# Patient Record
Sex: FEMALE | Race: WHITE | Employment: OTHER | URBAN - NONMETROPOLITAN AREA
[De-identification: names, ages, dates, MRNs, and addresses within clinical notes are randomized per-mention and may not be internally consistent; named-entity substitution may affect disease eponyms.]

---

## 2019-03-26 LAB
BILIRUBIN URINE: ABNORMAL MG/DL
BLOOD, URINE: POSITIVE
CLARITY: ABNORMAL
COLOR: YELLOW
GLUCOSE URINE: NEGATIVE
KETONES, URINE: ABNORMAL
LEUKOCYTE ESTERASE, URINE: ABNORMAL
NITRITE, URINE: NEGATIVE
PH UA: 6 (ref 4.5–8)
PROTEIN UA: ABNORMAL
SPECIFIC GRAVITY UA: 1.02 (ref 1–1.03)
UROBILINOGEN, URINE: NORMAL

## 2019-03-28 LAB
BASOPHILS ABSOLUTE: NORMAL /ΜL
BASOPHILS RELATIVE PERCENT: NORMAL %
BUN BLDV-MCNC: 22 MG/DL
CALCIUM SERPL-MCNC: 9 MG/DL
CHLORIDE BLD-SCNC: 103 MMOL/L
CO2: 29 MMOL/L
CREAT SERPL-MCNC: 0.94 MG/DL
EOSINOPHILS ABSOLUTE: NORMAL /ΜL
EOSINOPHILS RELATIVE PERCENT: NORMAL %
GFR CALCULATED: NORMAL
GLUCOSE BLD-MCNC: 132 MG/DL
HCT VFR BLD CALC: 41.5 % (ref 36–46)
HEMOGLOBIN: 13.4 G/DL (ref 12–16)
LYMPHOCYTES ABSOLUTE: NORMAL /ΜL
LYMPHOCYTES RELATIVE PERCENT: NORMAL %
MCH RBC QN AUTO: 32 PG
MCHC RBC AUTO-ENTMCNC: 32.3 G/DL
MCV RBC AUTO: 99 FL
MONOCYTES ABSOLUTE: NORMAL /ΜL
MONOCYTES RELATIVE PERCENT: NORMAL %
NEUTROPHILS ABSOLUTE: NORMAL /ΜL
NEUTROPHILS RELATIVE PERCENT: NORMAL %
PDW BLD-RTO: 12.7 %
PLATELET # BLD: 140 K/ΜL
PMV BLD AUTO: 9.7 FL
POTASSIUM SERPL-SCNC: 3.7 MMOL/L
RBC # BLD: 4.19 10^6/ΜL
SODIUM BLD-SCNC: 139 MMOL/L
WBC # BLD: 9.5 10^3/ML

## 2019-04-10 ENCOUNTER — OFFICE VISIT (OUTPATIENT)
Dept: UROLOGY | Age: 74
End: 2019-04-10
Payer: MEDICARE

## 2019-04-10 VITALS
HEIGHT: 63 IN | DIASTOLIC BLOOD PRESSURE: 78 MMHG | WEIGHT: 218 LBS | BODY MASS INDEX: 38.62 KG/M2 | SYSTOLIC BLOOD PRESSURE: 120 MMHG

## 2019-04-10 DIAGNOSIS — R31.0 GROSS HEMATURIA: Primary | ICD-10-CM

## 2019-04-10 LAB
BILIRUBIN, POC: NORMAL
BLOOD URINE, POC: NORMAL
CLARITY, POC: CLEAR
COLOR, POC: YELLOW
GLUCOSE URINE, POC: NORMAL
KETONES, POC: NORMAL
LEUKOCYTE EST, POC: NORMAL
NITRITE, POC: NORMAL
PH, POC: 5.5
POST VOID RESIDUAL (PVR): 4 ML
PROTEIN, POC: NORMAL
SPECIFIC GRAVITY, POC: 1.01
UROBILINOGEN, POC: 0.2

## 2019-04-10 PROCEDURE — 1090F PRES/ABSN URINE INCON ASSESS: CPT | Performed by: NURSE PRACTITIONER

## 2019-04-10 PROCEDURE — 1123F ACP DISCUSS/DSCN MKR DOCD: CPT | Performed by: NURSE PRACTITIONER

## 2019-04-10 PROCEDURE — 81003 URINALYSIS AUTO W/O SCOPE: CPT | Performed by: NURSE PRACTITIONER

## 2019-04-10 PROCEDURE — 4040F PNEUMOC VAC/ADMIN/RCVD: CPT | Performed by: NURSE PRACTITIONER

## 2019-04-10 PROCEDURE — 1036F TOBACCO NON-USER: CPT | Performed by: NURSE PRACTITIONER

## 2019-04-10 PROCEDURE — G8417 CALC BMI ABV UP PARAM F/U: HCPCS | Performed by: NURSE PRACTITIONER

## 2019-04-10 PROCEDURE — 99204 OFFICE O/P NEW MOD 45 MIN: CPT | Performed by: NURSE PRACTITIONER

## 2019-04-10 PROCEDURE — G8400 PT W/DXA NO RESULTS DOC: HCPCS | Performed by: NURSE PRACTITIONER

## 2019-04-10 PROCEDURE — G8427 DOCREV CUR MEDS BY ELIG CLIN: HCPCS | Performed by: NURSE PRACTITIONER

## 2019-04-10 PROCEDURE — 3017F COLORECTAL CA SCREEN DOC REV: CPT | Performed by: NURSE PRACTITIONER

## 2019-04-10 PROCEDURE — 51798 US URINE CAPACITY MEASURE: CPT | Performed by: NURSE PRACTITIONER

## 2019-04-10 RX ORDER — CLOPIDOGREL BISULFATE 75 MG/1
75 TABLET ORAL DAILY
COMMUNITY
End: 2020-07-30 | Stop reason: SDUPTHER

## 2019-04-10 RX ORDER — LORAZEPAM 0.5 MG/1
0.5 TABLET ORAL EVERY 6 HOURS PRN
Status: ON HOLD | COMMUNITY
End: 2020-08-13 | Stop reason: ALTCHOICE

## 2019-04-10 RX ORDER — SIMVASTATIN 10 MG
10 TABLET ORAL NIGHTLY
COMMUNITY
End: 2021-09-13 | Stop reason: SDUPTHER

## 2019-04-10 RX ORDER — RANITIDINE 150 MG/1
150 CAPSULE ORAL 2 TIMES DAILY
Status: ON HOLD | COMMUNITY
End: 2020-08-13 | Stop reason: ALTCHOICE

## 2019-04-10 RX ORDER — BUDESONIDE AND FORMOTEROL FUMARATE DIHYDRATE 160; 4.5 UG/1; UG/1
2 AEROSOL RESPIRATORY (INHALATION) 2 TIMES DAILY
Status: ON HOLD | COMMUNITY
End: 2020-08-13 | Stop reason: ALTCHOICE

## 2019-04-10 RX ORDER — FUROSEMIDE 20 MG/1
20 TABLET ORAL 2 TIMES DAILY
Status: ON HOLD | COMMUNITY
End: 2020-08-13 | Stop reason: SDUPTHER

## 2019-04-10 RX ORDER — DILTIAZEM HYDROCHLORIDE 60 MG/1
60 TABLET, FILM COATED ORAL 2 TIMES DAILY
COMMUNITY
End: 2020-11-17 | Stop reason: SDUPTHER

## 2019-04-10 RX ORDER — POTASSIUM CHLORIDE 750 MG/1
10 TABLET, FILM COATED, EXTENDED RELEASE ORAL 2 TIMES DAILY
COMMUNITY
End: 2021-03-04 | Stop reason: SDUPTHER

## 2019-04-10 RX ORDER — ISOSORBIDE MONONITRATE 30 MG/1
30 TABLET, EXTENDED RELEASE ORAL 2 TIMES DAILY
COMMUNITY
End: 2020-11-19

## 2019-04-10 ASSESSMENT — ENCOUNTER SYMPTOMS
ABDOMINAL PAIN: 0
RESPIRATORY NEGATIVE: 1
ALLERGIC/IMMUNOLOGIC NEGATIVE: 1
BACK PAIN: 0
EYES NEGATIVE: 1
GASTROINTESTINAL NEGATIVE: 1

## 2019-04-10 NOTE — PROGRESS NOTES
[address] [facility]  [address] [facility] [place] [address]  Dept: [phone]  Loc: [phone]    Visit Date: 4/10/2019        HPI:     Frances Horton is a 68 y.o. female who presents today for:  Chief Complaint   Patient presents with    Advice Only     New patient referred by Dakotah Oswald MD for recurrent UTI'S and hematuria.  Hematuria       HPI   Pt referred to our office for gross hematuria. She reports she recently presented to the ER secondary to chest pain. While she was in the ER she started having hematuria and was noted to have a UTI. She was admitted and treated with IV antibiotics. She reports last UTI was 8-9 years ago and denies issues with recurrent UTIs. She notes increased fatigue, decreased appetite and intermittent blood in the urine. She has a hx of kidney stones and reports it has been several years since her last episode. She denies dysuria, fever, chills, frequency, urgency, abdominal or flank pain. Current Outpatient Medications   Medication Sig Dispense Refill    clopidogrel (PLAVIX) 75 MG tablet Take 75 mg by mouth daily      diltiazem (CARDIZEM) 60 MG tablet Take 60 mg by mouth 4 times daily      furosemide (LASIX) 20 MG tablet Take 20 mg by mouth 2 times daily      isosorbide mononitrate (IMDUR) 30 MG extended release tablet Take 30 mg by mouth daily      potassium chloride (KLOR-CON 10) 10 MEQ extended release tablet Take 10 mEq by mouth daily      LORazepam (ATIVAN) 0.5 MG tablet Take 0.5 mg by mouth every 6 hours as needed for Anxiety.       metoprolol tartrate (LOPRESSOR) 25 MG tablet Take 25 mg by mouth 2 times daily      ranitidine (ZANTAC) 150 MG capsule Take 150 mg by mouth 2 times daily      simvastatin (ZOCOR) 10 MG tablet Take 10 mg by mouth nightly      budesonide-formoterol (SYMBICORT) 160-4.5 MCG/ACT AERO Inhale 2 puffs into the lungs 2 times daily       No current facility-administered medications for this visit. Past Medical History  Lyndsey Thapa  has no past medical history on file. Past Surgical History  The patient  has a past surgical history that includes Hysterectomy, vaginal; Cholecystectomy; Ovary removal; Kidney stone surgery; and Carotid stent placement. Family History  This patient's family history is not on file. Social History  Keyanna  reports that she has never smoked. She has never used smokeless tobacco.      Subjective:      Review of Systems   Constitutional: Positive for appetite change and fatigue. Negative for activity change, chills, diaphoresis and fever. HENT: Negative. Eyes: Negative. Respiratory: Negative. Cardiovascular: Negative. Gastrointestinal: Negative. Negative for abdominal pain. Endocrine: Negative. Genitourinary: Positive for hematuria. Negative for decreased urine volume, difficulty urinating, dysuria, flank pain, frequency and urgency. Musculoskeletal: Negative. Negative for back pain. Skin: Negative. Allergic/Immunologic: Negative. Neurological: Negative. Hematological: Negative. Psychiatric/Behavioral: Negative. Objective:   /78   Ht 5' 3\" (1.6 m)   Wt 218 lb (98.9 kg)   BMI 38.62 kg/m²     Physical Exam   Constitutional: She is oriented to person, place, and time. She appears well-developed and well-nourished. No distress. HENT:   Head: Normocephalic and atraumatic. Eyes: Right eye exhibits no discharge. Left eye exhibits no discharge. No scleral icterus. Neck: No JVD present. No tracheal deviation present. Cardiovascular: Normal rate, regular rhythm, normal heart sounds and intact distal pulses. No murmur heard. Pulmonary/Chest: Effort normal and breath sounds normal. No respiratory distress. Abdominal: Soft. Bowel sounds are normal. She exhibits no distension. There is no tenderness. Musculoskeletal: Normal range of motion.    Neurological: She is alert and oriented to person, place, and time. No cranial nerve deficit. Skin: Skin is warm and dry. Capillary refill takes less than 2 seconds. She is not diaphoretic. Psychiatric: She has a normal mood and affect. Her behavior is normal.       POC  Results for POC orders placed in visit on 04/10/19   POCT Urinalysis No Micro (Auto)   Result Value Ref Range    Color, UA yellow     Clarity, UA clear     Glucose, UA POC neg     Bilirubin, UA neg     Ketones, UA neg     Spec Grav, UA 1.015     Blood, UA POC neg     pH, UA 5.5     Protein, UA POC neg     Urobilinogen, UA 0.2     Leukocytes, UA moderate     Nitrite, UA neg    poct post void residual   Result Value Ref Range    post void residual 4 ml         Assessment:     Gross hematuria  Hx kidney stones  Recent UTI      Plan:     Pt with recent UTI and intermittent gross hematuria. She notes hx of stones. Recommend checking CT urogram to eval for stones. If CT negative for abnormality we will schedule office cystoscopy. CT urogram--call results.

## 2019-04-15 ENCOUNTER — TELEPHONE (OUTPATIENT)
Dept: UROLOGY | Age: 74
End: 2019-04-15

## 2019-04-16 ENCOUNTER — HOSPITAL ENCOUNTER (OUTPATIENT)
Dept: CT IMAGING | Age: 74
Discharge: HOME | End: 2019-04-16
Payer: MEDICARE

## 2019-04-16 ENCOUNTER — TELEPHONE (OUTPATIENT)
Dept: UROLOGY | Age: 74
End: 2019-04-16

## 2019-04-16 DIAGNOSIS — Z00.6 ENCOUNTER FOR EXAMINATION FOR NORMAL COMPARISON AND CONTROL IN CLINICAL RESEARCH PROGRAM: ICD-10-CM

## 2019-04-16 PROCEDURE — 3209999900 CT COMPARISON OF OUTSIDE FILMS

## 2019-04-16 NOTE — TELEPHONE ENCOUNTER
Received CT from [facility] [place] GUI, Scanned in [place] and routed to [place]

## 2019-04-16 NOTE — TELEPHONE ENCOUNTER
Please have Maite Keep push through [facility] images of patient's CT abd/pelvis performed 4/12/19 so I can review as report notes a distal ureteral calculus.

## 2019-04-17 ENCOUNTER — TELEPHONE (OUTPATIENT)
Dept: UROLOGY | Age: 74
End: 2019-04-17

## 2019-04-17 NOTE — TELEPHONE ENCOUNTER
Pt's CT scan with 6 x 6 mm left distal ureteral stone with hydronephrosis. Called and discussed with patient. Recommend ureteroscopic treatment and pt is agreeable. I described the procedure in detail and also described the associated risks and benefits at length. We discussed possible alternative therapies. We discussed the risks and benefits of not undergoing therapy. Patient understands these risks and benefits and desires to proceed. Post-op expectations were discussed; stent pain, urinary frequency and urgency secondary to the stent, dysuria which should improve 1-2 days after procedure, and intermittent hematuria can be expected as long as stent is in place. Please schedule pt for Cystoscopy, possible left ureteroscopy, possible laser lithotripsy, possible basket retrieval of stone fragments, possible left ureteral stent placement by Dr. Jory Alcaraz with appropriate medical clearance.

## 2019-04-18 ENCOUNTER — TELEPHONE (OUTPATIENT)
Dept: UROLOGY | Age: 74
End: 2019-04-18

## 2019-04-18 DIAGNOSIS — Z01.818 PRE-OP TESTING: ICD-10-CM

## 2019-04-18 DIAGNOSIS — N20.0 KIDNEY STONE: Primary | ICD-10-CM

## 2019-04-26 NOTE — TELEPHONE ENCOUNTER
DO NOT TAKE ASPIRIN, PLAVIX, FISH OIL, GLUCOSAMINE CHONDROITIN, MULTIVITAMINS, VITAMIN A, VITAMIN E, VITAMIN B, COUMADIN, OR MOTRIN-LIKE DRUGS 7 DAYS PRIOR TO SURGERY AND 3 DAYS FOLLOWING     Michel Leger [birth year] Diagnosis: Kidney Stone    Surgical Physician: Dr. Agata Lemus have been scheduled for the procedure marked below:      Surgery: Cystoscopy, left ureteroscopy,posible laser lithotripsy,possible basket retrieval of stone fragments, and possible left ureteral stent. Date: 5/8/19     Anesthesia: Anesthesiologist (General/Spinal)     Place of Service: [facility]           SURGERY ARRIVAL TIME WILL BE DETERMINED BY DR. Breanna Plasencia AT [address] [place] [address]. YOU WILL BE CONTACTED WITH THIS INFORMATION. INSTRUCTIONS AS MARKED BELOW:    1.  DO NOT eat or drink anything after midnight before surgery. 2.  We prefer you shower or bathe with an antibacterial soap (Dial) the morning of surgery. 3.  Please ensure to have a  with you to transport you home. 4.  Please bring a current medication list, photo ID and insurance card(s) with you  5. Okay to take Tylenol  6. If you take Glucophage or Metformin, hold 48-hours prior to surgery  7. Take blood pressure medication as directed, if taken in the morning take with a small sip of water  8. PLEASE BRING THIS LETTER WITH YOU AND SHOW IT TO THE  AT [place] [address]. 9.  may assist with your surgery  10. Does patient have a Pace Maker? No  11. Please send a copy to the Family Dr: Santhosh Saenz MD   12. Do the uranalysis 7-10 days prior to surgery date. Order included.   13. Your follow up appointment is scheduled for   5/16/19   At  10:30 am With  Dr. Viridiana Perez    Date: 4/26/2019
Patient needs scheduled for surgery. Patient will need clearance from Dr. Sylvia Limon. Cardiac clearance form faxed. Surgery consent on arrival. Pre op testing done. Patient will need uranalysis done. Surgery date pending clearance.
SURGERY 6  [address] [place] [address] [address] JORDAN SANCHEZAJAY GARVIN OFFENEGG II.KAVITA, Ondina Gutierrez [address]      Phone *[phone] *[phone]   Surgical Scheduling Direct Line Phone *[phone] Fax *[phone]      [facility] [birth year] female    [address]   Marital Status:        Home Phone: [phone]      Cell Phone:    Telephone Information:   Mobile [phone]          Surgeon: Dr. Antoine Cornejo  Surgery Date: 5/8/19   Time:     Procedure: Cystoscopy,possible left ureteroscopy,possible laser lithotripsy,possible basket retrieval of stone fragments, and possible left ureteral stent. Diagnosis: Kidney Stone     Important Medical History: In [facility]    Special Inst/Equip:     CPT Codes:    56999  Latex Allergy:  No     Cardiac Device:  No     Anesthesia:  Anesthesiologist (General/Spinal)          Admission Type:  Same Day                             Admit Prior to Day of Surgery: No    Case Location:  Main OR           Preadmission Testing:Phone Call              PAT Date and Time:______________________________________________________    PAT Confirmation #: ______________________________________________________    Post Op Visit: ___________________________________________________________    Need Preop Cardiac Clearance: Yes    Does Patient have Cardiologist/physician?      Dr. Shawnee Butler Confirmation #: __________________________________________________    Seretha Rhodes: ________________________   Date: __________________________     [employer] Name: Medicare
surgery ___Risk Unacceptable-Communication to Follow  Comments:_____________________________________________________  ________________________________________________________________________  Physician ___________________________  Date:_______________  Physician Printed Name:  _________________________    Jack Zapata  [phone]

## 2019-05-02 ENCOUNTER — TELEPHONE (OUTPATIENT)
Dept: UROLOGY | Age: 74
End: 2019-05-02

## 2019-05-02 NOTE — TELEPHONE ENCOUNTER
Patient notified of surgery arrival time. Patient is to be at [address] [place] [address] Same day surgery by  2:00pm   on  05-. Patient reminded to have nothing to eat or drink after midnight. Patient voiced understanding.

## 2019-05-05 NOTE — H&P
History and Physical performed by Hiwot Corado CNP    Diego Campos is a 68 y.o. female who presents today for:       Chief Complaint   Patient presents with    Advice Only       New patient referred by Nolberto Alvarado MD for recurrent UTI'S and hematuria.  Hematuria         HPI   Pt referred to our office for gross hematuria. She reports she recently presented to the ER secondary to chest pain. While she was in the ER she started having hematuria and was noted to have a UTI. She was admitted and treated with IV antibiotics. She reports last UTI was 8-9 years ago and denies issues with recurrent UTIs. She notes increased fatigue, decreased appetite and intermittent blood in the urine. She has a hx of kidney stones and reports it has been several years since her last episode. She denies dysuria, fever, chills, frequency, urgency, abdominal or flank pain.       Current Facility-Administered Medications          Current Outpatient Medications   Medication Sig Dispense Refill    clopidogrel (PLAVIX) 75 MG tablet Take 75 mg by mouth daily        diltiazem (CARDIZEM) 60 MG tablet Take 60 mg by mouth 4 times daily        furosemide (LASIX) 20 MG tablet Take 20 mg by mouth 2 times daily        isosorbide mononitrate (IMDUR) 30 MG extended release tablet Take 30 mg by mouth daily        potassium chloride (KLOR-CON 10) 10 MEQ extended release tablet Take 10 mEq by mouth daily        LORazepam (ATIVAN) 0.5 MG tablet Take 0.5 mg by mouth every 6 hours as needed for Anxiety.        metoprolol tartrate (LOPRESSOR) 25 MG tablet Take 25 mg by mouth 2 times daily        ranitidine (ZANTAC) 150 MG capsule Take 150 mg by mouth 2 times daily        simvastatin (ZOCOR) 10 MG tablet Take 10 mg by mouth nightly        budesonide-formoterol (SYMBICORT) 160-4.5 MCG/ACT AERO Inhale 2 puffs into the lungs 2 times daily          No current facility-administered medications for this visit.       Past Medical History  Christian Nagy  has no past medical history on file.     Past Surgical History  The patient  has a past surgical history that includes Hysterectomy, vaginal; Cholecystectomy; Ovary removal; Kidney stone surgery; and Carotid stent placement.     Family History  This patient's family history is not on file.     Social History  Keyanna  reports that she has never smoked. She has never used smokeless tobacco.        Subjective:      Review of Systems   Constitutional: Positive for appetite change and fatigue. Negative for activity change, chills, diaphoresis and fever. HENT: Negative. Eyes: Negative. Respiratory: Negative. Cardiovascular: Negative. Gastrointestinal: Negative. Negative for abdominal pain. Endocrine: Negative. Genitourinary: Positive for hematuria. Negative for decreased urine volume, difficulty urinating, dysuria, flank pain, frequency and urgency. Musculoskeletal: Negative. Negative for back pain. Skin: Negative. Allergic/Immunologic: Negative. Neurological: Negative. Hematological: Negative. Psychiatric/Behavioral: Negative.          Objective:   /78   Ht 5' 3\" (1.6 m)   Wt 218 lb (98.9 kg)   BMI 38.62 kg/m²      Physical Exam   Constitutional: She is oriented to person, place, and time. She appears well-developed and well-nourished. No distress. HENT:   Head: Normocephalic and atraumatic. Eyes: Right eye exhibits no discharge. Left eye exhibits no discharge. No scleral icterus. Neck: No JVD present. No tracheal deviation present. Cardiovascular: Normal rate, regular rhythm, normal heart sounds and intact distal pulses. No murmur heard. Pulmonary/Chest: Effort normal and breath sounds normal. No respiratory distress. Abdominal: Soft. Bowel sounds are normal. She exhibits no distension. There is no tenderness. Musculoskeletal: Normal range of motion. Neurological: She is alert and oriented to person, place, and time.  No

## 2019-05-08 ENCOUNTER — ANESTHESIA EVENT (OUTPATIENT)
Dept: OPERATING ROOM | Age: 74
End: 2019-05-08
Payer: MEDICARE

## 2019-05-08 ENCOUNTER — HOSPITAL ENCOUNTER (OUTPATIENT)
Age: 74
Setting detail: OUTPATIENT SURGERY
Discharge: HOME | End: 2019-05-08
Attending: UROLOGY | Admitting: UROLOGY
Payer: MEDICARE

## 2019-05-08 ENCOUNTER — ANESTHESIA (OUTPATIENT)
Dept: OPERATING ROOM | Age: 74
End: 2019-05-08
Payer: MEDICARE

## 2019-05-08 VITALS
SYSTOLIC BLOOD PRESSURE: 133 MMHG | TEMPERATURE: 97.7 F | OXYGEN SATURATION: 99 % | RESPIRATION RATE: 6 BRPM | DIASTOLIC BLOOD PRESSURE: 65 MMHG

## 2019-05-08 VITALS
HEART RATE: 83 BPM | DIASTOLIC BLOOD PRESSURE: 66 MMHG | BODY MASS INDEX: 39.23 KG/M2 | WEIGHT: 221.4 LBS | TEMPERATURE: 98.2 F | SYSTOLIC BLOOD PRESSURE: 134 MMHG | HEIGHT: 63 IN | RESPIRATION RATE: 18 BRPM | OXYGEN SATURATION: 92 %

## 2019-05-08 PROCEDURE — 82365 CALCULUS SPECTROSCOPY: CPT

## 2019-05-08 PROCEDURE — 3600000003 HC SURGERY LEVEL 3 BASE: Performed by: UROLOGY

## 2019-05-08 PROCEDURE — 2500000003 HC RX 250 WO HCPCS: Performed by: NURSE ANESTHETIST, CERTIFIED REGISTERED

## 2019-05-08 PROCEDURE — 3600000013 HC SURGERY LEVEL 3 ADDTL 15MIN: Performed by: UROLOGY

## 2019-05-08 PROCEDURE — 7100000010 HC PHASE II RECOVERY - FIRST 15 MIN: Performed by: UROLOGY

## 2019-05-08 PROCEDURE — 6370000000 HC RX 637 (ALT 250 FOR IP): Performed by: NURSE ANESTHETIST, CERTIFIED REGISTERED

## 2019-05-08 PROCEDURE — 2580000003 HC RX 258: Performed by: UROLOGY

## 2019-05-08 PROCEDURE — 52356 CYSTO/URETERO W/LITHOTRIPSY: CPT | Performed by: UROLOGY

## 2019-05-08 PROCEDURE — 2709999900 HC NON-CHARGEABLE SUPPLY: Performed by: UROLOGY

## 2019-05-08 PROCEDURE — 6360000002 HC RX W HCPCS: Performed by: UROLOGY

## 2019-05-08 PROCEDURE — 7100000000 HC PACU RECOVERY - FIRST 15 MIN: Performed by: UROLOGY

## 2019-05-08 PROCEDURE — 7100000011 HC PHASE II RECOVERY - ADDTL 15 MIN: Performed by: UROLOGY

## 2019-05-08 PROCEDURE — 7100000001 HC PACU RECOVERY - ADDTL 15 MIN: Performed by: UROLOGY

## 2019-05-08 PROCEDURE — C1769 GUIDE WIRE: HCPCS | Performed by: UROLOGY

## 2019-05-08 PROCEDURE — C1894 INTRO/SHEATH, NON-LASER: HCPCS | Performed by: UROLOGY

## 2019-05-08 PROCEDURE — C2617 STENT, NON-COR, TEM W/O DEL: HCPCS | Performed by: UROLOGY

## 2019-05-08 PROCEDURE — 52352 CYSTOURETERO W/STONE REMOVE: CPT | Performed by: UROLOGY

## 2019-05-08 PROCEDURE — 3700000000 HC ANESTHESIA ATTENDED CARE: Performed by: UROLOGY

## 2019-05-08 PROCEDURE — 6360000002 HC RX W HCPCS: Performed by: NURSE ANESTHETIST, CERTIFIED REGISTERED

## 2019-05-08 PROCEDURE — 3700000001 HC ADD 15 MINUTES (ANESTHESIA): Performed by: UROLOGY

## 2019-05-08 PROCEDURE — 2720000010 HC SURG SUPPLY STERILE: Performed by: UROLOGY

## 2019-05-08 DEVICE — VARIABLE LENGTH URETERAL STENT
Type: IMPLANTABLE DEVICE | Site: URETER | Status: FUNCTIONAL
Brand: CONTOUR VL™

## 2019-05-08 RX ORDER — FENTANYL CITRATE 50 UG/ML
25 INJECTION, SOLUTION INTRAMUSCULAR; INTRAVENOUS
Status: DISCONTINUED | OUTPATIENT
Start: 2019-05-08 | End: 2019-05-08 | Stop reason: HOSPADM

## 2019-05-08 RX ORDER — LABETALOL HYDROCHLORIDE 5 MG/ML
5 INJECTION, SOLUTION INTRAVENOUS EVERY 10 MIN PRN
Status: DISCONTINUED | OUTPATIENT
Start: 2019-05-08 | End: 2019-05-08 | Stop reason: HOSPADM

## 2019-05-08 RX ORDER — FENTANYL CITRATE 50 UG/ML
50 INJECTION, SOLUTION INTRAMUSCULAR; INTRAVENOUS EVERY 5 MIN PRN
Status: DISCONTINUED | OUTPATIENT
Start: 2019-05-08 | End: 2019-05-08 | Stop reason: HOSPADM

## 2019-05-08 RX ORDER — ONDANSETRON 2 MG/ML
4 INJECTION INTRAMUSCULAR; INTRAVENOUS
Status: DISCONTINUED | OUTPATIENT
Start: 2019-05-08 | End: 2019-05-08 | Stop reason: HOSPADM

## 2019-05-08 RX ORDER — KETOROLAC TROMETHAMINE 10 MG/1
10 TABLET, FILM COATED ORAL EVERY 6 HOURS PRN
Qty: 20 TABLET | Refills: 0 | Status: ON HOLD | OUTPATIENT
Start: 2019-05-08 | End: 2020-08-13 | Stop reason: ALTCHOICE

## 2019-05-08 RX ORDER — PROPOFOL 10 MG/ML
INJECTION, EMULSION INTRAVENOUS PRN
Status: DISCONTINUED | OUTPATIENT
Start: 2019-05-08 | End: 2019-05-08 | Stop reason: SDUPTHER

## 2019-05-08 RX ORDER — SUCCINYLCHOLINE/SOD CL,ISO/PF 200MG/10ML
SYRINGE (ML) INTRAVENOUS PRN
Status: DISCONTINUED | OUTPATIENT
Start: 2019-05-08 | End: 2019-05-08 | Stop reason: SDUPTHER

## 2019-05-08 RX ORDER — MEPERIDINE HYDROCHLORIDE 25 MG/ML
12.5 INJECTION INTRAMUSCULAR; INTRAVENOUS; SUBCUTANEOUS EVERY 5 MIN PRN
Status: DISCONTINUED | OUTPATIENT
Start: 2019-05-08 | End: 2019-05-08 | Stop reason: HOSPADM

## 2019-05-08 RX ORDER — ACETAMINOPHEN 325 MG/1
650 TABLET ORAL EVERY 4 HOURS PRN
Status: DISCONTINUED | OUTPATIENT
Start: 2019-05-08 | End: 2019-05-08 | Stop reason: HOSPADM

## 2019-05-08 RX ORDER — DEXAMETHASONE SODIUM PHOSPHATE 4 MG/ML
INJECTION, SOLUTION INTRA-ARTICULAR; INTRALESIONAL; INTRAMUSCULAR; INTRAVENOUS; SOFT TISSUE PRN
Status: DISCONTINUED | OUTPATIENT
Start: 2019-05-08 | End: 2019-05-08 | Stop reason: SDUPTHER

## 2019-05-08 RX ORDER — FENTANYL CITRATE 50 UG/ML
INJECTION, SOLUTION INTRAMUSCULAR; INTRAVENOUS PRN
Status: DISCONTINUED | OUTPATIENT
Start: 2019-05-08 | End: 2019-05-08 | Stop reason: SDUPTHER

## 2019-05-08 RX ORDER — OXYBUTYNIN CHLORIDE 5 MG/1
5 TABLET ORAL 3 TIMES DAILY PRN
Qty: 30 TABLET | Refills: 0 | Status: ON HOLD | OUTPATIENT
Start: 2019-05-08 | End: 2020-08-13 | Stop reason: ALTCHOICE

## 2019-05-08 RX ORDER — KETOROLAC TROMETHAMINE 30 MG/ML
15 INJECTION, SOLUTION INTRAMUSCULAR; INTRAVENOUS EVERY 6 HOURS PRN
Status: DISCONTINUED | OUTPATIENT
Start: 2019-05-08 | End: 2019-05-08 | Stop reason: HOSPADM

## 2019-05-08 RX ORDER — MINERAL OIL AND WHITE PETROLATUM 150; 830 MG/G; MG/G
OINTMENT OPHTHALMIC PRN
Status: DISCONTINUED | OUTPATIENT
Start: 2019-05-08 | End: 2019-05-08 | Stop reason: SDUPTHER

## 2019-05-08 RX ORDER — SODIUM CHLORIDE 9 MG/ML
INJECTION, SOLUTION INTRAVENOUS CONTINUOUS
Status: DISCONTINUED | OUTPATIENT
Start: 2019-05-08 | End: 2019-05-08 | Stop reason: HOSPADM

## 2019-05-08 RX ORDER — CIPROFLOXACIN 250 MG/1
250 TABLET, FILM COATED ORAL 2 TIMES DAILY
Qty: 6 TABLET | Refills: 0 | Status: SHIPPED | OUTPATIENT
Start: 2019-05-08 | End: 2019-05-11

## 2019-05-08 RX ADMIN — CEFTRIAXONE SODIUM 2 G: 2 INJECTION, POWDER, FOR SOLUTION INTRAMUSCULAR; INTRAVENOUS at 16:43

## 2019-05-08 RX ADMIN — Medication 120 MG: at 16:38

## 2019-05-08 RX ADMIN — FENTANYL CITRATE 50 MCG: 50 INJECTION INTRAMUSCULAR; INTRAVENOUS at 16:38

## 2019-05-08 RX ADMIN — PROPOFOL 150 MG: 10 INJECTION, EMULSION INTRAVENOUS at 16:38

## 2019-05-08 RX ADMIN — MINERAL OIL AND WHITE PETROLATUM 1 APPLICATOR: 150; 830 OINTMENT OPHTHALMIC at 16:40

## 2019-05-08 RX ADMIN — DEXAMETHASONE SODIUM PHOSPHATE 8 MG: 4 INJECTION, SOLUTION INTRAMUSCULAR; INTRAVENOUS at 16:43

## 2019-05-08 RX ADMIN — SODIUM CHLORIDE: 9 INJECTION, SOLUTION INTRAVENOUS at 15:11

## 2019-05-08 ASSESSMENT — PULMONARY FUNCTION TESTS
PIF_VALUE: 24
PIF_VALUE: 24
PIF_VALUE: 27
PIF_VALUE: 24
PIF_VALUE: 2
PIF_VALUE: 24
PIF_VALUE: 24
PIF_VALUE: 27
PIF_VALUE: 23
PIF_VALUE: 27
PIF_VALUE: 24
PIF_VALUE: 24
PIF_VALUE: 30
PIF_VALUE: 25
PIF_VALUE: 2
PIF_VALUE: 2
PIF_VALUE: 20
PIF_VALUE: 24
PIF_VALUE: 25
PIF_VALUE: 24
PIF_VALUE: 24
PIF_VALUE: 25
PIF_VALUE: 24
PIF_VALUE: 3
PIF_VALUE: 27
PIF_VALUE: 24
PIF_VALUE: 3
PIF_VALUE: 24
PIF_VALUE: 2
PIF_VALUE: 21
PIF_VALUE: 26
PIF_VALUE: 26
PIF_VALUE: 24
PIF_VALUE: 1
PIF_VALUE: 26
PIF_VALUE: 24
PIF_VALUE: 25
PIF_VALUE: 24
PIF_VALUE: 24
PIF_VALUE: 3
PIF_VALUE: 23
PIF_VALUE: 24
PIF_VALUE: 24
PIF_VALUE: 30
PIF_VALUE: 24

## 2019-05-08 ASSESSMENT — PAIN SCALES - GENERAL
PAINLEVEL_OUTOF10: 4
PAINLEVEL_OUTOF10: 4
PAINLEVEL_OUTOF10: 0
PAINLEVEL_OUTOF10: 4

## 2019-05-08 ASSESSMENT — PAIN - FUNCTIONAL ASSESSMENT: PAIN_FUNCTIONAL_ASSESSMENT: 0-10

## 2019-05-08 NOTE — PROGRESS NOTES
Admit to sds. Fall and allergy band applied to the patient. Daughter, Kade Pearsons with the patient.

## 2019-05-08 NOTE — ANESTHESIA PRE PROCEDURE
Department of Anesthesiology  Preprocedure Note       Name:  Jorge L Greene   Age:  68 y.o.  :  1945                                          MRN:  [id number]         Date:  2019      Surgeon: Wanad Alcala):  Giorgi Stephenson MD    Procedure: Alean Emory, POSS LEFT URETEROSCOPY, POSS LASER LITOHTRIPSY, POSS BASKET RETRIEVAL OF STONE FRAGMENTS, POSS LEFT URETERAL STENT (N/A Ureter)    Medications prior to admission:   Prior to Admission medications    Medication Sig Start Date End Date Taking? Authorizing Provider   diltiazem (CARDIZEM) 60 MG tablet Take 60 mg by mouth 4 times daily   Yes Historical Provider, MD   furosemide (LASIX) 20 MG tablet Take 20 mg by mouth 2 times daily   Yes Historical Provider, MD   isosorbide mononitrate (IMDUR) 30 MG extended release tablet Take 30 mg by mouth daily   Yes Historical Provider, MD   potassium chloride (KLOR-CON 10) 10 MEQ extended release tablet Take 10 mEq by mouth daily   Yes Historical Provider, MD   metoprolol tartrate (LOPRESSOR) 25 MG tablet Take 25 mg by mouth 2 times daily   Yes Historical Provider, MD   simvastatin (ZOCOR) 10 MG tablet Take 10 mg by mouth nightly   Yes Historical Provider, MD   clopidogrel (PLAVIX) 75 MG tablet Take 75 mg by mouth daily    Historical Provider, MD   LORazepam (ATIVAN) 0.5 MG tablet Take 0.5 mg by mouth every 6 hours as needed for Anxiety.     Historical Provider, MD   ranitidine (ZANTAC) 150 MG capsule Take 150 mg by mouth 2 times daily    Historical Provider, MD   budesonide-formoterol (SYMBICORT) 160-4.5 MCG/ACT AERO Inhale 2 puffs into the lungs 2 times daily    Historical Provider, MD       Current medications:    Current Facility-Administered Medications   Medication Dose Route Frequency Provider Last Rate Last Dose    0.9 % sodium chloride infusion   Intravenous Continuous Giorgi Stephenson  mL/hr at 19 1511      cefTRIAXone (ROCEPHIN) 2 g IVPB in D5W 50ml minibag  2 g Intravenous 30 Min Pre-Op Aidan ALFARO Latricia Perez MD           Allergies: Allergies   Allergen Reactions    Codeine Other (See Comments)     Breathing difficulties       Problem List:  There is no problem list on file for this patient. Past Medical History:  History reviewed. No pertinent past medical history. Past Surgical History:        Procedure Laterality Date    CAROTID STENT PLACEMENT      CHOLECYSTECTOMY      HYSTERECTOMY, VAGINAL      KIDNEY STONE SURGERY      OVARY REMOVAL         Social History:    Social History     Tobacco Use    Smoking status: Never Smoker    Smokeless tobacco: Never Used   Substance Use Topics    Alcohol use: Not Currently                                Counseling given: Not Answered      Vital Signs (Current):   Vitals:    05/08/19 1455   BP: (!) 141/77   Pulse: 66   Resp: 18   Temp: 98.3 °F (36.8 °C)   TempSrc: Temporal   SpO2: 95%   Weight: 221 lb 6.4 oz (100.4 kg)   Height: 5' 3\" (1.6 m)                                              BP Readings from Last 3 Encounters:   05/08/19 (!) 141/77   04/10/19 120/78       NPO Status: Time of last liquid consumption: 2200                        Time of last solid consumption: 2200                        Date of last liquid consumption: 05/07/19                        Date of last solid food consumption: 05/07/19    BMI:   Wt Readings from Last 3 Encounters:   05/08/19 221 lb 6.4 oz (100.4 kg)   04/10/19 218 lb (98.9 kg)     Body mass index is 39.22 kg/m².     CBC:   Lab Results   Component Value Date    WBC 9.5 03/28/2019    RBC 4.19 03/28/2019    HGB 13.4 03/28/2019    HCT 41.5 03/28/2019    MCV 99.0 03/28/2019    RDW 12.7 03/28/2019     03/28/2019       CMP:   Lab Results   Component Value Date     03/28/2019    K 3.7 03/28/2019     03/28/2019    CO2 29 03/28/2019    BUN 22 03/28/2019    CREATININE 0.94 03/28/2019    LABGLOM  03/28/2019      Comment:      eGFR 58    GLUCOSE 132 03/28/2019    CALCIUM 9.0 03/28/2019       POC Tests: No

## 2019-05-08 NOTE — PROGRESS NOTES
Pt has met discharge criteria and states she is ready for discharge to home. IV removed, gauze and tape applied. Dressed in own clothes and personal belongings gathered. Discharge instructions given to pt and family; pt and family verbalized understanding of discharge instructions, prescriptions and follow up appointments. Pt transported to discharge lobby by [place] staff.

## 2019-05-09 ENCOUNTER — TELEPHONE (OUTPATIENT)
Dept: UROLOGY | Age: 74
End: 2019-05-09

## 2019-05-09 NOTE — TELEPHONE ENCOUNTER
The antibiotic is used for prophylaxis. Her prior UA was negative for evidence of infection. No need for mor than 3 days of antibiotic.

## 2019-05-09 NOTE — TELEPHONE ENCOUNTER
The patient was advised of the message from [facility] CNP. The antibiotic is used for prophylaxis. Her prior UA was negative for evidence of infection. No need for mor than 3 days of antibiotic. She voiced understanding.

## 2019-05-10 NOTE — BRIEF OP NOTE
Brief Postoperative Note  ______________________________________________________________    Patient: Flores Yumi  YOB: 1945  MRN: [id number]  Date of Procedure: 5/8/2019    Pre-Op Diagnosis: LARGE DISTAL LEFT URETERAL AND LEFT KIDNEY STONES    Post-Op Diagnosis: Same       Procedure(s):  CYSTO, LEFT URETEROSCOPY, LASER LITHOTRIPSY, BASKET RETRIEVAL OF LEFT URETERAL STONE FRAGMENTS -- ADDED PROCEDURAL SERVICES, LEFT URETERORENOSCOPY, BASKET RETRIEVAL OF RENAL STONE AND LEFT URETERAL STENT    Anesthesia: General    Surgeon(s):  Amy Barakat MD    Assistant: none    Estimated Blood Loss (mL): 5 cc    Complications: none    Specimens:   ID Type Source Tests Collected by Time Destination   A : left ureteral stone fragments Stone (Calculus) Ureter SURGICAL PATHOLOGY, STONE ANALYSIS Amy Barakat MD 5/8/2019 1701        Implants:  Implant Name Type Inv.  Item Serial No.  Lot No. LRB No. Used   STENT URET DBL PIGTL MULTI 6FR [id number] Stent:Urological Donnice Brooking PIGTL MULTI 6FR [id number]  [employer]: [place] [id number] Left 1         Drains: * No LDAs found *    Findings: none    Amy Barakat MD

## 2019-05-12 LAB — STONE ANALYSIS: NORMAL

## 2019-05-16 ENCOUNTER — TELEPHONE (OUTPATIENT)
Dept: UROLOGY | Age: 74
End: 2019-05-16

## 2019-05-16 ENCOUNTER — PROCEDURE VISIT (OUTPATIENT)
Dept: UROLOGY | Age: 74
End: 2019-05-16
Payer: MEDICARE

## 2019-05-16 VITALS
WEIGHT: 216 LBS | HEIGHT: 63 IN | SYSTOLIC BLOOD PRESSURE: 118 MMHG | BODY MASS INDEX: 38.27 KG/M2 | DIASTOLIC BLOOD PRESSURE: 68 MMHG

## 2019-05-16 DIAGNOSIS — N20.0 KIDNEY STONE: Primary | ICD-10-CM

## 2019-05-16 LAB
BILIRUBIN URINE: NEGATIVE
BLOOD URINE, POC: ABNORMAL
CHARACTER, URINE: CLEAR
COLOR, URINE: YELLOW
GLUCOSE URINE: NEGATIVE MG/DL
KETONES, URINE: NEGATIVE
LEUKOCYTE CLUMPS, URINE: ABNORMAL
NITRITE, URINE: NEGATIVE
PH, URINE: 6 (ref 5–9)
PROTEIN, URINE: 100 MG/DL
SPECIFIC GRAVITY, URINE: 1.02 (ref 1–1.03)
UROBILINOGEN, URINE: 0.2 EU/DL (ref 0–1)

## 2019-05-16 PROCEDURE — 52310 CYSTOSCOPY AND TREATMENT: CPT | Performed by: UROLOGY

## 2019-05-16 PROCEDURE — 81003 URINALYSIS AUTO W/O SCOPE: CPT | Performed by: UROLOGY

## 2019-05-16 NOTE — TELEPHONE ENCOUNTER
Ugo Gray is scheduled for a 6 month renal ultrasound at [facility] 11/7/19. Ugo Isaac was advised to arrive well hydrated. Avoid carbonated beverages.     Ugo Gray was advised to bring c-d disc to F/U appointment with Daina Jimenez at  [facility] office on 11/13/19 9:45am.

## 2019-05-16 NOTE — PROGRESS NOTES
Cystoscopy with Stent Removal Note:   The patient was prepared and draped in the usual fashion. The 16 Mosotho flexible cystoscope was introduced under vision into the bladder. The indwelling stent was grasped and withdrawn intact. The patient tolerated the procedure well, there were no complications. 500 mg of Ciprofloxacin PO was given at the time of the procedure. Assessment:      Jak Villafuerte was seen in follow up for stent removal after recent cystoscopy with left ureteroscopy, laser lithotripsy, basket retrieval of ureteral stone fragments -- [facility], left ureterorenoscopy, basket retrieval of left renal stone and placement of left ureteral stent      This was done without difficulty. Plan: We will plan to get a 24 hour urine collection in about 4 weeks. Jak Villafuerte will then be called with the results and recommendations for changes that can be made to limit further stone formation. Return in six months with renal ultrasound. Schedule with ne.

## 2019-07-03 ENCOUNTER — TELEPHONE (OUTPATIENT)
Dept: UROLOGY | Age: 74
End: 2019-07-03

## 2019-07-03 DIAGNOSIS — N20.0 KIDNEY STONES: Primary | ICD-10-CM

## 2020-06-05 ENCOUNTER — OFFICE VISIT (OUTPATIENT)
Dept: CARDIOLOGY CLINIC | Age: 75
End: 2020-06-05
Payer: MEDICARE

## 2020-06-05 VITALS
BODY MASS INDEX: 35.26 KG/M2 | DIASTOLIC BLOOD PRESSURE: 70 MMHG | WEIGHT: 199 LBS | SYSTOLIC BLOOD PRESSURE: 126 MMHG | HEART RATE: 78 BPM | HEIGHT: 63 IN

## 2020-06-05 PROCEDURE — 1123F ACP DISCUSS/DSCN MKR DOCD: CPT | Performed by: INTERNAL MEDICINE

## 2020-06-05 PROCEDURE — G8417 CALC BMI ABV UP PARAM F/U: HCPCS | Performed by: INTERNAL MEDICINE

## 2020-06-05 PROCEDURE — G8400 PT W/DXA NO RESULTS DOC: HCPCS | Performed by: INTERNAL MEDICINE

## 2020-06-05 PROCEDURE — 1090F PRES/ABSN URINE INCON ASSESS: CPT | Performed by: INTERNAL MEDICINE

## 2020-06-05 PROCEDURE — 93000 ELECTROCARDIOGRAM COMPLETE: CPT | Performed by: INTERNAL MEDICINE

## 2020-06-05 PROCEDURE — 3017F COLORECTAL CA SCREEN DOC REV: CPT | Performed by: INTERNAL MEDICINE

## 2020-06-05 PROCEDURE — G8427 DOCREV CUR MEDS BY ELIG CLIN: HCPCS | Performed by: INTERNAL MEDICINE

## 2020-06-05 PROCEDURE — 1036F TOBACCO NON-USER: CPT | Performed by: INTERNAL MEDICINE

## 2020-06-05 PROCEDURE — 4040F PNEUMOC VAC/ADMIN/RCVD: CPT | Performed by: INTERNAL MEDICINE

## 2020-06-05 PROCEDURE — 99204 OFFICE O/P NEW MOD 45 MIN: CPT | Performed by: INTERNAL MEDICINE

## 2020-06-05 NOTE — PROGRESS NOTES
[address] [place] [address],[place] [address] [address] [facility] [address]  [place] [address]  Dept: [phone]  Dept Fax: [phone]  Loc: [phone]    Visit Date: 6/5/2020    Ms. Grisel Mcnair is a 69 y.o. female  who presented for:  Chief Complaint   Patient presents with    New Patient       HPI:   HPI   70 yo F hx of renal stone removed by surgery who presents to establish care. 2017 cath done showing no issues, hx of PCI. She is always sob and fatigued. She notes HORTON with ADLs. Sometimes gets chest pressure. She is transferring Cardiologists. 5-6/10 discomfort. She limits herself because she has the symptoms. She is on DAPT, no bleeding. Stent done but she does not recall when. Meds without side effects. No a/t/d. . Current Outpatient Medications:     oxybutynin (DITROPAN) 5 MG tablet, Take 1 tablet by mouth 3 times daily as needed (stent pain, bladder spasms), Disp: 30 tablet, Rfl: 0    clopidogrel (PLAVIX) 75 MG tablet, Take 75 mg by mouth daily, Disp: , Rfl:     diltiazem (CARDIZEM) 60 MG tablet, Take 60 mg by mouth 2 times daily , Disp: , Rfl:     furosemide (LASIX) 20 MG tablet, Take 20 mg by mouth 2 times daily, Disp: , Rfl:     isosorbide mononitrate (IMDUR) 30 MG extended release tablet, Take 30 mg by mouth 2 times daily , Disp: , Rfl:     potassium chloride (KLOR-CON 10) 10 MEQ extended release tablet, Take 10 mEq by mouth 2 times daily , Disp: , Rfl:     LORazepam (ATIVAN) 0.5 MG tablet, Take 0.5 mg by mouth every 6 hours as needed for Anxiety. , Disp: , Rfl:     metoprolol tartrate (LOPRESSOR) 25 MG tablet, Take 25 mg by mouth 2 times daily, Disp: , Rfl:     ranitidine (ZANTAC) 150 MG capsule, Take 150 mg by mouth 2 times daily, Disp: , Rfl:     simvastatin (ZOCOR) 10 MG tablet, Take 10 mg by mouth nightly, Disp: , Rfl:     budesonide-formoterol (SYMBICORT) 160-4.5 MCG/ACT AERO, Inhale 2 puffs into the lungs 2 times daily, Disp: , Rfl:   ketorolac (TORADOL) 10 MG tablet, Take 1 tablet by mouth every 6 hours as needed for Pain, Disp: 20 tablet, Rfl: 0    Past Medical History  Von Murry  has no past medical history on file. Social History  Keyanna  reports that she has never smoked. She has never used smokeless tobacco. She reports previous alcohol use. She reports that she does not use drugs. Family History  Keyanna family history is not on file. There is no family history of bicuspid aortic valve, aneurysms, heart transplant, pacemakers, defibrillators, or sudden cardiac death. Past Surgical History   Past Surgical History:   Procedure Laterality Date    CAROTID STENT PLACEMENT      CHOLECYSTECTOMY      CYSTO/URETERO/PYELOSCOPY, CALCULUS TX N/A 5/8/2019    CYSTO, LEFT URETEROSCOPY, URETERORENOSCOPY, LASER LITOHTRIPSY, BASKET RETRIEVAL OF STONE FRAGMENTS, LEFT URETERAL STENT performed by Eula Goldman MD at [address] [facility], [facility]      OVARY REMOVAL         Review of Systems   Constitutional: Negative for chills and fever  HENT: Negative for congestion, sinus pressure, sneezing and sore throat. Eyes: Negative for pain, discharge, redness and itching. Respiratory: Negative for apnea, cough  Gastrointestinal: Negative for blood in stool, constipation, diarrhea   Endocrine: Negative for cold intolerance, heat intolerance, polydipsia. Genitourinary: Negative for dysuria, enuresis, flank pain and hematuria. Musculoskeletal: Negative for arthralgias, joint swelling and neck pain. Neurological: Negative for numbness and headaches. Psychiatric/Behavioral: Negative for agitation, confusion, decreased concentration and dysphoric mood.      Objective:     /70   Pulse 78   Ht 5' 3\" (1.6 m)   Wt 199 lb (90.3 kg)   BMI 35.25 kg/m²     Wt Readings from Last 3 Encounters:   06/05/20 199 lb (90.3 kg)   05/16/19 216 lb (98 kg)   05/08/19 221 lb 6.4 oz (100.4 kg)     BP Readings from Last 3

## 2020-06-22 ENCOUNTER — HOSPITAL ENCOUNTER (OUTPATIENT)
Dept: NON INVASIVE DIAGNOSTICS | Age: 75
Discharge: HOME | End: 2020-06-22
Payer: MEDICARE

## 2020-06-22 VITALS — WEIGHT: 185 LBS | BODY MASS INDEX: 32.78 KG/M2 | HEIGHT: 63 IN

## 2020-06-22 LAB
LV EF: 53 %
LVEF MODALITY: NORMAL

## 2020-06-22 PROCEDURE — 3430000000 HC RX DIAGNOSTIC RADIOPHARMACEUTICAL: Performed by: INTERNAL MEDICINE

## 2020-06-22 PROCEDURE — 78452 HT MUSCLE IMAGE SPECT MULT: CPT

## 2020-06-22 PROCEDURE — 93017 CV STRESS TEST TRACING ONLY: CPT | Performed by: INTERNAL MEDICINE

## 2020-06-22 PROCEDURE — A9500 TC99M SESTAMIBI: HCPCS | Performed by: INTERNAL MEDICINE

## 2020-06-22 PROCEDURE — 93306 TTE W/DOPPLER COMPLETE: CPT

## 2020-06-22 PROCEDURE — 6360000002 HC RX W HCPCS

## 2020-06-22 RX ADMIN — Medication 35 MILLICURIE: at 14:45

## 2020-06-22 RX ADMIN — Medication 9.9 MILLICURIE: at 13:45

## 2020-06-23 PROCEDURE — 78452 HT MUSCLE IMAGE SPECT MULT: CPT | Performed by: INTERNAL MEDICINE

## 2020-06-23 PROCEDURE — 93016 CV STRESS TEST SUPVJ ONLY: CPT | Performed by: INTERNAL MEDICINE

## 2020-06-23 PROCEDURE — 93018 CV STRESS TEST I&R ONLY: CPT | Performed by: INTERNAL MEDICINE

## 2020-06-26 ENCOUNTER — TELEPHONE (OUTPATIENT)
Dept: CARDIOLOGY CLINIC | Age: 75
End: 2020-06-26

## 2020-06-29 ENCOUNTER — TELEPHONE (OUTPATIENT)
Dept: CARDIOLOGY CLINIC | Age: 75
End: 2020-06-29

## 2020-07-07 NOTE — TELEPHONE ENCOUNTER
Judy Hdz called requesting a refill on the following medications:  Requested Prescriptions     Pending Prescriptions Disp Refills    metoprolol tartrate (LOPRESSOR) 25 MG tablet       Sig: Take 1 tablet by mouth 2 times daily     Pharmacy verified:  .[facility]  [email or web address]    90 day supply    Date of last visit: 06/05/20  Date of next visit (if applicable): 3/71/7281

## 2020-07-30 ENCOUNTER — OFFICE VISIT (OUTPATIENT)
Dept: CARDIOLOGY CLINIC | Age: 75
End: 2020-07-30
Payer: MEDICARE

## 2020-07-30 VITALS
HEART RATE: 60 BPM | DIASTOLIC BLOOD PRESSURE: 78 MMHG | WEIGHT: 198 LBS | SYSTOLIC BLOOD PRESSURE: 130 MMHG | HEIGHT: 63 IN | BODY MASS INDEX: 35.08 KG/M2

## 2020-07-30 PROCEDURE — 99214 OFFICE O/P EST MOD 30 MIN: CPT | Performed by: INTERNAL MEDICINE

## 2020-07-30 PROCEDURE — 4040F PNEUMOC VAC/ADMIN/RCVD: CPT | Performed by: INTERNAL MEDICINE

## 2020-07-30 PROCEDURE — G8417 CALC BMI ABV UP PARAM F/U: HCPCS | Performed by: INTERNAL MEDICINE

## 2020-07-30 PROCEDURE — 1090F PRES/ABSN URINE INCON ASSESS: CPT | Performed by: INTERNAL MEDICINE

## 2020-07-30 PROCEDURE — G8427 DOCREV CUR MEDS BY ELIG CLIN: HCPCS | Performed by: INTERNAL MEDICINE

## 2020-07-30 PROCEDURE — 1123F ACP DISCUSS/DSCN MKR DOCD: CPT | Performed by: INTERNAL MEDICINE

## 2020-07-30 PROCEDURE — G8400 PT W/DXA NO RESULTS DOC: HCPCS | Performed by: INTERNAL MEDICINE

## 2020-07-30 PROCEDURE — 3017F COLORECTAL CA SCREEN DOC REV: CPT | Performed by: INTERNAL MEDICINE

## 2020-07-30 PROCEDURE — 1036F TOBACCO NON-USER: CPT | Performed by: INTERNAL MEDICINE

## 2020-07-30 RX ORDER — CLOPIDOGREL BISULFATE 75 MG/1
75 TABLET ORAL DAILY
Qty: 90 TABLET | Refills: 3 | Status: SHIPPED | OUTPATIENT
Start: 2020-07-30 | End: 2021-09-13 | Stop reason: SDUPTHER

## 2020-07-30 NOTE — PROGRESS NOTES
(TORADOL) 10 MG tablet, Take 1 tablet by mouth every 6 hours as needed for Pain, Disp: 20 tablet, Rfl: 0    Past Medical History  Jonathan Timmons  has no past medical history on file. Social History  Keyanna  reports that she has never smoked. She has never used smokeless tobacco. She reports previous alcohol use. She reports that she does not use drugs. Family History  Keyanna family history is not on file. There is no family history of bicuspid aortic valve, aneurysms, heart transplant, pacemakers, defibrillators, or sudden cardiac death. Past Surgical History   Past Surgical History:   Procedure Laterality Date    CAROTID STENT PLACEMENT      CHOLECYSTECTOMY      CYSTO/URETERO/PYELOSCOPY, CALCULUS TX N/A 5/8/2019    CYSTO, LEFT URETEROSCOPY, URETERORENOSCOPY, LASER LITOHTRIPSY, BASKET RETRIEVAL OF STONE FRAGMENTS, LEFT URETERAL STENT performed by Lloyd Catherine MD at [address], [facility]      OVARY REMOVAL         Review of Systems   Constitutional: Negative for chills and fever  HENT: Negative for congestion, sinus pressure, sneezing and sore throat. Eyes: Negative for pain, discharge, redness and itching. Respiratory: Negative for apnea, cough  Gastrointestinal: Negative for blood in stool, constipation, diarrhea   Endocrine: Negative for cold intolerance, heat intolerance, polydipsia. Genitourinary: Negative for dysuria, enuresis, flank pain and hematuria. Musculoskeletal: Negative for arthralgias, joint swelling and neck pain. Neurological: Negative for numbness and headaches. Psychiatric/Behavioral: Negative for agitation, confusion, decreased concentration and dysphoric mood.      Objective:     /78   Pulse 60   Ht 5' 3\" (1.6 m)   Wt 198 lb (89.8 kg)   BMI 35.07 kg/m²     Wt Readings from Last 3 Encounters:   07/30/20 198 lb (89.8 kg)   06/22/20 185 lb (83.9 kg)   06/05/20 199 lb (90.3 kg)     BP Readings from Last 3 Encounters:   07/30/20 130/78   06/05/20 126/70   05/16/19 118/68       Nursing note and vitals reviewed. Physical Exam   Constitutional: Oriented to person, place, and time. Appears well-developed and well-nourished. HENT:   Head: Normocephalic and atraumatic. Eyes: EOM are normal. Pupils are equal, round, and reactive to light. Neck: Normal range of motion. Neck supple. No JVD present. Cardiovascular: Normal rate, regular rhythm, normal heart sounds and intact distal pulses. No murmur heard. Pulmonary/Chest: Effort normal and breath sounds normal. No respiratory distress. No wheezes. No rales. Abdominal: Soft. Bowel sounds are normal. No distension. There is no tenderness. Musculoskeletal: Normal range of motion. No edema. Neurological: Alert and oriented to person, place, and time. No cranial nerve deficit. Coordination normal.   Skin: Skin is warm and dry. Psychiatric: Normal mood and affect.        No results found for: CKTOTAL, CKMB, CKMBINDEX    Lab Results   Component Value Date    WBC 9.5 03/28/2019    RBC 4.19 03/28/2019    HGB 13.4 03/28/2019    HCT 41.5 03/28/2019    MCV 99.0 03/28/2019    MCH 32.0 03/28/2019    MCHC 32.3 03/28/2019    RDW 12.7 03/28/2019     03/28/2019    MPV 9.7 03/28/2019       Lab Results   Component Value Date     03/28/2019    K 3.7 03/28/2019     03/28/2019    CO2 29 03/28/2019    BUN 22 03/28/2019    CREATININE 0.94 03/28/2019    CALCIUM 9.0 03/28/2019    LABGLOM  03/28/2019      Comment:      eGFR 58    GLUCOSE 132 03/28/2019       No results found for: ALKPHOS, ALT, AST, PROT, BILITOT, BILIDIR, IBILI, LABALBU    No results found for: MG    No results found for: INR, PROTIME      No results found for: LABA1C    No results found for: TRIG, HDL, LDLCALC, LDLDIRECT, LABVLDL    No results found for: TSH      Testing Reviewed:      I have individually reviewed the cardiac test below:    ECHO:   Results for orders placed during the hospital encounter of 06/22/20 Echo 2D w doppler w color complete    Narrative Transthoracic Echocardiography Report (TTE)     Demographics      Patient Name   Lydia Malhotra  Gender               Female                  H      MR #           [id number]     Race                                                    Ethnicity      Account #      [de-identified]     Room Number      Accession      [id number]     Date of Study        06/22/2020   Number      Date of Birth  1945    Referring Physician  Mago Garcia MD      Age            69 year(s)    Domingosoniya Beltran, RDCS, RVT                                   Interpreting         Mathew Garcia MD                                Physician     Procedure    Type of Study      TTE procedure:ECHOCARDIOGRAM COMPLETE 2D W DOPPLER W COLOR. Procedure Date  Date: 06/22/2020 Start: 12:37 PM    Study Location: Echo Lab  Technical Quality: Limited visualization due to body habitus. Indications:Shortness of breath. Additional Medical History:fatigue, Dyspnea on exertion. Patient Status: Routine    Height: 63 inches Weight: 199 pounds BSA: 1.93 m^2 BMI: 35.25 kg/m^2    BP: 126/70 mmHg     Conclusions      Summary   Ejection fraction was estimated at 50-55%. E/A flow reversal noted. Suggestive of diastolic. Ascending aorta = 3.6 cm. IVC size is within normal limits with normal respiratory phasic changes. Signature      ----------------------------------------------------------------   Electronically signed by Mathew Garcia MD (Interpreting   physician) on 06/22/2020 at 04:15 PM   ----------------------------------------------------------------      Findings      Mitral Valve   The mitral valve structure was normal with normal leaflet separation.    DOPPLER: The transmitral velocity was within the normal range with no   evidence for mitral stenosis. There was no evidence of mitral   regurgitation. Aortic Valve   The aortic valve was trileaflet with normal thickness and cuspal   separation. DOPPLER: Transaortic velocity was within the normal range with   no evidence of aortic stenosis. There was mild aortic regurgitation. Tricuspid Valve   The tricuspid valve structure was normal with normal leaflet separation. DOPPLER: There was no evidence of tricuspid stenosis. There was no   evidence of tricuspid regurgitation. Pulmonic Valve   The pulmonic valve leaflets were not well seen. DOPPLER: The transpulmonic   velocity was within the normal range with no evidence for regurgitation. Left Atrium   Left atrial size was normal.      Left Ventricle   Normal left ventricular size and systolic function. There were no regional wall motion abnormalities. Wall thickness was within normal limits. Ejection fraction was estimated at 50-55%. E/A flow reversal noted. Suggestive of diastolic dysfunction. Right Atrium   Right atrial size was normal.      Right Ventricle   The right ventricular size was normal with normal systolic function and   wall thickness. Pericardial Effusion   The pericardium was normal in appearance with no evidence of a pericardial   effusion. Pleural Effusion   No evidence of pleural effusion. Aorta / Great Vessels   -Ascending aorta = 3.6 cm. -IVC size is within normal limits with normal respiratory phasic changes.      M-Mode/2D Measurements & Calculations      LV Diastolic   LV Systolic Dimension:    AV Cusp Separation: 2.2 cmLA   Dimension: 4.5 3.2 cm                    Dimension: 3.8 cmAO Root   cm             LV Volume Diastolic: 36.1 Dimension: 3.1 cmLA Area: 13.7   LV FS:28.9 %   ml                        cm^2   LV PW          LV Volume Systolic: 41 ml   Diastolic: 0.8 LV EDV/LV EDV Index: 92.4   cm             ml/48 m^2LV ESV/LV ESV   Septum         Index: 41 ml/21 m^2       RV Diastolic Dimension: 4 cm   Diastolic: 1.2 EF Calculated: 55.6 %   cm                                       LA/Aorta: 1.23                                            Ascending Aorta: 3.6 cm                                            LA volume/Index: 33.2 ml /17m^2     Doppler Measurements & Calculations      MV Peak E-Wave: 63.8 AV Peak Velocity: 101 LVOT Peak Velocity: 78.3 cm/s   cm/s                 cm/s                  LVOT Mean Velocity: 53.1 cm/s   MV Peak A-Wave: 87   AV Peak Gradient:     LVOT Peak Gradient: 2 mmHgLVOT   cm/s                 4.08 mmHg             Mean Gradient: 1 mmHg   MV E/A Ratio: 0.73   AV Mean Velocity:   MV Peak Gradient:    69.8 cm/s             TV Peak E-Wave: 51.4 cm/s   1.63 mmHg            AV Mean Gradient: 2   TV Peak A-Wave: 41.1 cm/s                        mmHg   MV Deceleration      AV VTI: 25.5 cm       TV Peak Gradient: 1.06 mmHg   Time: 208 msec                             TR Velocity:225 cm/s                                              TR Gradient:20.25 mmHg                        LVOT VTI: 20 cm       PV Peak Velocity: 61.7 cm/s   MV E' Septal         AV P1/2t: 607 msec    PV Peak Gradient: 1.52 mmHg   Velocity: 7 cm/s     IVRT: 127 msec   MV A' Septal   Velocity: 8.4 cm/s   MV E' Lateral        AV DVI (VTI): 0.78AV   Velocity: 9.6 cm/s   DVI (Vmax):0.78   MV A' Lateral   Velocity: 10.4 cm/s   E/E' septal: 9.11   E/E' lateral: 6.65   MR Velocity: 338   cm/s     [email or web address] [id number]        Assessment/Plan   SOB  HORTON  Atypical chest pressure  Hx of PCI  Grade 1 DD  Discussed that she has no obvious findings but EF is preserved, and there is grade 1 DD. She has no volume. She has tried to follow up with Pulmonary with inhalers but has not helped her. BP and HR well controlled. Next step is the perform R/LHC to evaluate intracardiac pressures and coronaries.  R/B/A of cardiac cath discussed and she wants to proceed. Cr stable. Discussed diet/exercise/BP/weight loss/health lifestyle choices/lipids; the patient understands the goals and will try to comply.     Disposition:  [facility]           Electronically signed by Eitan Reich MD   7/30/2020 at 9:30 AM EDT

## 2020-08-07 ENCOUNTER — HOSPITAL ENCOUNTER (OUTPATIENT)
Age: 75
Discharge: HOME | End: 2020-08-07
Payer: MEDICARE

## 2020-08-07 PROCEDURE — U0003 INFECTIOUS AGENT DETECTION BY NUCLEIC ACID (DNA OR RNA); SEVERE ACUTE RESPIRATORY SYNDROME CORONAVIRUS 2 (SARS-COV-2) (CORONAVIRUS DISEASE [COVID-19]), AMPLIFIED PROBE TECHNIQUE, MAKING USE OF HIGH THROUGHPUT TECHNOLOGIES AS DESCRIBED BY CMS-2020-01-R: HCPCS

## 2020-08-10 LAB — SARS-COV-2: NOT DETECTED

## 2020-08-10 NOTE — FLOWSHEET NOTE
NPO after midnight  Bring drivers license and insurance information  Wear comfortable clean clothes  Shower morning of and night before with liquid antibacterial soap  Remove jewelry   May have to stay overnight if have PTCA/stent  Bring medications in original bottles  Made aware of visitors limit to 1 at a time  Follow all instructions given by your physician  Please notify doctor office if you need to cancel or reschedule your procedure   needed at discharge. PAT done per message left.

## 2020-08-12 ENCOUNTER — PREP FOR PROCEDURE (OUTPATIENT)
Dept: CARDIOLOGY | Age: 75
End: 2020-08-12

## 2020-08-12 RX ORDER — SODIUM CHLORIDE 0.9 % (FLUSH) 0.9 %
10 SYRINGE (ML) INJECTION PRN
Status: CANCELLED | OUTPATIENT
Start: 2020-08-12

## 2020-08-12 RX ORDER — NITROGLYCERIN 0.4 MG/1
0.4 TABLET SUBLINGUAL EVERY 5 MIN PRN
Status: CANCELLED | OUTPATIENT
Start: 2020-08-12

## 2020-08-12 RX ORDER — SODIUM CHLORIDE 9 MG/ML
INJECTION, SOLUTION INTRAVENOUS CONTINUOUS
Status: CANCELLED | OUTPATIENT
Start: 2020-08-12

## 2020-08-12 RX ORDER — DIPHENHYDRAMINE HYDROCHLORIDE 50 MG/ML
50 INJECTION INTRAMUSCULAR; INTRAVENOUS ONCE
Status: CANCELLED | OUTPATIENT
Start: 2020-08-12 | End: 2020-08-12

## 2020-08-12 RX ORDER — ASPIRIN 325 MG
325 TABLET ORAL ONCE
Status: CANCELLED | OUTPATIENT
Start: 2020-08-12 | End: 2020-08-12

## 2020-08-12 RX ORDER — SODIUM CHLORIDE 0.9 % (FLUSH) 0.9 %
10 SYRINGE (ML) INJECTION EVERY 12 HOURS SCHEDULED
Status: CANCELLED | OUTPATIENT
Start: 2020-08-12

## 2020-08-13 ENCOUNTER — HOSPITAL ENCOUNTER (OUTPATIENT)
Dept: INPATIENT UNIT | Age: 75
Discharge: HOME | End: 2020-08-13
Attending: INTERNAL MEDICINE | Admitting: INTERNAL MEDICINE
Payer: MEDICARE

## 2020-08-13 VITALS
BODY MASS INDEX: 32.78 KG/M2 | HEART RATE: 65 BPM | RESPIRATION RATE: 20 BRPM | SYSTOLIC BLOOD PRESSURE: 109 MMHG | DIASTOLIC BLOOD PRESSURE: 96 MMHG | OXYGEN SATURATION: 95 % | TEMPERATURE: 97.8 F | HEIGHT: 63 IN | WEIGHT: 185 LBS

## 2020-08-13 LAB
ABO: NORMAL
ALBUMIN SERPL-MCNC: 3.8 G/DL (ref 3.5–5.1)
ALP BLD-CCNC: 53 U/L (ref 38–126)
ALT SERPL-CCNC: 8 U/L (ref 11–66)
ANION GAP SERPL CALCULATED.3IONS-SCNC: 7 MEQ/L (ref 8–16)
ANION GAP SERPL CALCULATED.3IONS-SCNC: 9 MEQ/L (ref 8–16)
ANTIBODY SCREEN: NORMAL
APTT: 28.9 SECONDS (ref 22–38)
AST SERPL-CCNC: 12 U/L (ref 5–40)
BILIRUB SERPL-MCNC: 0.4 MG/DL (ref 0.3–1.2)
BUN BLDV-MCNC: 19 MG/DL (ref 7–22)
BUN BLDV-MCNC: 19 MG/DL (ref 7–22)
CALCIUM SERPL-MCNC: 8.7 MG/DL (ref 8.5–10.5)
CALCIUM SERPL-MCNC: 8.7 MG/DL (ref 8.5–10.5)
CHLORIDE BLD-SCNC: 108 MEQ/L (ref 98–111)
CHLORIDE BLD-SCNC: 109 MEQ/L (ref 98–111)
CHOLESTEROL, TOTAL: 138 MG/DL (ref 100–199)
CO2: 26 MEQ/L (ref 23–33)
CO2: 27 MEQ/L (ref 23–33)
COLLECTED BY:: ABNORMAL
COLLECTED BY:: NORMAL
CREAT SERPL-MCNC: 0.7 MG/DL (ref 0.4–1.2)
CREAT SERPL-MCNC: 0.7 MG/DL (ref 0.4–1.2)
EKG ATRIAL RATE: 57 BPM
EKG P AXIS: 34 DEGREES
EKG P-R INTERVAL: 186 MS
EKG Q-T INTERVAL: 436 MS
EKG QRS DURATION: 96 MS
EKG QTC CALCULATION (BAZETT): 424 MS
EKG R AXIS: 57 DEGREES
EKG T AXIS: 55 DEGREES
EKG VENTRICULAR RATE: 57 BPM
ERYTHROCYTE [DISTWIDTH] IN BLOOD BY AUTOMATED COUNT: 13.1 % (ref 11.5–14.5)
ERYTHROCYTE [DISTWIDTH] IN BLOOD BY AUTOMATED COUNT: 49.9 FL (ref 35–45)
GFR SERPL CREATININE-BSD FRML MDRD: 82 ML/MIN/1.73M2
GFR SERPL CREATININE-BSD FRML MDRD: 82 ML/MIN/1.73M2
GLUCOSE BLD-MCNC: 86 MG/DL (ref 70–108)
GLUCOSE BLD-MCNC: 88 MG/DL (ref 70–108)
HCT VFR BLD CALC: 40.6 % (ref 37–47)
HDLC SERPL-MCNC: 51 MG/DL
HEMOGLOBIN: 13.4 GM/DL (ref 12–16)
INR BLD: 1.09 (ref 0.85–1.13)
LDL CHOLESTEROL CALCULATED: 62 MG/DL
MCH RBC QN AUTO: 34.7 PG (ref 26–33)
MCHC RBC AUTO-ENTMCNC: 33 GM/DL (ref 32.2–35.5)
MCV RBC AUTO: 105.2 FL (ref 81–99)
PLATELET # BLD: 128 THOU/MM3 (ref 130–400)
PMV BLD AUTO: 9.8 FL (ref 9.4–12.4)
POC O2 SATURATION: 74 % (ref 94–97)
POC O2 SATURATION: 97 % (ref 94–97)
POTASSIUM REFLEX MAGNESIUM: 3.8 MEQ/L (ref 3.5–5.2)
POTASSIUM REFLEX MAGNESIUM: 3.9 MEQ/L (ref 3.5–5.2)
RBC # BLD: 3.86 MILL/MM3 (ref 4.2–5.4)
RH FACTOR: NORMAL
SODIUM BLD-SCNC: 143 MEQ/L (ref 135–145)
SODIUM BLD-SCNC: 143 MEQ/L (ref 135–145)
SOURCE, BLOOD GAS: ABNORMAL
SOURCE, BLOOD GAS: NORMAL
TOTAL PROTEIN: 6 G/DL (ref 6.1–8)
TRIGL SERPL-MCNC: 124 MG/DL (ref 0–199)
WBC # BLD: 4.6 THOU/MM3 (ref 4.8–10.8)

## 2020-08-13 PROCEDURE — 36415 COLL VENOUS BLD VENIPUNCTURE: CPT

## 2020-08-13 PROCEDURE — 93005 ELECTROCARDIOGRAM TRACING: CPT | Performed by: NURSE PRACTITIONER

## 2020-08-13 PROCEDURE — 85610 PROTHROMBIN TIME: CPT

## 2020-08-13 PROCEDURE — 80061 LIPID PANEL: CPT

## 2020-08-13 PROCEDURE — 2709999900 HC NON-CHARGEABLE SUPPLY

## 2020-08-13 PROCEDURE — 2500000003 HC RX 250 WO HCPCS

## 2020-08-13 PROCEDURE — 93010 ELECTROCARDIOGRAM REPORT: CPT | Performed by: INTERNAL MEDICINE

## 2020-08-13 PROCEDURE — 93460 R&L HRT ART/VENTRICLE ANGIO: CPT | Performed by: INTERNAL MEDICINE

## 2020-08-13 PROCEDURE — 6360000004 HC RX CONTRAST MEDICATION: Performed by: INTERNAL MEDICINE

## 2020-08-13 PROCEDURE — 6360000002 HC RX W HCPCS

## 2020-08-13 PROCEDURE — 86850 RBC ANTIBODY SCREEN: CPT

## 2020-08-13 PROCEDURE — 85730 THROMBOPLASTIN TIME PARTIAL: CPT

## 2020-08-13 PROCEDURE — 6370000000 HC RX 637 (ALT 250 FOR IP): Performed by: NURSE PRACTITIONER

## 2020-08-13 PROCEDURE — 82810 BLOOD GASES O2 SAT ONLY: CPT

## 2020-08-13 PROCEDURE — 86901 BLOOD TYPING SEROLOGIC RH(D): CPT

## 2020-08-13 PROCEDURE — C1887 CATHETER, GUIDING: HCPCS

## 2020-08-13 PROCEDURE — C1769 GUIDE WIRE: HCPCS

## 2020-08-13 PROCEDURE — 86900 BLOOD TYPING SEROLOGIC ABO: CPT

## 2020-08-13 PROCEDURE — 2580000003 HC RX 258: Performed by: NURSE PRACTITIONER

## 2020-08-13 PROCEDURE — 85027 COMPLETE CBC AUTOMATED: CPT

## 2020-08-13 PROCEDURE — 80053 COMPREHEN METABOLIC PANEL: CPT

## 2020-08-13 PROCEDURE — C1894 INTRO/SHEATH, NON-LASER: HCPCS

## 2020-08-13 RX ORDER — ASPIRIN 325 MG
325 TABLET ORAL ONCE
Status: COMPLETED | OUTPATIENT
Start: 2020-08-13 | End: 2020-08-13

## 2020-08-13 RX ORDER — SODIUM CHLORIDE 0.9 % (FLUSH) 0.9 %
10 SYRINGE (ML) INJECTION PRN
Status: DISCONTINUED | OUTPATIENT
Start: 2020-08-13 | End: 2020-08-13 | Stop reason: SDUPTHER

## 2020-08-13 RX ORDER — FUROSEMIDE 40 MG/1
40 TABLET ORAL 2 TIMES DAILY
Qty: 60 TABLET | Refills: 3 | Status: SHIPPED | OUTPATIENT
Start: 2020-08-13 | End: 2021-01-14 | Stop reason: SDUPTHER

## 2020-08-13 RX ORDER — SODIUM CHLORIDE 9 MG/ML
INJECTION, SOLUTION INTRAVENOUS CONTINUOUS
Status: DISCONTINUED | OUTPATIENT
Start: 2020-08-13 | End: 2020-08-13 | Stop reason: SDUPTHER

## 2020-08-13 RX ORDER — NITROGLYCERIN 0.4 MG/1
0.4 TABLET SUBLINGUAL EVERY 5 MIN PRN
Status: DISCONTINUED | OUTPATIENT
Start: 2020-08-13 | End: 2020-08-13 | Stop reason: HOSPADM

## 2020-08-13 RX ORDER — ACETAMINOPHEN 325 MG/1
650 TABLET ORAL EVERY 4 HOURS PRN
Status: DISCONTINUED | OUTPATIENT
Start: 2020-08-13 | End: 2020-08-13 | Stop reason: HOSPADM

## 2020-08-13 RX ORDER — SODIUM CHLORIDE 0.9 % (FLUSH) 0.9 %
10 SYRINGE (ML) INJECTION EVERY 12 HOURS SCHEDULED
Status: DISCONTINUED | OUTPATIENT
Start: 2020-08-13 | End: 2020-08-13 | Stop reason: HOSPADM

## 2020-08-13 RX ORDER — SODIUM CHLORIDE 9 MG/ML
INJECTION, SOLUTION INTRAVENOUS CONTINUOUS
Status: DISCONTINUED | OUTPATIENT
Start: 2020-08-13 | End: 2020-08-13 | Stop reason: HOSPADM

## 2020-08-13 RX ORDER — SODIUM CHLORIDE 0.9 % (FLUSH) 0.9 %
10 SYRINGE (ML) INJECTION EVERY 12 HOURS SCHEDULED
Status: DISCONTINUED | OUTPATIENT
Start: 2020-08-13 | End: 2020-08-13 | Stop reason: SDUPTHER

## 2020-08-13 RX ORDER — ATROPINE SULFATE 0.4 MG/ML
0.5 AMPUL (ML) INJECTION
Status: DISCONTINUED | OUTPATIENT
Start: 2020-08-13 | End: 2020-08-13 | Stop reason: HOSPADM

## 2020-08-13 RX ORDER — SODIUM CHLORIDE 0.9 % (FLUSH) 0.9 %
10 SYRINGE (ML) INJECTION PRN
Status: DISCONTINUED | OUTPATIENT
Start: 2020-08-13 | End: 2020-08-13 | Stop reason: HOSPADM

## 2020-08-13 RX ADMIN — ASPIRIN 325 MG: 325 TABLET, FILM COATED ORAL at 07:10

## 2020-08-13 RX ADMIN — IOPAMIDOL 70 ML: 755 INJECTION, SOLUTION INTRAVENOUS at 10:31

## 2020-08-13 RX ADMIN — SODIUM CHLORIDE: 9 INJECTION, SOLUTION INTRAVENOUS at 07:10

## 2020-08-13 NOTE — PRE SEDATION
[address] [place] [address]  Sedation/Analgesia History & Physical    Pt Name: Raul Lomax  Account number: [de-identified]  MRN: [id number]  YOB: 1945  Provider Performing Procedure: Rashard Obrien MD  Referring Provider: Rashard Obrien MD   Primary Care Physician: Daja Plummer MD  Date: 8/13/2020    PRE-PROCEDURE    Code Status: FULL CODE  Brief History/Pre-Procedure Diagnosis:   Severe HORTON  Atypical chest pressure  Hx of PCI  OMT    Consent: : I have discussed with the patient risks, benefits, and alternatives (and relevant risks, benefits, and side effects related to alternatives or not receiving care), and likelihood of the success. The patient and/or representative appear to understand and agree to proceed. The discussion encompasses risks, benefits, and side effects related to the alternatives and the risks related to not receiving the proposed care, treatment, and services. The indication, risks and benefits of the procedure and possible therapeutic consequences and alternatives were discussed with the patient. The patient was given the opportunity to ask questions and to have them answered to his/her satisfaction. Risks of the procedure include but are not limited to the following: Bleeding, hematoma including retroperitoneal hemmorhage, infection, pain and discomfort, injury to the aorta and other blood vessels, rhythm disturbance, low blood pressure, myocardial infarction, stroke, kidney damage/failure, myocardial perforation, allergic reactions to sedatives/contrast material, loss of pulse/vascular compromise requiring surgery, aneurysm/pseudoaneurysm formation, possible loss of a limb/hand/leg, needing blood transfusion, requiring emergent open heart surgery or emergent coronary intervention, the need for intubation/respiratory support, the requirement for defibrillation/cardioversion, and death. Alternatives to and omission of the suggested procedure were discussed. Historical Provider, MD   isosorbide mononitrate (IMDUR) 30 MG extended release tablet Take 30 mg by mouth 2 times daily    Yes Historical Provider, MD   simvastatin (ZOCOR) 10 MG tablet Take 10 mg by mouth nightly   Yes Historical Provider, MD   furosemide (LASIX) 20 MG tablet Take 20 mg by mouth 2 times daily    Historical Provider, MD   potassium chloride (KLOR-CON 10) 10 MEQ extended release tablet Take 10 mEq by mouth 2 times daily     Historical Provider, MD     Additional information:       VITAL SIGNS   Vitals:    08/13/20 0700   BP:    Pulse:    Resp:    Temp:    SpO2: 96%       PHYSICAL:   General: No acute distress  HEENT:  Unremarkable for age  Neck: without increased JVD, carotid pulses 2+ bilaterally without bruits  Heart: RRR, S1 & S2 WNL, S4 gallop, without murmurs or rubs   NYHA: 2   Lungs: Clear to auscultation    Abdomen: BS present, without HSM, masses, or tendernes    Extremities: without C,C,E.  Pulses 2+ bilaterally  Mental Status: Alert & Oriented        PLANNED PROCEDURE   [x]Cath  [x]PCI                []Pacemaker/AICD  []JODI             []Cardioversion []Peripheral angiography/PTA  []Other:      SEDATION  Planned agent:[x]Midazolam []Meperidine [x]Sublimaze []Morphine  []Diazepam  []Other:     ASA Classification:  []1 []2 [x]3 []4 []5  Class 1: A normal healthy patient  Class 2: Pt with mild to moderate systemic disease  Class 3: Severe systemic disease or disturbance  Class 4: Severe systemic disorders that are already life threatening. Class 5: Moribund pt with little chances of survival, for more than 24 hours. Mallampati I Airway Classification:   []1 [x]2 []3 []4    [x]Pre-procedure diagnostic studies complete and results available. Comment:    [x]Previous sedation/anesthesia experiences assessed. Comment:    [x]The patient is an appropriate candidate to undergo the planned procedure sedation and anesthesia.  (Refer to nursing sedation/analgesia documentation record)  [x]Formulation and discussion of sedation/procedure plan, risks, and expectations with patient and/or responsible adult completed. [x]Patient examined immediately prior to the procedure.  (Refer to nursing sedation/analgesia documentation record)    Vanessa Nelson MD   Electronically signed 8/13/2020 at 8:44 AM

## 2020-08-13 NOTE — FLOWSHEET NOTE
dsicharge instructions with AVS review completed. dtr present. Questions and concerns addressed. Right wrist and antecube sites stable. Armboard cont. Iv fluids stopped. Iv catheter removed. Telemetry off. Preparing for discharge.

## 2020-08-13 NOTE — PLAN OF CARE
Problem: Discharge Planning:  Goal: Participates in care planning  Description: Participates in care planning  Outcome: Ongoing     Problem: Discharge Planning:  Goal: Discharged to appropriate level of care  Description: Discharged to appropriate level of care  Outcome: Ongoing     Problem: Airway Clearance - Ineffective:  Goal: Ability to maintain a clear airway will improve  Description: Ability to maintain a clear airway will improve  Outcome: Ongoing     Problem: Tissue Perfusion - Cardiopulmonary, Altered:  Goal: Absence of angina  Description: Absence of angina  Outcome: Ongoing     Problem: Tissue Perfusion - Cardiopulmonary, Altered:  Goal: Hemodynamic stability will improve  Description: Hemodynamic stability will improve  Outcome: Ongoing

## 2020-08-13 NOTE — FLOWSHEET NOTE
Patient admitted to 2E Ambulatory for heart cath  Patient NPO. Patient accompanied by her dtr,. Vital signs obtained. Assessment and data collection intiated. Oriented to room. Policies and procedures for  explained. All questions answered with no further questions at this time. Fall prevention and safety precautions discussed with patient. Explained patients right to have family, representative or physician notified of their admission.   Patient has declined

## 2020-08-13 NOTE — FLOWSHEET NOTE
Received form cath lab. Right wrist and right antecubital cath sites stable. Armboard cont. Pt aware to keep right arm stil and to not lift, push or pull with it. 0.9 normal saline cont. Resting with easy resp. Denies pain or needs.

## 2020-08-16 NOTE — PROCEDURES
[address] [id number]                            CARDIAC CATHETERIZATION    PATIENT NAME: Jennyfer Mail                     :        1945  MED REC NO:   [id number]                           ROOM:       0004  ACCOUNT NO:   [de-identified]                           ADMIT DATE: 2020  PROVIDER:     Christ Cranker, MD    DATE OF PROCEDURE:  2020    INDICATION:  Recurrent dyspnea on exertion, on optimal medical therapy  with a known history of mild CAD. DESCRIPTION OF PROCEDURE:  After informed consent was obtained from the  patient, she was brought to the cardiac catheterization laboratory and  prepped in sterile fashion. Right radial artery and right brachial vein  were chosen as the primary points of access. Preprocedure timeout was  completed. After infiltration of the right wrist with 2% lidocaine  using micropuncture and modified Seldinger technique under fluoroscopic  guidance and ultrasound guidance, I was able to insert a 6-Malagasy  Slender sheath into the right radial artery. After infiltration of the  right brachial vein with 2% lidocaine using micropuncture, modified  Seldinger technique under fluoroscopic guidance and ultrasound guidance,  I was able to insert a 5-Malagasy sheath in the right brachial vein. I  then performed a diagnostic right heart catheterization using 5-Malagasy  balloon-tipped Luz wedge catheter and left heart catheterization  using 5-Malagasy JL-3.5 and 5-Malagasy JR-4 catheters. RIGHT HEART CATHETERIZATION:  RA 16, RV 41/20, PA 41/23 with a mean of  29, wedge pressure 20, PA saturation is 74%, cardiac output is 4,  cardiac index 2.1. CORONARY ANGIOGRAM:  LEFT MAIN:  Patent without any significant obstruction. LAD:  With mid 50% stenosis, although there is no significant  obstruction, gives rise to a large diagonal 1 and 2 branches with 70%  stenosis in the proximal diagonal 1. There is no significant  obstruction. LEFT CIRCUMFLEX:  No significant obstruction proximally. Mild luminal  irregularities are seen. It gives rise to a large OM1 system with a  stent in the proximal segment without any significant obstruction. AV  groove branch has no significant obstruction. RCA:  Moderate luminal irregularities with 30% to 40% stenosis  proximally and in the mid vessel. It bifurcates into a large PLB and  PDA. PDA has about 70% to 80% stenosis. LV:  LV systolic pressure is 06% to 60%. Aortic valve is tricuspid. No  significant aneurysm or dissection of the visualized portions of the  aorta. LV systolic pressure is 646. No significant LV to AO systolic  gradient. LVEDP is 30. IMMEDIATE COMPLICATIONS:  None. MEDICATIONS:  See EMR. ACCESS:  Vasc Band was used for hemostasis. Manual pressure was used  for the right brachial vein. ESTIMATED BLOOD LOSS:  Less than 10 mL. SUMMARY:  PDA with 70% to 80% stenosis; elevated right heart cath  pressures and elevated EDP suggestive of volume overload with preserved  cardiac output. PLAN:  1. Bedrest.  2.  Optimal medical therapy. 3.  Risk factor management. 4.  Routine access site care. 5.  Aggressive diuresis, increase Lasix. Follow up with heart failure  clinic. Avoid salt and reduce fluid intake. We will reassess symptoms  at that time. She has a negative ischemic evaluation. These findings  are likely more incidental rather than cause of her cardiac  decompensation. Majority of her shortness of breath appears to be  volume and pressure related. If she has symptoms despite normalizing  her volume status and her blood pressure, then I would consider  intervention of the PDA. All the above was explained to the patient and the patient's family. They were agreeable and amenable to the plan.         Toya Lowry MD    D: 08/16/2020 9:51:16       T: 08/16/2020 10:21:50     [email or web address]  Job#: [id number] Doc#: [id number]    CC:

## 2020-08-20 ENCOUNTER — TELEPHONE (OUTPATIENT)
Dept: CARDIOLOGY CLINIC | Age: 75
End: 2020-08-20

## 2020-08-20 NOTE — TELEPHONE ENCOUNTER
How many days can patient hold Plavix for Cystoscopy, ureteroscopy, lithotripsy, stent placement on 9-9-2020 with Dr. Covington Yumi?

## 2020-09-03 ENCOUNTER — OFFICE VISIT (OUTPATIENT)
Dept: CARDIOLOGY CLINIC | Age: 75
End: 2020-09-03
Payer: MEDICARE

## 2020-09-03 VITALS
WEIGHT: 187.6 LBS | BODY MASS INDEX: 33.24 KG/M2 | HEIGHT: 63 IN | DIASTOLIC BLOOD PRESSURE: 62 MMHG | SYSTOLIC BLOOD PRESSURE: 106 MMHG | HEART RATE: 56 BPM

## 2020-09-03 PROCEDURE — 1036F TOBACCO NON-USER: CPT | Performed by: NURSE PRACTITIONER

## 2020-09-03 PROCEDURE — G8427 DOCREV CUR MEDS BY ELIG CLIN: HCPCS | Performed by: NURSE PRACTITIONER

## 2020-09-03 PROCEDURE — G8417 CALC BMI ABV UP PARAM F/U: HCPCS | Performed by: NURSE PRACTITIONER

## 2020-09-03 PROCEDURE — 1090F PRES/ABSN URINE INCON ASSESS: CPT | Performed by: NURSE PRACTITIONER

## 2020-09-03 PROCEDURE — 99213 OFFICE O/P EST LOW 20 MIN: CPT | Performed by: NURSE PRACTITIONER

## 2020-09-03 PROCEDURE — 4040F PNEUMOC VAC/ADMIN/RCVD: CPT | Performed by: NURSE PRACTITIONER

## 2020-09-03 PROCEDURE — 1111F DSCHRG MED/CURRENT MED MERGE: CPT | Performed by: NURSE PRACTITIONER

## 2020-09-03 PROCEDURE — 3017F COLORECTAL CA SCREEN DOC REV: CPT | Performed by: NURSE PRACTITIONER

## 2020-09-03 PROCEDURE — G8400 PT W/DXA NO RESULTS DOC: HCPCS | Performed by: NURSE PRACTITIONER

## 2020-09-03 PROCEDURE — 1123F ACP DISCUSS/DSCN MKR DOCD: CPT | Performed by: NURSE PRACTITIONER

## 2020-09-03 NOTE — PROGRESS NOTES
Patient is s/p Cath with Andrew Gibson  08.13.2020- PDA with 70% to 80% stenosis; elevated right heart cath  pressures and elevated EDP suggestive of volume overload with preserved  cardiac output    No stent  SOB with Exertion   Swelling in lower extremity  No chest pain

## 2020-09-03 NOTE — PROGRESS NOTES
[facility] PROFESSIONAL SERVICES  [facility]   [address] [address]   Dept: [phone]   Dept Fax: [phone]   Loc: [phone]      Chief Complaint   Patient presents with   [address] [place] [facility]     08.13.2020 Cath w/ Eugeneifm Scale      F/U from sob with LHC/RHC finding PDA with 70% to 80% stenosis; elevated right heart cath  pressures and elevated EDP suggestive of volume overload with preserved  cardiac output in 68 y/o female with history of mild CAD on OMT, HTN, HLD. Dr. Acacia Mas recommended aggressive diuresis and felt majority of sob was r/t volume overload and elevated pressure in light of negative ischemic workup. She reports feeling better since starting lasix. She has much less sob and MONTRELL. Denies chest pain, palpitations, lightheadedness, dizziness or syncope. She has been weighing herself daily with stable weight and even decreasing 1-2 lbs per week. She has also following sodium and fluid restriction.        Cardiologist:  Dr. Peace Macias:   No fever, no chills, No fatigue or weight loss  Pulmonary:    intermittent dyspnea with exertion, no wheezing  Cardiac:    Denies recent chest pain   GI:     No nausea or vomiting, no abdominal pain  Neuro:    No dizziness or light headedness  Musculoskeletal:  No recent active issues  Extremities:   BLE non pitting edema, good peripheral pulses      Past Medical History:   Diagnosis Date    CAD (coronary artery disease)     PCI     Chronic kidney disease     stones    Hyperlipidemia     Hypertension        Allergies   Allergen Reactions    Codeine Other (See Comments)     Breathing difficulties       Current Outpatient Medications   Medication Sig Dispense Refill    furosemide (LASIX) 40 MG tablet Take 1 tablet by mouth 2 times daily 60 tablet 3    clopidogrel (PLAVIX) 75 MG tablet Take 1 tablet by mouth daily 90 tablet 3    metoprolol tartrate (LOPRESSOR) 25 MG tablet Take 1 tablet by mouth 2 times daily 180 tablet 0    diltiazem (CARDIZEM) 60 MG tablet Take 60 mg by mouth 2 times daily       isosorbide mononitrate (IMDUR) 30 MG extended release tablet Take 30 mg by mouth 2 times daily       potassium chloride (KLOR-CON 10) 10 MEQ extended release tablet Take 10 mEq by mouth 2 times daily       simvastatin (ZOCOR) 10 MG tablet Take 10 mg by mouth nightly       No current facility-administered medications for this visit. Social History     Socioeconomic History    Marital status:       Spouse name: Farida Herman Number of children: 2    Years of education: None    Highest education level: None   Occupational History    None   Social Needs    Financial resource strain: None    Food insecurity     Worry: None     Inability: None    Transportation needs     Medical: None     Non-medical: None   Tobacco Use    Smoking status: Never Smoker    Smokeless tobacco: Never Used   Substance and Sexual Activity    Alcohol use: Not Currently    Drug use: Never    Sexual activity: Not Currently   Lifestyle    Physical activity     Days per week: None     Minutes per session: None    Stress: None   Relationships    Social connections     Talks on phone: None     Gets together: None     Attends [employer] service: None     Active member of club or organization: None     Attends meetings of clubs or organizations: None     Relationship status: None    Intimate partner violence     Fear of current or ex partner: None     Emotionally abused: None     Physically abused: None     Forced sexual activity: None   Other Topics Concern    None   Social History Narrative    None       Family History   Problem Relation Age of Onset    Cancer Mother     Heart Disease Father     Cancer Sister     Cancer Brother     Cancer Sister    Oletta Do Sister     Cancer Sister        Blood pressure 106/62, pulse 56, height 5' 3\" (1.6 m), weight 187 lb 9.6 zocor    Has f/u with CHF clinic 9/14/20 for further education/optimization as needed   Daily wt, record and take to CHF appt  1500 mg-2000mg sodium restriction, 2 L fluid restriction   Educated on signs/symptoms of heart failure and to report to healthcare provider if:  Weight gain of 2-3 pounds in 1 day or 5 pounds in 1 week  Increased shortness of breath  Shortness of breath while laying down  Cough  Chest pain  Swelling in feet, ankles or legs  Tenderness or bloating in the abdomen  Fatigue   Decreased appetite or nausea   Confusion       Discussed use, benefit, and side effects of prescribed medications. All patient questions answered. Pt voiced understanding. Instructed to continue current medications, diet and exercise. Continue risk factor modification and medical management. Patient agreed with treatment plan. Follow up as directed.     Continue Dr Echo Casey current treatment plan  Follow up with Dr Joseph Campbell as scheduled or sooner if needed

## 2020-09-14 ENCOUNTER — OFFICE VISIT (OUTPATIENT)
Dept: CARDIOLOGY CLINIC | Age: 75
End: 2020-09-14
Payer: MEDICARE

## 2020-09-14 VITALS
HEART RATE: 52 BPM | WEIGHT: 190.2 LBS | SYSTOLIC BLOOD PRESSURE: 103 MMHG | OXYGEN SATURATION: 96 % | DIASTOLIC BLOOD PRESSURE: 60 MMHG | HEIGHT: 63 IN | BODY MASS INDEX: 33.7 KG/M2

## 2020-09-14 LAB
ANION GAP SERPL CALCULATED.3IONS-SCNC: 10 MEQ/L (ref 8–16)
BUN BLDV-MCNC: 18 MG/DL (ref 7–22)
CALCIUM SERPL-MCNC: 9.3 MG/DL (ref 8.5–10.5)
CHLORIDE BLD-SCNC: 102 MEQ/L (ref 98–111)
CO2: 27 MEQ/L (ref 23–33)
CREAT SERPL-MCNC: 0.8 MG/DL (ref 0.4–1.2)
GFR SERPL CREATININE-BSD FRML MDRD: 70 ML/MIN/1.73M2
GLUCOSE BLD-MCNC: 106 MG/DL (ref 70–108)
POTASSIUM SERPL-SCNC: 3.8 MEQ/L (ref 3.5–5.2)
PRO-BNP: 608.1 PG/ML (ref 0–900)
SODIUM BLD-SCNC: 139 MEQ/L (ref 135–145)

## 2020-09-14 PROCEDURE — G8427 DOCREV CUR MEDS BY ELIG CLIN: HCPCS | Performed by: NURSE PRACTITIONER

## 2020-09-14 PROCEDURE — 1090F PRES/ABSN URINE INCON ASSESS: CPT | Performed by: NURSE PRACTITIONER

## 2020-09-14 PROCEDURE — 4040F PNEUMOC VAC/ADMIN/RCVD: CPT | Performed by: NURSE PRACTITIONER

## 2020-09-14 PROCEDURE — 1123F ACP DISCUSS/DSCN MKR DOCD: CPT | Performed by: NURSE PRACTITIONER

## 2020-09-14 PROCEDURE — G8417 CALC BMI ABV UP PARAM F/U: HCPCS | Performed by: NURSE PRACTITIONER

## 2020-09-14 PROCEDURE — 36415 COLL VENOUS BLD VENIPUNCTURE: CPT | Performed by: NURSE PRACTITIONER

## 2020-09-14 PROCEDURE — 1036F TOBACCO NON-USER: CPT | Performed by: NURSE PRACTITIONER

## 2020-09-14 PROCEDURE — G8400 PT W/DXA NO RESULTS DOC: HCPCS | Performed by: NURSE PRACTITIONER

## 2020-09-14 PROCEDURE — 99215 OFFICE O/P EST HI 40 MIN: CPT | Performed by: NURSE PRACTITIONER

## 2020-09-14 PROCEDURE — 3017F COLORECTAL CA SCREEN DOC REV: CPT | Performed by: NURSE PRACTITIONER

## 2020-09-14 RX ORDER — VITAMIN E 268 MG
400 CAPSULE ORAL DAILY
Status: ON HOLD | COMMUNITY
End: 2022-01-18

## 2020-09-14 RX ORDER — SPIRONOLACTONE 25 MG/1
25 TABLET ORAL DAILY
Qty: 30 TABLET | Refills: 3 | Status: SHIPPED | OUTPATIENT
Start: 2020-09-14 | End: 2021-01-14 | Stop reason: SDUPTHER

## 2020-09-14 RX ORDER — BIOTIN 10000 MCG
CAPSULE ORAL
Status: ON HOLD | COMMUNITY
End: 2022-01-18

## 2020-09-14 ASSESSMENT — ENCOUNTER SYMPTOMS
ABDOMINAL DISTENTION: 0
SHORTNESS OF BREATH: 1
COUGH: 0

## 2020-09-14 NOTE — PROGRESS NOTES
Heart Failure Clinic       Visit Date: 9/14/2020  Cardiologist:  Dr. [facility], Dr Alverda Gottron  Primary Care Physician: Dr. Celestine Sheth MD    Ritesh Stringer is a 69 y.o. female who presents today for:  Chief Complaint   Patient presents with    Congestive Heart Failure       HPI:   Ritesh Stringer is a 69 y.o. female who presents to the office for a NEW patient visit in the heart failure clinic. Accompanied by no one  Lives home alone = 2 kids    TYPE HF: Diastolic (EF 27-65%)  Device: no  HX: HTN, HLD,CAD (PCI, several yrs ago-2017ish), Renal stone Evy Campos), significant family hx Cancers   ? ?GENNARO had sleep study in past - not tolerate mask    Dry Wt:  ?? Hospitalization:  None  New pt to [facility] in June   LHC/RHC in August - elevated pressures/volume overload - started on Lasix 40 BID   Referred to CHF clinic    Concerns today: Feeling same since increase in Lasix = urine output same, weights same. LE edema slightly improved. Continues w/ worsening HORTON, more fatigued over past year/so. Is able to complete ADLs, slowly. Walked from parking lot, little winded. Walks 3-4x/week at [employer] for hour or so. Diet: watching now, making adjustments. Does not drink>2L    Patient has:  Chest Pain: No  SOB: HORTON   Orthopnea/PND: slept in chair x 14 years  GENNARO: had sleep study in past - not tolerate mask  Edema: mild  Fatigue:  Yes  Abdominal bloating: No  Cough: no  Appetite: good  Any extra diuretic use: No  Weight gain: no  Home weight: stable  Home blood pressure: usually around 120s, HR 60s    Past Medical History:   Diagnosis Date    CAD (coronary artery disease)     PCI     Chronic kidney disease     stones    Hyperlipidemia     Hypertension      Past Surgical History:   Procedure Laterality Date    APPENDECTOMY      CAROTID STENT PLACEMENT      CHOLECYSTECTOMY      COLONOSCOPY      CYSTO/URETERO/PYELOSCOPY, CALCULUS TX N/A 5/8/2019    CYSTO, LEFT URETEROSCOPY, URETERORENOSCOPY, LASER LITOHTRIPSY, BASKET RETRIEVAL OF STONE FRAGMENTS, LEFT URETERAL STENT performed by Lenny Schofield MD at [address], VAGINAL      KIDNEY STONE SURGERY      OVARY REMOVAL       Family History   Problem Relation Age of Onset    Cancer Mother     Heart Disease Father     Cancer Sister     Cancer Brother     Cancer Sister     Cancer Sister     Cancer Sister     Cancer Sister     Cancer Sister     Cancer Sister      Social History     Tobacco Use    Smoking status: Never Smoker    Smokeless tobacco: Never Used   Substance Use Topics    Alcohol use: Not Currently     Current Outpatient Medications   Medication Sig Dispense Refill    vitamin E 400 UNIT capsule Take 400 Units by mouth daily      Biotin 10 MG CAPS Take by mouth      furosemide (LASIX) 40 MG tablet Take 1 tablet by mouth 2 times daily 60 tablet 3    clopidogrel (PLAVIX) 75 MG tablet Take 1 tablet by mouth daily 90 tablet 3    metoprolol tartrate (LOPRESSOR) 25 MG tablet Take 1 tablet by mouth 2 times daily 180 tablet 0    diltiazem (CARDIZEM) 60 MG tablet Take 60 mg by mouth 2 times daily       isosorbide mononitrate (IMDUR) 30 MG extended release tablet Take 30 mg by mouth 2 times daily       potassium chloride (KLOR-CON 10) 10 MEQ extended release tablet Take 10 mEq by mouth 2 times daily       simvastatin (ZOCOR) 10 MG tablet Take 10 mg by mouth nightly       No current facility-administered medications for this visit. Allergies   Allergen Reactions    Codeine Other (See Comments)     Breathing difficulties       SUBJECTIVE:   Review of Systems   Constitutional: Positive for fatigue. Negative for activity change and appetite change. Respiratory: Positive for shortness of breath (HORTON). Negative for cough. Cardiovascular: Positive for leg swelling (improved). Negative for chest pain and palpitations. Gastrointestinal: Negative for abdominal distention.    Neurological: Negative for weakness, light-headedness and headaches. Hematological: Negative for adenopathy. Psychiatric/Behavioral: Negative for sleep disturbance. OBJECTIVE:   Today's Vitals:  /60   Pulse 52   Ht 5' 3\" (1.6 m)   Wt 190 lb 3.2 oz (86.3 kg)   SpO2 96%   BMI 33.69 kg/m²     Physical Exam  Vitals signs reviewed. Constitutional:       General: She is not in acute distress. Appearance: Normal appearance. She is well-developed. She is not diaphoretic. HENT:      Head: Normocephalic and atraumatic. Eyes:      Conjunctiva/sclera: Conjunctivae normal.   Neck:      Musculoskeletal: Normal range of motion and neck supple. Comments: No JVD  Cardiovascular:      Rate and Rhythm: Normal rate and regular rhythm. Heart sounds: Normal heart sounds. No murmur. Pulmonary:      Effort: Pulmonary effort is normal. No respiratory distress. Breath sounds: Normal breath sounds. No wheezing or rales. Abdominal:      General: Bowel sounds are normal. There is no distension. Palpations: Abdomen is soft. Tenderness: There is no abdominal tenderness. Musculoskeletal: Normal range of motion. Right lower leg: Edema (little puffy, nonpitting) present. Left lower leg: Edema (little puffy, nonpitting) present. Skin:     General: Skin is warm and dry. Capillary Refill: Capillary refill takes less than 2 seconds. Neurological:      Mental Status: She is alert and oriented to person, place, and time. Coordination: Coordination normal.   Psychiatric:         Behavior: Behavior normal.       Wt Readings from Last 3 Encounters:   09/14/20 190 lb 3.2 oz (86.3 kg)   09/03/20 187 lb 9.6 oz (85.1 kg)   08/13/20 185 lb (83.9 kg)     BP Readings from Last 3 Encounters:   09/14/20 103/60   09/03/20 106/62   08/13/20 (!) 109/96     Pulse Readings from Last 3 Encounters:   09/14/20 52   09/03/20 56   08/13/20 65     Body mass index is 33.69 kg/m².     ECHO:       Summary   Ejection fraction was estimated at 50-55%. E/A flow reversal noted. Suggestive of diastolic. Ascending aorta = 3.6 cm. IVC size is within normal limits with normal respiratory phasic changes. Signature      ----------------------------------------------------------------   Electronically signed by Selma Zamarripa MD (Interpreting   physician) on 06/22/2020 at 04:15 PM   ----------------------------------------------------------------      Findings      Mitral Valve   The mitral valve structure was normal with normal leaflet separation. DOPPLER: The transmitral velocity was within the normal range with no   evidence for mitral stenosis. There was no evidence of mitral   regurgitation. Aortic Valve   The aortic valve was trileaflet with normal thickness and cuspal   separation. DOPPLER: Transaortic velocity was within the normal range with   no evidence of aortic stenosis. There was mild aortic regurgitation. Tricuspid Valve   The tricuspid valve structure was normal with normal leaflet separation. DOPPLER: There was no evidence of tricuspid stenosis. There was no   evidence of tricuspid regurgitation. Pulmonic Valve   The pulmonic valve leaflets were not well seen. DOPPLER: The transpulmonic   velocity was within the normal range with no evidence for regurgitation. Left Atrium   Left atrial size was normal.      Left Ventricle   Normal left ventricular size and systolic function. There were no regional wall motion abnormalities. Wall thickness was within normal limits. Ejection fraction was estimated at 50-55%. E/A flow reversal noted. Suggestive of diastolic dysfunction. Right Atrium   Right atrial size was normal.      Right Ventricle   The right ventricular size was normal with normal systolic function and   wall thickness. Pericardial Effusion   The pericardium was normal in appearance with no evidence of a pericardial   effusion.       Pleural Effusion   No evidence of pleural effusion. Aorta / Great Vessels   -Ascending aorta = 3.6 cm. -IVC size is within normal limits with normal respiratory phasic changes    CATH/STRESS:   SUMMARY:  PDA with 70% to 80% stenosis; elevated right heart cath  pressures and elevated EDP suggestive of volume overload with preserved  cardiac output.     RIGHT HEART CATHETERIZATION:  RA 16, RV 41/20, PA 41/23 with a mean of  29, wedge pressure 20, PA saturation is 74%, cardiac output is 4,  cardiac index 2.1. PLAN:  1. Bedrest.  2.  Optimal medical therapy. 3.  Risk factor management. 4.  Routine access site care. 5.  Aggressive diuresis, increase Lasix. Follow up with heart failure  clinic. Avoid salt and reduce fluid intake. We will reassess symptoms  at that time. She has a negative ischemic evaluation. These findings  are likely more incidental rather than cause of her cardiac  decompensation. Majority of her shortness of breath appears to be  volume and pressure related. If she has symptoms despite normalizing  her volume status and her blood pressure, then I would consider  intervention of the PDA.     All the above was explained to the patient and the patient's family.    They were agreeable and amenable to the plan.        Feliz Dalton MD     D: 08/16/2020 9:51:16     Results reviewed:  BNP: No results found for: BNP  CBC:   Lab Results   Component Value Date    WBC 4.6 08/13/2020    RBC 3.86 08/13/2020    HGB 13.4 08/13/2020    HCT 40.6 08/13/2020     08/13/2020     CMP:    Lab Results   Component Value Date     08/13/2020     08/13/2020    K 3.8 08/13/2020    K 3.9 08/13/2020     08/13/2020     08/13/2020    CO2 26 08/13/2020    CO2 27 08/13/2020    BUN 19 08/13/2020    BUN 19 08/13/2020    CREATININE 0.7 08/13/2020    CREATININE 0.7 08/13/2020    LABGLOM 82 08/13/2020    LABGLOM 82 08/13/2020    GLUCOSE 86 08/13/2020    GLUCOSE 88 08/13/2020    CALCIUM 8.7 08/13/2020    CALCIUM 8.7 08/13/2020     Hepatic Function Panel:    Lab Results   Component Value Date    ALKPHOS 53 08/13/2020    ALT 8 08/13/2020    AST 12 08/13/2020    PROT 6.0 08/13/2020    BILITOT 0.4 08/13/2020    LABALBU 3.8 08/13/2020     Magnesium:  No results found for: MG  PT/INR:    Lab Results   Component Value Date    INR 1.09 08/13/2020     Lipids:    Lab Results   Component Value Date    TRIG 124 08/13/2020    HDL 51 08/13/2020    [facility] 62 08/13/2020       ASSESSMENT AND PLAN:   The patient's condition/symptoms are stable. Diagnosis Orders   1. CHF (congestive heart failure), NYHA class II, chronic, diastolic (HCC)  Basic Metabolic Panel    Brain Natriuretic Peptide    Basic Metabolic Panel    Brain Natriuretic Peptide     Continue:  GDMT:   ACE/ARB/ARNI - None   BB - Lopressor 25 BID   Diuretic - Lasix 40 BID - doubled 4 weeks ago   Vasodilator - Imdur 30 BID   MRA - None  Digoxin - None   Antiarrhythmic - Cardizem 60 BID  Continue Current medications:  Plavix  Stable, appears fairly Euvolemic on exam   No improvement since increase Lasix   BMP, BNP today  Consider changing diuretics or adding Aldactone  Encouraged to continue walking several times/week  Increase activity as able. Dx'd GENNARO in past?? - not tolerate mask per pt. Has seen Pulmonary in past    F/U in 8 weeks. ADDENDUM:   Attempted call pt w/ results - no answer, unable leave message   Add Aldactone 25/day  Repeat BMP, BNP in 2 weeks - has lab slips. Greater than 40 minutes has been spent on education of HF symptoms, management, medication, and plan of care; as well as review of chart: labs, ECHO, radiology reports, etc.   I personally spent more then 50% of the appt time face to face with the patient. New HF Pamphlet given and ZONE sheet reviewed, patient voices understanding. Questions answered.       · Daily weights  · Fluid restriction of 2 Liters per day  · Limit sodium in diet to around 3150-5583 mg/day  · Monitor BP  · Activity as tolerated     Patient was instructed to call the [facility] for any changes in symptoms as noted in AVS.      Return in about 8 weeks (around 11/9/2020). or sooner if needed     Patient given educational materials - see patient instructions. We discussed the importance of weighing oneself and recording daily. We also discussed the importance of a low sodium diet, higher sodium foods to avoid and better low sodium food options. Patient verbalizes understanding of plan of care using teach back method, and is agreeable to the treatment plan.        Electronically signed by NASIM Philip CNP on 9/14/2020 at 10:47 AM

## 2020-09-16 ENCOUNTER — TELEPHONE (OUTPATIENT)
Dept: CARDIOLOGY CLINIC | Age: 75
End: 2020-09-16

## 2020-09-16 NOTE — TELEPHONE ENCOUNTER
Patient returned call and stated she did not  Aldactone yet. Pt was informed that labs done 9-14-20 came back stable and to  Aldactone from [address] [address] to help with her symptoms. Pt informed that once she starts medication we would like to check labs again in 2 weeks. Pt voiced understanding but stated she lost lab slip--mailed another slip to patient.

## 2020-09-16 NOTE — TELEPHONE ENCOUNTER
----- Message from NASIM Ballard CNP sent at 9/16/2020 12:05 PM EDT -----  Call and clarify she got the new RX - Aldactone. Annelise Loyola her know labs were stable and we will add this and see if helps symptoms. She has a lab slip - have her get done in 2 weeks.

## 2020-10-05 ENCOUNTER — HOSPITAL ENCOUNTER (OUTPATIENT)
Age: 75
Discharge: HOME | End: 2020-10-05
Payer: MEDICARE

## 2020-10-05 LAB
REASON FOR REJECTION: NORMAL
REJECTED TEST: NORMAL

## 2020-10-07 ENCOUNTER — HOSPITAL ENCOUNTER (OUTPATIENT)
Age: 75
Discharge: HOME | End: 2020-10-07
Payer: MEDICARE

## 2020-10-07 LAB
ANION GAP SERPL CALCULATED.3IONS-SCNC: 13 MEQ/L (ref 8–16)
BUN BLDV-MCNC: 26 MG/DL (ref 7–22)
CALCIUM SERPL-MCNC: 9.6 MG/DL (ref 8.5–10.5)
CHLORIDE BLD-SCNC: 102 MEQ/L (ref 98–111)
CO2: 27 MEQ/L (ref 23–33)
CREAT SERPL-MCNC: 1.1 MG/DL (ref 0.4–1.2)
GFR SERPL CREATININE-BSD FRML MDRD: 49 ML/MIN/1.73M2
GLUCOSE BLD-MCNC: 94 MG/DL (ref 70–108)
POTASSIUM SERPL-SCNC: 4.7 MEQ/L (ref 3.5–5.2)
SODIUM BLD-SCNC: 142 MEQ/L (ref 135–145)

## 2020-10-07 PROCEDURE — 36415 COLL VENOUS BLD VENIPUNCTURE: CPT

## 2020-10-07 PROCEDURE — 80048 BASIC METABOLIC PNL TOTAL CA: CPT

## 2020-11-17 RX ORDER — DILTIAZEM HYDROCHLORIDE 60 MG/1
60 TABLET, FILM COATED ORAL 2 TIMES DAILY
Qty: 180 TABLET | Refills: 0 | Status: SHIPPED | OUTPATIENT
Start: 2020-11-17 | End: 2021-01-14 | Stop reason: SDUPTHER

## 2020-11-17 NOTE — TELEPHONE ENCOUNTER
Althea Worley called requesting a refill on the following medications:  Requested Prescriptions     Pending Prescriptions Disp Refills    dilTIAZem (CARDIZEM) 60 MG tablet 120 tablet      Sig: Take 1 tablet by mouth 2 times daily     Pharmacy verified:  .[facility] in [place], [place]    Date of last visit: 09/03/2020  Date of next visit (if applicable): 6/90/7166

## 2020-11-19 ENCOUNTER — OFFICE VISIT (OUTPATIENT)
Dept: CARDIOLOGY CLINIC | Age: 75
End: 2020-11-19
Payer: MEDICARE

## 2020-11-19 ENCOUNTER — HOSPITAL ENCOUNTER (OUTPATIENT)
Dept: NON INVASIVE DIAGNOSTICS | Age: 75
Discharge: HOME | End: 2020-11-19
Payer: MEDICARE

## 2020-11-19 VITALS
OXYGEN SATURATION: 98 % | DIASTOLIC BLOOD PRESSURE: 72 MMHG | HEIGHT: 63 IN | WEIGHT: 182.4 LBS | HEART RATE: 76 BPM | BODY MASS INDEX: 32.32 KG/M2 | SYSTOLIC BLOOD PRESSURE: 124 MMHG

## 2020-11-19 LAB
ANION GAP SERPL CALCULATED.3IONS-SCNC: 12 MEQ/L (ref 8–16)
BUN BLDV-MCNC: 29 MG/DL (ref 7–22)
CALCIUM SERPL-MCNC: 10.2 MG/DL (ref 8.5–10.5)
CHLORIDE BLD-SCNC: 103 MEQ/L (ref 98–111)
CO2: 28 MEQ/L (ref 23–33)
CREAT SERPL-MCNC: 1 MG/DL (ref 0.4–1.2)
GFR SERPL CREATININE-BSD FRML MDRD: 54 ML/MIN/1.73M2
GLUCOSE BLD-MCNC: 115 MG/DL (ref 70–108)
MAGNESIUM: 2 MG/DL (ref 1.6–2.4)
POTASSIUM SERPL-SCNC: 4 MEQ/L (ref 3.5–5.2)
SODIUM BLD-SCNC: 143 MEQ/L (ref 135–145)

## 2020-11-19 PROCEDURE — G8400 PT W/DXA NO RESULTS DOC: HCPCS | Performed by: NURSE PRACTITIONER

## 2020-11-19 PROCEDURE — 3017F COLORECTAL CA SCREEN DOC REV: CPT | Performed by: NURSE PRACTITIONER

## 2020-11-19 PROCEDURE — 1036F TOBACCO NON-USER: CPT | Performed by: NURSE PRACTITIONER

## 2020-11-19 PROCEDURE — 4040F PNEUMOC VAC/ADMIN/RCVD: CPT | Performed by: NURSE PRACTITIONER

## 2020-11-19 PROCEDURE — G8484 FLU IMMUNIZE NO ADMIN: HCPCS | Performed by: NURSE PRACTITIONER

## 2020-11-19 PROCEDURE — 36415 COLL VENOUS BLD VENIPUNCTURE: CPT | Performed by: NURSE PRACTITIONER

## 2020-11-19 PROCEDURE — 0296T HC EXT ECG RECORDING 2-21 DAY HOOKUP: CPT

## 2020-11-19 PROCEDURE — 1090F PRES/ABSN URINE INCON ASSESS: CPT | Performed by: NURSE PRACTITIONER

## 2020-11-19 PROCEDURE — 1123F ACP DISCUSS/DSCN MKR DOCD: CPT | Performed by: NURSE PRACTITIONER

## 2020-11-19 PROCEDURE — G8427 DOCREV CUR MEDS BY ELIG CLIN: HCPCS | Performed by: NURSE PRACTITIONER

## 2020-11-19 PROCEDURE — 99214 OFFICE O/P EST MOD 30 MIN: CPT | Performed by: NURSE PRACTITIONER

## 2020-11-19 PROCEDURE — G8417 CALC BMI ABV UP PARAM F/U: HCPCS | Performed by: NURSE PRACTITIONER

## 2020-11-19 PROCEDURE — 93000 ELECTROCARDIOGRAM COMPLETE: CPT | Performed by: NURSE PRACTITIONER

## 2020-11-19 RX ORDER — ISOSORBIDE MONONITRATE 30 MG/1
60 TABLET, EXTENDED RELEASE ORAL 2 TIMES DAILY
Qty: 120 TABLET | Refills: 5 | Status: SHIPPED | OUTPATIENT
Start: 2020-11-19 | End: 2021-01-14 | Stop reason: SDUPTHER

## 2020-11-19 RX ORDER — NITROGLYCERIN 0.4 MG/1
0.4 TABLET SUBLINGUAL EVERY 5 MIN PRN
Qty: 25 TABLET | Refills: 1 | Status: SHIPPED | OUTPATIENT
Start: 2020-11-19 | End: 2022-01-13 | Stop reason: SDUPTHER

## 2020-11-19 ASSESSMENT — ENCOUNTER SYMPTOMS
ABDOMINAL DISTENTION: 0
SHORTNESS OF BREATH: 1
COUGH: 0

## 2020-11-19 NOTE — PROGRESS NOTES
LITOHTRIPSY, BASKET RETRIEVAL OF STONE FRAGMENTS, LEFT URETERAL STENT performed by Juan Romeo MD at [address], VAGINAL      KIDNEY STONE SURGERY      OVARY REMOVAL       Family History   Problem Relation Age of Onset    Cancer Mother     Heart Disease Father     Cancer Sister     Cancer Brother     Cancer Sister     Cancer Sister     Cancer Sister     Cancer Sister     Cancer Sister     Cancer Sister      Social History     Tobacco Use    Smoking status: Never Smoker    Smokeless tobacco: Never Used   Substance Use Topics    Alcohol use: Not Currently     Current Outpatient Medications   Medication Sig Dispense Refill    nitroGLYCERIN (NITROSTAT) 0.4 MG SL tablet Place 1 tablet under the tongue every 5 minutes as needed for Chest pain (Maximum 3 doses) 25 tablet 1    isosorbide mononitrate (IMDUR) 30 MG extended release tablet Take 2 tablets by mouth 2 times daily 120 tablet 5    dilTIAZem (CARDIZEM) 60 MG tablet Take 1 tablet by mouth 2 times daily 180 tablet 0    vitamin E 400 UNIT capsule Take 400 Units by mouth daily      Biotin 10 MG CAPS Take by mouth      spironolactone (ALDACTONE) 25 MG tablet Take 1 tablet by mouth daily 30 tablet 3    furosemide (LASIX) 40 MG tablet Take 1 tablet by mouth 2 times daily 60 tablet 3    clopidogrel (PLAVIX) 75 MG tablet Take 1 tablet by mouth daily 90 tablet 3    metoprolol tartrate (LOPRESSOR) 25 MG tablet Take 1 tablet by mouth 2 times daily 180 tablet 0    potassium chloride (KLOR-CON 10) 10 MEQ extended release tablet Take 10 mEq by mouth 2 times daily       simvastatin (ZOCOR) 10 MG tablet Take 10 mg by mouth nightly       No current facility-administered medications for this visit. Allergies   Allergen Reactions    Codeine Other (See Comments)     Breathing difficulties       SUBJECTIVE:   Review of Systems   Constitutional: Negative for activity change, appetite change and fatigue.    Respiratory: Positive for shortness of breath. Negative for cough. Cardiovascular: Positive for chest pain and palpitations. Negative for leg swelling. Gastrointestinal: Negative for abdominal distention. Neurological: Negative for weakness, light-headedness and headaches. Hematological: Negative for adenopathy. Psychiatric/Behavioral: Negative for sleep disturbance. OBJECTIVE:   Today's Vitals:  /72   Pulse 76   Ht 5' 3\" (1.6 m)   Wt 182 lb 6.4 oz (82.7 kg)   SpO2 98%   BMI 32.31 kg/m²     Physical Exam  Vitals signs reviewed. Constitutional:       General: She is not in acute distress. Appearance: Normal appearance. She is well-developed. She is not diaphoretic. HENT:      Head: Normocephalic and atraumatic. Eyes:      Conjunctiva/sclera: Conjunctivae normal.   Neck:      Musculoskeletal: Normal range of motion and neck supple. Comments: No JVD  Cardiovascular:      Rate and Rhythm: Normal rate and regular rhythm. Heart sounds: Normal heart sounds. No murmur. Pulmonary:      Effort: Pulmonary effort is normal. No respiratory distress. Breath sounds: Normal breath sounds. No wheezing or rales. Abdominal:      General: Bowel sounds are normal. There is no distension. Palpations: Abdomen is soft. Tenderness: There is no abdominal tenderness. Musculoskeletal: Normal range of motion. Right lower leg: No edema (compression stockings on BLE). Left lower leg: No edema. Skin:     General: Skin is warm and dry. Capillary Refill: Capillary refill takes less than 2 seconds. Neurological:      Mental Status: She is alert and oriented to person, place, and time.       Coordination: Coordination normal.   Psychiatric:         Behavior: Behavior normal.         Wt Readings from Last 3 Encounters:   11/19/20 182 lb 6.4 oz (82.7 kg)   09/14/20 190 lb 3.2 oz (86.3 kg)   09/03/20 187 lb 9.6 oz (85.1 kg)     BP Readings from Last 3 Encounters:   11/19/20 124/72 09/14/20 103/60   09/03/20 106/62     Pulse Readings from Last 3 Encounters:   11/19/20 76   09/14/20 52   09/03/20 56     Body mass index is 32.31 kg/m². ECHO:       Summary   Ejection fraction was estimated at 50-55%.  E/A flow reversal noted. Suggestive of diastolic.   Ascending aorta = 3.6 cm. Katie Broussard size is within normal limits with normal respiratory phasic changes.      Signature      ----------------------------------------------------------------   Electronically signed by Lakeisha Sanders MD (Interpreting   [email or web address]) on 06/22/2020 at 04:15 PM   ----------------------------------------------------------------      Findings      Mitral Valve   The mitral valve structure was normal with normal leaflet separation.   DOPPLER: The transmitral velocity was within the normal range with no   evidence for mitral stenosis. There was no evidence of mitral   regurgitation.      Aortic Valve   The aortic valve was trileaflet with normal thickness and cuspal   separation. DOPPLER: Transaortic velocity was within the normal range with   no evidence of aortic stenosis. There was mild aortic regurgitation.      Tricuspid Valve   The tricuspid valve structure was normal with normal leaflet separation.   DOPPLER: There was no evidence of tricuspid stenosis. There was no   evidence of tricuspid regurgitation.      Pulmonic Valve   The pulmonic valve leaflets were not well seen. DOPPLER: The transpulmonic   velocity was within the normal range with no evidence for regurgitation.      Left Atrium   Left atrial size was normal.      Left Ventricle   Normal left ventricular size and systolic function.   There were no regional wall motion abnormalities.   Wall thickness was within normal limits.   Ejection fraction was estimated at 50-55%.  E/A flow reversal noted.  Suggestive of diastolic dysfunction.      Right Atrium   Right atrial size was normal.      Right Ventricle   The right ventricular size was normal with normal 11/19/2020    CALCIUM 10.2 11/19/2020     Hepatic Function Panel:    Lab Results   Component Value Date    ALKPHOS 53 08/13/2020    ALT 8 08/13/2020    AST 12 08/13/2020    PROT 6.0 08/13/2020    BILITOT 0.4 08/13/2020    LABALBU 3.8 08/13/2020     Magnesium:    Lab Results   Component Value Date    MG 2.0 11/19/2020     PT/INR:    Lab Results   Component Value Date    INR 1.09 08/13/2020     Lipids:    Lab Results   Component Value Date    TRIG 124 08/13/2020    HDL 51 08/13/2020    LDLCALC 62 08/13/2020       ASSESSMENT AND PLAN:   The patient's condition/symptoms are Stable: No clinical evidence of fluid overload today. Continue current medical regimen without changes at present time. Diagnosis Orders   1. CHF (congestive heart failure), NYHA class II, chronic, diastolic (HCC)     2. Palpitations  Basic Metabolic Panel    Magnesium    EKG 12 Lead    Continuous cardiac monitoring, >2 up to 14 days   3. Coronary artery disease involving native coronary artery of native heart without angina pectoris     4. Essential hypertension     5. Chest pressure       Continue:  GDMT:   ACE/ARB/ARNI - no   BB - Lopressor 25 BID   Diuretic - Lasix 40 BID, Kcl 10 BID   Vasodilator - Imdur 30 BID - Increase    MRA - Aldactone 25/day  Digoxin - no   Antiarrhythmic - Cardizem 60 BID  Continue Current medications  Stable - appears Euvolemic   Increased Palpitations/Chest pressure/left arm pain = check BMP, Mg today  EKG today  Event monitor x 1 week  Order for SL ntg sent - instructed how use. Increase Imdur 60 BID  Will discuss w/ Dr Gwendolyn Lockhart may need work on \[facility] [place] [facility]\"" ([address] [place] [address] 8/2020)  F/U in 4 weeks or sooner if symptoms worsen.      ADDENDUM:    Labs stable - electrolytes normal.     · Daily weights  · Fluid restriction of 2 Liters per day  · Limit sodium in diet to around 1994-0628 mg/day  · Monitor BP  · Activity as tolerated     Patient was instructed to call the [facility] for any changes in symptoms as noted in AVS. Return in about 6 weeks (around 12/31/2020). or sooner if needed     Patient given educational materials - see patient instructions. We discussed the importance of weighing oneself and recording daily. We also discussed the importance of a low sodium diet, higher sodium foods to avoid and better low sodium food options. Patient verbalizes understanding of plan of care using teach back method, and is agreeable to the treatment plan.        Electronically signed by NASIM Aguilera CNP on 11/19/2020 at 1:26 PM

## 2020-11-23 ENCOUNTER — TELEPHONE (OUTPATIENT)
Dept: CARDIOLOGY CLINIC | Age: 75
End: 2020-11-23

## 2020-11-23 NOTE — TELEPHONE ENCOUNTER
Call and let Bonnetta Brittle know labs were all stable. Inquire if any chest pressure/palpitations over the weekend.

## 2020-11-23 NOTE — TELEPHONE ENCOUNTER
Spoke with Dominique Logan who voiced understanding of her lab results. Dominique Logan stated she did experience some palpitations over the weekend. Patient stated she noticed these when she was doing something/on exertion or if she was lifting something. Patient stated she would stop doing something if she felt the palpitations/pressure start.

## 2020-12-03 NOTE — PROCEDURES
[address] [place] [address]                                 EVENT MONITOR    PATIENT NAME: Laith Haas                     :        1945  MED REC NO:   [id number]                           ROOM:  ACCOUNT NO:   [de-identified]                           ADMIT DATE: 2020  PROVIDER:     Elaine Sullivan MD    TEST TYPE:  One-week event monitor. MONITORING PERIOD:  2020 to 2020. INDICATION:  Palpitations. FINDINGS:  A total of 485,708 QRS complexes were evaluated. Less than  1% of the time, there were ventricular ectopic beats. Less than 1% of  the time, there were supraventricular ectopic beats. There was one run  of SVT with an average heart rate of 72 beats per minute. Heart rate  ranged between 53 to 122 beats per minute. There were 38 sustained  tachycardia episodes for a duration of 1.6% of the total monitoring  period. The fastest tachycardic episode was 150 beats per minute and  the longest was approximately 27 beats per minute. 9.5% of the time,  the patient had bradycardic episodes. The slowest bradycardia was 54  beats per minute. The longest bradycardic episode was 46 beats per  minute. No findings of atrial fibrillation or atrial flutter. No  pauses greater than 3 seconds. The patient complained of chest pain,  shortness of breath, and fatigue on 2020 at 17:30 and at that  time, the heart rate was 70 beats per minute without any significant  rhythm abnormalities noted at that time. Multiple patient's events were  submitted for review, but again no significant rhythm abnormalities  noted at that time, occasional PACs and PVCs. SUMMARY:  No significant abnormalities explained the palpitations, very  occasional PACs and PVCs.         Phill Graves MD    D: 2020 5:54:16       T: 2020 8:08:59     [email or web address]  Job#: [id number]     Doc#: [id number]    CC:

## 2020-12-07 ENCOUNTER — TELEPHONE (OUTPATIENT)
Dept: CARDIOLOGY CLINIC | Age: 75
End: 2020-12-07

## 2020-12-07 NOTE — TELEPHONE ENCOUNTER
Spoke with patient   She is still having pressure/heaviness  And stops and rests when she has symptoms     She has alaways been SOB and worse at times  Dose not keep wt log but states weighs daily and has been stable  Wearing compression stockings and is helping with swelling    She wants to proceed with heart cath with Dr. Homero Bhardwaj given to scheduling     Patient has appointment in CHF clinic Thurs 12/10

## 2020-12-09 NOTE — TELEPHONE ENCOUNTER
Heart cath scheduled 12-18-20 @ 1:00pm  covid screen 12-11-20    Left msg for pt to call office for date time and instructions

## 2020-12-10 ENCOUNTER — OFFICE VISIT (OUTPATIENT)
Dept: CARDIOLOGY CLINIC | Age: 75
End: 2020-12-10
Payer: MEDICARE

## 2020-12-10 VITALS
HEIGHT: 63 IN | HEART RATE: 72 BPM | BODY MASS INDEX: 31.4 KG/M2 | SYSTOLIC BLOOD PRESSURE: 110 MMHG | OXYGEN SATURATION: 95 % | WEIGHT: 177.2 LBS | DIASTOLIC BLOOD PRESSURE: 60 MMHG

## 2020-12-10 PROCEDURE — 1090F PRES/ABSN URINE INCON ASSESS: CPT | Performed by: NURSE PRACTITIONER

## 2020-12-10 PROCEDURE — G8484 FLU IMMUNIZE NO ADMIN: HCPCS | Performed by: NURSE PRACTITIONER

## 2020-12-10 PROCEDURE — 99214 OFFICE O/P EST MOD 30 MIN: CPT | Performed by: NURSE PRACTITIONER

## 2020-12-10 PROCEDURE — 4040F PNEUMOC VAC/ADMIN/RCVD: CPT | Performed by: NURSE PRACTITIONER

## 2020-12-10 PROCEDURE — 1036F TOBACCO NON-USER: CPT | Performed by: NURSE PRACTITIONER

## 2020-12-10 PROCEDURE — 1123F ACP DISCUSS/DSCN MKR DOCD: CPT | Performed by: NURSE PRACTITIONER

## 2020-12-10 PROCEDURE — 3017F COLORECTAL CA SCREEN DOC REV: CPT | Performed by: NURSE PRACTITIONER

## 2020-12-10 PROCEDURE — G8417 CALC BMI ABV UP PARAM F/U: HCPCS | Performed by: NURSE PRACTITIONER

## 2020-12-10 PROCEDURE — G8427 DOCREV CUR MEDS BY ELIG CLIN: HCPCS | Performed by: NURSE PRACTITIONER

## 2020-12-10 PROCEDURE — G8400 PT W/DXA NO RESULTS DOC: HCPCS | Performed by: NURSE PRACTITIONER

## 2020-12-10 ASSESSMENT — ENCOUNTER SYMPTOMS
COUGH: 0
ABDOMINAL DISTENTION: 0
SHORTNESS OF BREATH: 1

## 2020-12-10 NOTE — ADDENDUM NOTE
Addended by: Bruna Membreno on: 12/10/2020 10:41 AM     Modules accepted: Orders Chief complaint  This is a 45y.o. year old female  who presents with a chief complaint of   Chief Complaint   Patient presents with    Follow-up    Asthma       HPI  71-year-old woman with asthma, pulmonary hypertension, tobacco use and obstructive sleep apnea (on CPAP) presents for follow-up. She reports mild shortness of breath with activities like going up an incline. This improves with rest.  She denies cough or sputum production. She denies nighttime awakenings. She is compliant with Symbicort twice daily. She uses Atrovent inhaler 4 times daily scheduled. She remains on Lasix 20 mg daily. She wears CPAP at night. She continues to smoke 1 cigarette every 2 hours. Past medical history:   She was admitted to [facility] from 9/23-9/30/15 for asthma exacerbation. She was treated with steroids, nebulizers, antibiotics and pulmonary toilet. Chest imaging showed atelectasis involving most lobes. There was no evidence of pulmonary embolus. She did have a significant oxygen requirement- needing up to 10L NC. This was eventually weaned to 2L. She was discharged home with this. Supplemental oxygen was discontinued in October 2017. Past Medical History:   Diagnosis Date    Asthma     Atrial fibrillation ([address]     Conduct disorder          Diabetes mellitus ([address]     Diabetes mellitus, type 2 ([address]     Hypothyroidism     Pneumonia     Scoliosis     Scoliosis 1995    Sleep apnea     CPAP       Past Surgical History:   Procedure Laterality Date    APPENDECTOMY      KNEE ARTHROSCOPY Right 07/01/2016    LUNG SURGERY         Current Outpatient Medications   Medication Sig Dispense Refill    ketoconazole (NIZORAL) 2 % shampoo Apply topically once daily until healed.  1 Bottle 2    nystatin-triamcinolone (MYCOLOG) 231507-9.1 UNIT/GM-% ointment Apply topically 2 times daily to breast line and abdomen and then apply powder over the ointment 1 Tube 2    furosemide (LASIX) 20 MG tablet Take 1 tablet by

## 2020-12-10 NOTE — TELEPHONE ENCOUNTER
Per Soy Webster at CHF, pt seen in clinic today, symptoms are better, pt decided does not want heart cath at this time    Heart cath cancelled  Cath lab notified

## 2020-12-10 NOTE — PROGRESS NOTES
Heart Failure Clinic       Visit Date: 12/10/2020  Cardiologist:  Dr. Ascencion Gaitan  Primary Care Physician: Dr. Margret Plascencia MD    Lexi Garcia is a 77 y.o. female who presents today for:  Chief Complaint   Patient presents with    Congestive Heart Failure       HPI:   Lexi Garcia is a 77 y.o. female who presents to the office for a follow up patient visit in the heart failure clinic. Accompanied by no one    TYPE HF: Diastolic (EF 17-53%)  Device: no  HX: HTN, HLD,CAD (PCI, several yrs ago-2017ish), Renal stone Geraline Seller), significant family hx Cancers   ? ?GENNARO had sleep study in past - not tolerate mask     Dry Wt:  ??     Hospitalization:  None  New pt to [facility] in June   LHC/RHC in August - elevated pressures/volume overload - started on Lasix 40 BID   Referred to CHF clinic  OV 9/14 - added Aldactone   Labs 10/7 - stable  Dealing w/ some kidney stones - Dr Ambrosio Foreman 11/19 = c/o CP, palpitations, HORTON = Imdur increased, SL Ntg ordered. 2 wk Holter =  Fairly negative (PVC/PACx) - spoke to Ascencion Gaitan - ok to proceed w/ intervention to PDA if symptoms continued. Concerns today: Feeling better = states heart rate and palpitations have declined. Chest heaviness is improved. Has only taken SL Ntg once (was effective). Would like to hold off on cath   Activity: walked from entrance, no break.   Can complete ADLs, does housework  Diet: watching salt/fluid    Patient has:  Chest Pain: improved  SOB: HORTON improved  Orthopnea/PND: chair for years  GENNARO: not tolerate mask  Edema: no  Fatigue: improved  Abdominal bloating: no  Cough: no  Appetite: good  Any extra diuretic use: no  Weight gain: down 5# in past month  Home weight: stable  Home blood pressure: stable    Past Medical History:   Diagnosis Date    CAD (coronary artery disease)     PCI     Chronic kidney disease     stones    Hyperlipidemia     Hypertension      Past Surgical History:   Procedure Laterality Date    APPENDECTOMY      CAROTID STENT PLACEMENT      CHOLECYSTECTOMY      COLONOSCOPY      CYSTO/URETERO/PYELOSCOPY, CALCULUS TX N/A 5/8/2019    CYSTO, LEFT URETEROSCOPY, URETERORENOSCOPY, LASER LITOHTRIPSY, BASKET RETRIEVAL OF STONE FRAGMENTS, LEFT URETERAL STENT performed by Lala Parekh MD at [address], VAGINAL      KIDNEY STONE SURGERY      OVARY REMOVAL       Family History   Problem Relation Age of Onset    Cancer Mother     Heart Disease Father     Cancer Sister     Cancer Brother     Cancer Sister     Cancer Sister     Cancer Sister     Cancer Sister     Cancer Sister     Cancer Sister      Social History     Tobacco Use    Smoking status: Never Smoker    Smokeless tobacco: Never Used   Substance Use Topics    Alcohol use: Not Currently     Current Outpatient Medications   Medication Sig Dispense Refill    nitroGLYCERIN (NITROSTAT) 0.4 MG SL tablet Place 1 tablet under the tongue every 5 minutes as needed for Chest pain (Maximum 3 doses) 25 tablet 1    isosorbide mononitrate (IMDUR) 30 MG extended release tablet Take 2 tablets by mouth 2 times daily 120 tablet 5    dilTIAZem (CARDIZEM) 60 MG tablet Take 1 tablet by mouth 2 times daily 180 tablet 0    vitamin E 400 UNIT capsule Take 400 Units by mouth daily      Biotin 10 MG CAPS Take by mouth      spironolactone (ALDACTONE) 25 MG tablet Take 1 tablet by mouth daily 30 tablet 3    furosemide (LASIX) 40 MG tablet Take 1 tablet by mouth 2 times daily 60 tablet 3    clopidogrel (PLAVIX) 75 MG tablet Take 1 tablet by mouth daily 90 tablet 3    metoprolol tartrate (LOPRESSOR) 25 MG tablet Take 1 tablet by mouth 2 times daily 180 tablet 0    potassium chloride (KLOR-CON 10) 10 MEQ extended release tablet Take 10 mEq by mouth 2 times daily       simvastatin (ZOCOR) 10 MG tablet Take 10 mg by mouth nightly       No current facility-administered medications for this visit.       Allergies   Allergen Reactions    Codeine Other (See Comments)     Breathing difficulties       SUBJECTIVE:   Review of Systems   Constitutional: Positive for fatigue (improved). Negative for activity change and appetite change. Respiratory: Positive for shortness of breath (HORTON improved). Negative for cough. Cardiovascular: Positive for palpitations (improved). Negative for chest pain and leg swelling. Gastrointestinal: Negative for abdominal distention. Neurological: Negative for weakness, light-headedness and headaches. Hematological: Negative for adenopathy. Psychiatric/Behavioral: Negative for sleep disturbance. OBJECTIVE:   Today's Vitals:  /60   Pulse 72   Ht 5' 3\" (1.6 m)   Wt 177 lb 3.2 oz (80.4 kg)   SpO2 95%   BMI 31.39 kg/m²     Physical Exam  Vitals signs reviewed. Constitutional:       General: She is not in acute distress. Appearance: Normal appearance. She is well-developed. She is not diaphoretic. HENT:      Head: Normocephalic and atraumatic. Eyes:      Conjunctiva/sclera: Conjunctivae normal.   Neck:      Musculoskeletal: Normal range of motion and neck supple. Comments: No JVD  Cardiovascular:      Rate and Rhythm: Normal rate and regular rhythm. Heart sounds: Normal heart sounds. No murmur. Pulmonary:      Effort: Pulmonary effort is normal. No respiratory distress. Breath sounds: Normal breath sounds. No wheezing or rales. Abdominal:      General: Bowel sounds are normal. There is no distension. Palpations: Abdomen is soft. Tenderness: There is no abdominal tenderness. Musculoskeletal: Normal range of motion. Right lower leg: No edema. Left lower leg: No edema. Skin:     General: Skin is warm and dry. Capillary Refill: Capillary refill takes less than 2 seconds. Neurological:      Mental Status: She is alert and oriented to person, place, and time.       Coordination: Coordination normal.   Psychiatric:         Behavior: Behavior normal.         Wt Readings from Last 3 Encounters:   12/10/20 177 lb 3.2 oz (80.4 kg)   11/19/20 182 lb 6.4 oz (82.7 kg)   09/14/20 190 lb 3.2 oz (86.3 kg)     BP Readings from Last 3 Encounters:   12/10/20 110/60   11/19/20 124/72   09/14/20 103/60     Pulse Readings from Last 3 Encounters:   12/10/20 72   11/19/20 76   09/14/20 52     Body mass index is 31.39 kg/m². ECHO:    Summary   Ejection fraction was estimated at 50-55%.  E/A flow reversal noted. Suggestive of diastolic.   Ascending aorta = 3.6 cm. Alphonsa Looney size is within normal limits with normal respiratory phasic changes.      Signature      ----------------------------------------------------------------   Electronically signed by Ariel Hart MD (Interpreting   [id number]) on 06/22/2020 at 04:15 PM   ----------------------------------------------------------------      Findings      Mitral Valve   The mitral valve structure was normal with normal leaflet separation.   DOPPLER: The transmitral velocity was within the normal range with no   evidence for mitral stenosis. There was no evidence of mitral   regurgitation.      Aortic Valve   The aortic valve was trileaflet with normal thickness and cuspal   separation. DOPPLER: Transaortic velocity was within the normal range with   no evidence of aortic stenosis. There was mild aortic regurgitation.      Tricuspid Valve   The tricuspid valve structure was normal with normal leaflet separation.   DOPPLER: There was no evidence of tricuspid stenosis. There was no   evidence of tricuspid regurgitation.      Pulmonic Valve   The pulmonic valve leaflets were not well seen.  DOPPLER: The transpulmonic   velocity was within the normal range with no evidence for regurgitation.      Left Atrium   Left atrial size was normal.      Left Ventricle   Normal left ventricular size and systolic function.   There were no regional wall motion abnormalities.   Wall thickness was within normal limits.   Ejection fraction was estimated at 50-55%.  E/A flow reversal noted. Suggestive of diastolic dysfunction.      Right Atrium   Right atrial size was normal.      Right Ventricle   The right ventricular size was normal with normal systolic function and   wall thickness.      Pericardial Effusion   The pericardium was normal in appearance with no evidence of a pericardial   effusion.      Pleural Effusion   No evidence of pleural effusion.      Aorta / Great Vessels   -Ascending aorta = 3.6 cm.   -IVC size is within normal limits with normal respiratory phasic changes     CATH/STRESS:   SUMMARY:  PDA with 70% to 80% stenosis; elevated right heart cath  pressures and elevated EDP suggestive of volume overload with preserved  cardiac output.     RIGHT HEART CATHETERIZATION:  RA 16, RV 41/20, PA 41/23 with a mean of  29, wedge pressure 20, PA saturation is 74%, cardiac output is 4,  cardiac index 2.1.     PLAN:  1.  Bedrest.  2.  Optimal medical therapy. 3.  Risk factor management. 4.  Routine access site care. 5.  Aggressive diuresis, increase Lasix.  Follow up with heart failure  clinic.  Avoid salt and reduce fluid intake.  We will reassess symptoms  at that time. Dayanara Mejia has a negative ischemic evaluation.  These findings  are likely more incidental rather than cause of her cardiac  decompensation.  Majority of her shortness of breath appears to be  volume and pressure related.  If she has symptoms despite normalizing  her volume status and her blood pressure, then I would consider  intervention of the PDA.     All the above was explained to the patient and the patient's family.    They were agreeable and amenable to the plan.        Anushka Barragan MD     D: 08/16/2020 9:51:16        HOLTER  SUMMARY:  No significant abnormalities explained the palpitations, very  occasional PACs and PVCs.     Anushka Barragan MD     D: 12/03/2020 5:54:16          Results reviewed:  BNP: No results found for: BNP  CBC:   Lab Results   Component Value Date    WBC 4.6 08/13/2020    RBC 3.86 08/13/2020    HGB 13.4 08/13/2020    HCT 40.6 08/13/2020     08/13/2020     CMP:    Lab Results   Component Value Date     11/19/2020    K 4.0 11/19/2020    K 3.8 08/13/2020    K 3.9 08/13/2020     11/19/2020    CO2 28 11/19/2020    BUN 29 11/19/2020    CREATININE 1.0 11/19/2020    LABGLOM 54 11/19/2020    GLUCOSE 115 11/19/2020    CALCIUM 10.2 11/19/2020     Hepatic Function Panel:    Lab Results   Component Value Date    ALKPHOS 53 08/13/2020    ALT 8 08/13/2020    AST 12 08/13/2020    PROT 6.0 08/13/2020    BILITOT 0.4 08/13/2020    LABALBU 3.8 08/13/2020     Magnesium:    Lab Results   Component Value Date    MG 2.0 11/19/2020     PT/INR:    Lab Results   Component Value Date    INR 1.09 08/13/2020     Lipids:    Lab Results   Component Value Date    TRIG 124 08/13/2020    HDL 51 08/13/2020    LDLCALC 62 08/13/2020       ASSESSMENT AND PLAN:   The patient's condition/symptoms are Stable: No clinical evidence of fluid overload today. Continue current medical regimen without changes at present time. Diagnosis Orders   1. CHF (congestive heart failure), NYHA class II, chronic, diastolic (HCC)  Basic Metabolic Panel   2. Coronary artery disease involving native coronary artery of native heart without angina pectoris     3. Essential hypertension     4. HORTON (dyspnea on exertion)       Continue:  GDMT:              ACE/ARB/ARNI - no              BB - Lopressor 25 BID              Diuretic - Lasix 40 BID, Kcl 10 BID              Vasodilator - Imdur 60 BID               MRA - Aldactone 25/day  Digoxin - no              Antiarrhythmic - Cardizem 60 BID  Continue Current medications  Stable - appears Euvolemic   CP, Palpitations improved - she prefers to hold on LHC   Continue SL Ntg as needed. Continue salt/fluid adherence  Daily wts. Increase activity as able. BMP in 4 weeks.    F/U w/ Andres Westfall in Jan   F/U in clinic in March    · Daily weights  · Fluid restriction of 2 Liters per day  · Limit sodium in diet to around 8783-6872 mg/day  · Monitor BP  · Activity as tolerated     Patient was instructed to call the [address] for any changes in symptoms as noted in AVS.      Return in about 3 months (around 3/10/2021). or sooner if needed     Patient given educational materials - see patient instructions. We discussed the importance of weighing oneself and recording daily. We also discussed the importance of a low sodium diet, higher sodium foods to avoid and better low sodium food options. Patient verbalizes understanding of plan of care using teach back method, and is agreeable to the treatment plan.        Electronically signed by NASIM Baldwin CNP on 12/10/2020 at 11:36 AM

## 2020-12-10 NOTE — PATIENT INSTRUCTIONS
You may receive a survey regarding the care you received during your visit. Your input is valuable to us. We encourage you to complete and return your survey. We hope you will choose us in the future for your healthcare needs.     Continue:  · Continue current medications  · Daily weights and record  · Fluid restriction of 2 Liters per day  · Limit sodium in diet to around 5188-0824 mg/day  · Monitor BP  · Activity as tolerated     Call the Heart Failure Clinic for any of the following symptoms: [phone]   Weight gain of 2-3 pounds in 1 day or 5 pounds in 1 week   Increased shortness of breath   Shortness of breath while laying down   Cough   Chest pain   Swelling in feet, ankles or legs   Tenderness or bloating in the abdomen   Fatigue    Decreased appetite or nausea    Confusion       Get blood work in mid [place]

## 2020-12-14 ENCOUNTER — TELEPHONE (OUTPATIENT)
Dept: CARDIOLOGY CLINIC | Age: 75
End: 2020-12-14

## 2020-12-14 NOTE — TELEPHONE ENCOUNTER
MD Veronique Mendez, APRN - CNP; Oh Chance, RN             If she is dry has possible, then this is the same pain I cathed her for previously but since there is no other cause, I just reviewed the prior cath, PDA is tight, so I would proceed with PCI--just let Heather Sheth know if you want to proceed forward or if she wants to come see me first, either way is fine with me      Dora Locke I need to schedule anything for patient? Just let me know. Thanks!

## 2020-12-14 NOTE — TELEPHONE ENCOUNTER
No - I saw her in office last Thursday. Her chest pain and palpitations is much improved. Will hold off on cath.  Thanks

## 2021-01-12 LAB
BUN BLDV-MCNC: 13 MG/DL
CALCIUM SERPL-MCNC: 8.9 MG/DL
CHLORIDE BLD-SCNC: 107 MMOL/L
CO2: 29 MMOL/L
CREAT SERPL-MCNC: 1.17 MG/DL
GFR CALCULATED: 45
GLUCOSE BLD-MCNC: 88 MG/DL
POTASSIUM SERPL-SCNC: 4.3 MMOL/L
SODIUM BLD-SCNC: 145 MMOL/L

## 2021-01-14 ENCOUNTER — OFFICE VISIT (OUTPATIENT)
Dept: CARDIOLOGY CLINIC | Age: 76
End: 2021-01-14
Payer: MEDICARE

## 2021-01-14 VITALS
HEART RATE: 80 BPM | HEIGHT: 63 IN | DIASTOLIC BLOOD PRESSURE: 74 MMHG | SYSTOLIC BLOOD PRESSURE: 130 MMHG | WEIGHT: 178.6 LBS | BODY MASS INDEX: 31.64 KG/M2

## 2021-01-14 DIAGNOSIS — R07.89 CHEST PRESSURE: Primary | ICD-10-CM

## 2021-01-14 DIAGNOSIS — I25.10 CORONARY ARTERY DISEASE INVOLVING NATIVE CORONARY ARTERY OF NATIVE HEART WITHOUT ANGINA PECTORIS: ICD-10-CM

## 2021-01-14 PROCEDURE — G8427 DOCREV CUR MEDS BY ELIG CLIN: HCPCS | Performed by: INTERNAL MEDICINE

## 2021-01-14 PROCEDURE — 1036F TOBACCO NON-USER: CPT | Performed by: INTERNAL MEDICINE

## 2021-01-14 PROCEDURE — 1123F ACP DISCUSS/DSCN MKR DOCD: CPT | Performed by: INTERNAL MEDICINE

## 2021-01-14 PROCEDURE — 1090F PRES/ABSN URINE INCON ASSESS: CPT | Performed by: INTERNAL MEDICINE

## 2021-01-14 PROCEDURE — G8400 PT W/DXA NO RESULTS DOC: HCPCS | Performed by: INTERNAL MEDICINE

## 2021-01-14 PROCEDURE — 99213 OFFICE O/P EST LOW 20 MIN: CPT | Performed by: INTERNAL MEDICINE

## 2021-01-14 PROCEDURE — 4040F PNEUMOC VAC/ADMIN/RCVD: CPT | Performed by: INTERNAL MEDICINE

## 2021-01-14 PROCEDURE — G8417 CALC BMI ABV UP PARAM F/U: HCPCS | Performed by: INTERNAL MEDICINE

## 2021-01-14 PROCEDURE — 3017F COLORECTAL CA SCREEN DOC REV: CPT | Performed by: INTERNAL MEDICINE

## 2021-01-14 PROCEDURE — G8484 FLU IMMUNIZE NO ADMIN: HCPCS | Performed by: INTERNAL MEDICINE

## 2021-01-14 RX ORDER — SPIRONOLACTONE 25 MG/1
25 TABLET ORAL DAILY
Qty: 90 TABLET | Refills: 3 | Status: SHIPPED | OUTPATIENT
Start: 2021-01-14 | End: 2021-09-13 | Stop reason: SDUPTHER

## 2021-01-14 RX ORDER — FUROSEMIDE 40 MG/1
40 TABLET ORAL 2 TIMES DAILY
Qty: 180 TABLET | Refills: 3 | Status: SHIPPED | OUTPATIENT
Start: 2021-01-14 | End: 2022-01-13 | Stop reason: SDUPTHER

## 2021-01-14 RX ORDER — ISOSORBIDE MONONITRATE 30 MG/1
60 TABLET, EXTENDED RELEASE ORAL 2 TIMES DAILY
Qty: 120 TABLET | Refills: 11 | Status: SHIPPED | OUTPATIENT
Start: 2021-01-14 | End: 2021-08-23 | Stop reason: SDUPTHER

## 2021-01-14 RX ORDER — DILTIAZEM HYDROCHLORIDE 60 MG/1
60 TABLET, FILM COATED ORAL 2 TIMES DAILY
Qty: 180 TABLET | Refills: 3 | Status: SHIPPED | OUTPATIENT
Start: 2021-01-14 | End: 2022-01-13

## 2021-01-14 NOTE — PROGRESS NOTES
1945    Chief Complaint   Patient presents with    3 Month Follow-Up    Congestive Heart Failure     CC/Opening Statement:    Mrs. Aryan Marroquin is a 77year old female with a history of asthma, atelectasis, hypertension, obesity, partial thyroidectomy, unstable angina, and nephrolithiasis being seen for a three month follow-up. HPI:     The patient states that her SOB has significantly improved since treatment for her diastolic heart failure. Has lost 75 pounds with Lasix and salt restriction. States that she has been getting regular exercise. However, she affirms ongoing fatigue and occasional palpitations with tachy and bradycardia. Tachycardia is usually triggered by physical activity and is associated with chest pain, which is chronic. Chest pain is relieved with rest and nitroglycerin. Affirms substantial lower extremity edema which has improved since treatment for diastolic CHF and use of compressive stockings. Affirms intermittent episodes of slight hypotension with systolic pressures of around 90. Otherwise, denies chest pain and shortness of breath. No other specific complaints. ROS: Negative except as above.      Past Medical History:   Diagnosis Date    CAD (coronary artery disease)     PCI     Chronic kidney disease     stones    Hyperlipidemia     Hypertension        Past Surgical History:   Procedure Laterality Date    APPENDECTOMY      CAROTID STENT PLACEMENT      CHOLECYSTECTOMY      COLONOSCOPY      CYSTO/URETERO/PYELOSCOPY, CALCULUS TX N/A 5/8/2019    CYSTO, LEFT URETEROSCOPY, URETERORENOSCOPY, LASER LITOHTRIPSY, BASKET RETRIEVAL OF STONE FRAGMENTS, LEFT URETERAL STENT performed by Connie David MD at [facility], VAGINAL      KIDNEY STONE SURGERY      OVARY REMOVAL         Current Outpatient Medications   Medication Sig Dispense Refill    nitroGLYCERIN (NITROSTAT) 0.4 MG SL tablet Place 1 tablet under the tongue every 5 minutes as needed for Chest pain (Maximum 3 doses) 25 tablet 1    isosorbide mononitrate (IMDUR) 30 MG extended release tablet Take 2 tablets by mouth 2 times daily 120 tablet 5    dilTIAZem (CARDIZEM) 60 MG tablet Take 1 tablet by mouth 2 times daily 180 tablet 0    vitamin E 400 UNIT capsule Take 400 Units by mouth daily      Biotin 10 MG CAPS Take by mouth      spironolactone (ALDACTONE) 25 MG tablet Take 1 tablet by mouth daily 30 tablet 3    furosemide (LASIX) 40 MG tablet Take 1 tablet by mouth 2 times daily 60 tablet 3    clopidogrel (PLAVIX) 75 MG tablet Take 1 tablet by mouth daily 90 tablet 3    metoprolol tartrate (LOPRESSOR) 25 MG tablet Take 1 tablet by mouth 2 times daily 180 tablet 0    potassium chloride (KLOR-CON 10) 10 MEQ extended release tablet Take 10 mEq by mouth 2 times daily       simvastatin (ZOCOR) 10 MG tablet Take 10 mg by mouth nightly       No current facility-administered medications for this visit. Allergies   Allergen Reactions    Codeine Other (See Comments)     Breathing difficulties       Social History     Socioeconomic History    Marital status:       Spouse name: Alicia Hernandez Number of children: 2    Years of education: Not on file    Highest education level: Not on file   Occupational History    Not on file   Social Needs    Financial resource strain: Not on file    Food insecurity     Worry: Not on file     Inability: Not on file    Transportation needs     Medical: Not on file     Non-medical: Not on file   Tobacco Use    Smoking status: Never Smoker    Smokeless tobacco: Never Used   Substance and Sexual Activity    Alcohol use: Not Currently    Drug use: Never    Sexual activity: Not Currently   Lifestyle    Physical activity     Days per week: Not on file     Minutes per session: Not on file    Stress: Not on file   Relationships    Social connections     Talks on phone: Not on file     Gets together: Not on file     Attends [employer] service: Not on file     Active member of club or organization: Not on file     Attends meetings of clubs or organizations: Not on file     Relationship status: Not on file    Intimate partner violence     Fear of current or ex partner: Not on file     Emotionally abused: Not on file     Physically abused: Not on file     Forced sexual activity: Not on file   Other Topics Concern    Not on file   Social History Narrative    Not on file       Family History   Problem Relation Age of Onset    Cancer Mother     Heart Disease Father     Cancer Sister    Kyle Hackett Sister        Blood pressure 130/74, pulse 80, height 5' 3\" (1.6 m), weight 178 lb 9.6 oz (81 kg), not currently breastfeeding. Physical Examination: General appearance -alert, well appearing, and in no distress  Mental status - alert, oriented to person, place, and time  Head - atraumatic, normocephalic  Mouth - oral pharynx clear, mucous membranes moist  Neck - supple, no significant adenopathy  Chest - clear to auscultation, no wheezes, rales or rhonchi, symmetric air entry  Heart - normal rate, regular rhythm, normal S1, S2, no murmurs, rubs, clicks or gallops  Abdomen -soft, nontender, nondistended  Extremities - +2 lower extremity edema   Skin - warm and dry    Assessment and Plan:    1. Congestive Heart Failure with Preserved Ejection Fraction. Recent echo showed ejection fraction of 50-55%. Recent stress test unremarkable. Event monitor showed occasional PACs and PVCs but no sustained arrhythmia. Recent heart cath showed 70-80% stenosis of the PDA and signs of volume overload. Maintain on Lasix and and follow-up in three months. If stable or worsening symptoms at next visit, consider stenting the PDA. 2. Angina, unspecified. Cardiac history and exertional tachycardia and chest pain. Maintain prn nitro and scheduled Imdur. The patient was offered Ranolazine, which she declined. If worsening symptoms, add Ranolazine. If this is not sufficient for control, stent PDA. Otherwise as above. Glo Coreas MD, MPH, [facility]  Supervised by Dr. Telma Hoover    Attending Supervising Physician's [address] [place] [address] performed a history and physical examination on the patient and discussed the management with the resident physician. I reviewed and agree with the findings and plan as documented in the resident's note except for as noted below. 76 yo F hx of PDA stenosis, preserved EF, grade 1 DD who has very intermittent, sometimes exertional anginal symptoms. She is taking all meds. She does not feel that the discomfort bothers her. Discussed increasing meds, adding Ranexa, or proceeding with heart cath. She prefers to continue current medical therapy. Will follow up if symptoms worsening. Discussed diet/exercise/BP/weight loss/health lifestyle choices/lipids; the patient understands the goals and will try to comply.       Electronically signed by Kaylee Ku MD on 1/14/21 at 2:21 PM EST

## 2021-03-04 ENCOUNTER — OFFICE VISIT (OUTPATIENT)
Dept: CARDIOLOGY CLINIC | Age: 76
End: 2021-03-04
Payer: MEDICARE

## 2021-03-04 VITALS
DIASTOLIC BLOOD PRESSURE: 60 MMHG | SYSTOLIC BLOOD PRESSURE: 110 MMHG | HEIGHT: 63 IN | BODY MASS INDEX: 31.22 KG/M2 | OXYGEN SATURATION: 96 % | WEIGHT: 176.2 LBS | HEART RATE: 59 BPM

## 2021-03-04 DIAGNOSIS — I25.10 CORONARY ARTERY DISEASE INVOLVING NATIVE CORONARY ARTERY OF NATIVE HEART WITHOUT ANGINA PECTORIS: ICD-10-CM

## 2021-03-04 DIAGNOSIS — R00.2 PALPITATIONS: ICD-10-CM

## 2021-03-04 DIAGNOSIS — I10 ESSENTIAL HYPERTENSION: ICD-10-CM

## 2021-03-04 DIAGNOSIS — R06.09 DOE (DYSPNEA ON EXERTION): ICD-10-CM

## 2021-03-04 DIAGNOSIS — I50.32 CHF (CONGESTIVE HEART FAILURE), NYHA CLASS II, CHRONIC, DIASTOLIC (HCC): Primary | ICD-10-CM

## 2021-03-04 PROBLEM — E89.0 STATUS POST PARTIAL THYROIDECTOMY: Status: ACTIVE | Noted: 2017-07-10

## 2021-03-04 PROCEDURE — G8484 FLU IMMUNIZE NO ADMIN: HCPCS | Performed by: NURSE PRACTITIONER

## 2021-03-04 PROCEDURE — 1036F TOBACCO NON-USER: CPT | Performed by: NURSE PRACTITIONER

## 2021-03-04 PROCEDURE — 3017F COLORECTAL CA SCREEN DOC REV: CPT | Performed by: NURSE PRACTITIONER

## 2021-03-04 PROCEDURE — 1090F PRES/ABSN URINE INCON ASSESS: CPT | Performed by: NURSE PRACTITIONER

## 2021-03-04 PROCEDURE — G8417 CALC BMI ABV UP PARAM F/U: HCPCS | Performed by: NURSE PRACTITIONER

## 2021-03-04 PROCEDURE — 4040F PNEUMOC VAC/ADMIN/RCVD: CPT | Performed by: NURSE PRACTITIONER

## 2021-03-04 PROCEDURE — G8427 DOCREV CUR MEDS BY ELIG CLIN: HCPCS | Performed by: NURSE PRACTITIONER

## 2021-03-04 PROCEDURE — G8400 PT W/DXA NO RESULTS DOC: HCPCS | Performed by: NURSE PRACTITIONER

## 2021-03-04 PROCEDURE — 1123F ACP DISCUSS/DSCN MKR DOCD: CPT | Performed by: NURSE PRACTITIONER

## 2021-03-04 PROCEDURE — 99214 OFFICE O/P EST MOD 30 MIN: CPT | Performed by: NURSE PRACTITIONER

## 2021-03-04 RX ORDER — POTASSIUM CHLORIDE 750 MG/1
10 TABLET, FILM COATED, EXTENDED RELEASE ORAL 2 TIMES DAILY
Qty: 180 TABLET | Refills: 5 | Status: SHIPPED | OUTPATIENT
Start: 2021-03-04 | End: 2022-05-19

## 2021-03-04 ASSESSMENT — ENCOUNTER SYMPTOMS
SHORTNESS OF BREATH: 1
COUGH: 0
ABDOMINAL DISTENTION: 0

## 2021-03-04 NOTE — PATIENT INSTRUCTIONS
You may receive a survey regarding the care you received during your visit. Your input is valuable to us. We encourage you to complete and return your survey. We hope you will choose us in the future for your healthcare needs. Continue:  · Continue current medications  · Daily weights and record  · Fluid restriction of 2 Liters per day  · Limit sodium in diet to around 4731-0044 mg/day  · Monitor BP  · Activity as tolerated     Call the Heart Failure Clinic for any of the following symptoms: [phone]   Weight gain of 2-3 pounds in 1 day or 5 pounds in 1 week   Increased shortness of breath   Shortness of breath while laying down   Cough   Chest pain   Swelling in feet, ankles or legs   Tenderness or bloating in the abdomen   Fatigue    Decreased appetite or nausea    Confusion          For constipation = Stool softener = Colace capsules. Miralax as needed (Polythylene glycol is generic name).

## 2021-03-08 LAB
BASOPHILS ABSOLUTE: 0 /ΜL
BASOPHILS RELATIVE PERCENT: 0.2 %
BUN BLDV-MCNC: 27 MG/DL
CALCIUM SERPL-MCNC: 9.2 MG/DL
CHLORIDE BLD-SCNC: 99 MMOL/L
CO2: 34 MMOL/L
CREAT SERPL-MCNC: 1.1 MG/DL
EOSINOPHILS ABSOLUTE: 0.1 /ΜL
EOSINOPHILS RELATIVE PERCENT: 0.9 %
GFR CALCULATED: 49
GLUCOSE BLD-MCNC: 142 MG/DL
HCT VFR BLD CALC: 43.9 % (ref 36–46)
HEMOGLOBIN: 14.3 G/DL (ref 12–16)
LYMPHOCYTES ABSOLUTE: 2.6 /ΜL
LYMPHOCYTES RELATIVE PERCENT: 43.5 %
MCH RBC QN AUTO: 32.9 PG
MCHC RBC AUTO-ENTMCNC: 32.6 G/DL
MCV RBC AUTO: 101.2 FL
MONOCYTES ABSOLUTE: 0.6 /ΜL
MONOCYTES RELATIVE PERCENT: 9.4 %
NEUTROPHILS ABSOLUTE: 2.7 /ΜL
NEUTROPHILS RELATIVE PERCENT: 45.8 %
PLATELET # BLD: 199 K/ΜL
PMV BLD AUTO: 9.4 FL
POTASSIUM SERPL-SCNC: 3.9 MMOL/L
RBC # BLD: 4.34 10^6/ΜL
SODIUM BLD-SCNC: 140 MMOL/L
TSH SERPL DL<=0.05 MIU/L-ACNC: 2.58 UIU/ML
WBC # BLD: 5.9 10^3/ML

## 2021-03-09 ENCOUNTER — TELEPHONE (OUTPATIENT)
Dept: CARDIOLOGY CLINIC | Age: 76
End: 2021-03-09

## 2021-03-09 NOTE — TELEPHONE ENCOUNTER
----- Message from NASIM Mayorga CNP sent at 3/8/2021  9:41 PM EST -----  Labs look good. Stable. No thyroid level noted. Not sure if missed.

## 2021-06-07 ENCOUNTER — HOSPITAL ENCOUNTER (OUTPATIENT)
Age: 76
Discharge: HOME | End: 2021-06-07
Payer: MEDICARE

## 2021-06-07 PROCEDURE — 87086 URINE CULTURE/COLONY COUNT: CPT

## 2021-06-07 PROCEDURE — 87186 SC STD MICRODIL/AGAR DIL: CPT

## 2021-06-07 PROCEDURE — 87077 CULTURE AEROBIC IDENTIFY: CPT

## 2021-06-09 LAB
ORGANISM: ABNORMAL
URINE CULTURE, ROUTINE: ABNORMAL

## 2021-06-17 ENCOUNTER — HOSPITAL ENCOUNTER (OUTPATIENT)
Dept: CT IMAGING | Age: 76
Discharge: HOME | End: 2021-06-17
Payer: MEDICARE

## 2021-06-17 DIAGNOSIS — R31.29 MICROSCOPIC HEMATURIA: ICD-10-CM

## 2021-06-17 DIAGNOSIS — N39.0 RECURRENT UTI: ICD-10-CM

## 2021-06-17 DIAGNOSIS — Z87.442 HISTORY OF NEPHROLITHIASIS: ICD-10-CM

## 2021-06-17 PROCEDURE — 74176 CT ABD & PELVIS W/O CONTRAST: CPT

## 2021-07-01 ENCOUNTER — TELEPHONE (OUTPATIENT)
Dept: CARDIOLOGY CLINIC | Age: 76
End: 2021-07-01

## 2021-07-01 NOTE — TELEPHONE ENCOUNTER
Pre op Risk Assessment    Procedure Cystoscopy w/Lithotripsy  Physician Dr. Keith Cameron  Date of surgery/procedure TBD    Last OV 1-14-21  Last Stress 6-22-20  Last Echo 6-22-20  Last Cath 8-13-20  Last Stent None in Epic  Is patient on blood thinners Plavix  Hold Meds/how many days 7 days prior    Fax: [phone]

## 2021-08-23 RX ORDER — ISOSORBIDE MONONITRATE 30 MG/1
60 TABLET, EXTENDED RELEASE ORAL 2 TIMES DAILY
Qty: 180 TABLET | Refills: 1 | Status: SHIPPED | OUTPATIENT
Start: 2021-08-23 | End: 2022-01-13 | Stop reason: SDUPTHER

## 2021-09-07 ENCOUNTER — TELEPHONE (OUTPATIENT)
Dept: CARDIOLOGY CLINIC | Age: 76
End: 2021-09-07

## 2021-09-07 NOTE — LETTER
[facility] CHF [facility]  [facility]  [place] [address]  Phone: [phone]  Fax: Brenda Ford 60., APRN - CNP        September 7, 3417    [address] [id number]      Dear Ronaldo Miguel: We are sorry that you missed your appointment with Fabiola Turner CNP at the [address] [place] [address] on 9/7/21. We ask that you please call 24 hours in advance if you are unable to make your appointment so that we can give that time to another patient in need. Routine visits are best in preventing hospitalizations for Heart Failure and are important in managing your care. We are unable to continue to treat and manage your care if you are not routinely seen in our clinic. Please call the office to let us know your plans for treatment and to reschedule your appointment by 10/7/21. Please continue to monitor for signs of worsening heart failure:   · Weight gain (3#/day or 5#/week) - WEIGH daily   · Shortness of breath  · Lower leg edema  · Increased cough, fatigue, chest pain, or trouble sleeping. If you have any questions or concerns, please don't hesitate to call.     Sincerely,        NASIM Jama CNP

## 2021-09-07 NOTE — TELEPHONE ENCOUNTER
Patient NS to chf clinic appt. Called to patient, no answer. [facility]    Letter mailed.

## 2021-09-13 ENCOUNTER — OFFICE VISIT (OUTPATIENT)
Dept: CARDIOLOGY CLINIC | Age: 76
End: 2021-09-13
Payer: MEDICARE

## 2021-09-13 VITALS
SYSTOLIC BLOOD PRESSURE: 108 MMHG | BODY MASS INDEX: 30.39 KG/M2 | OXYGEN SATURATION: 94 % | HEART RATE: 72 BPM | DIASTOLIC BLOOD PRESSURE: 60 MMHG | WEIGHT: 178 LBS | HEIGHT: 64 IN

## 2021-09-13 DIAGNOSIS — R53.83 FATIGUE, UNSPECIFIED TYPE: ICD-10-CM

## 2021-09-13 DIAGNOSIS — I50.32 CHF (CONGESTIVE HEART FAILURE), NYHA CLASS II, CHRONIC, DIASTOLIC (HCC): Primary | ICD-10-CM

## 2021-09-13 DIAGNOSIS — I25.10 CORONARY ARTERY DISEASE INVOLVING NATIVE CORONARY ARTERY OF NATIVE HEART WITHOUT ANGINA PECTORIS: ICD-10-CM

## 2021-09-13 DIAGNOSIS — R06.09 DOE (DYSPNEA ON EXERTION): ICD-10-CM

## 2021-09-13 DIAGNOSIS — I10 ESSENTIAL HYPERTENSION: ICD-10-CM

## 2021-09-13 LAB
ANION GAP SERPL CALCULATED.3IONS-SCNC: 12 MEQ/L (ref 8–16)
BUN BLDV-MCNC: 29 MG/DL (ref 7–22)
CALCIUM SERPL-MCNC: 9.8 MG/DL (ref 8.5–10.5)
CHLORIDE BLD-SCNC: 100 MEQ/L (ref 98–111)
CO2: 27 MEQ/L (ref 23–33)
CREAT SERPL-MCNC: 1.1 MG/DL (ref 0.4–1.2)
GFR SERPL CREATININE-BSD FRML MDRD: 48 ML/MIN/1.73M2
GLUCOSE BLD-MCNC: 128 MG/DL (ref 70–108)
POTASSIUM SERPL-SCNC: 4.5 MEQ/L (ref 3.5–5.2)
SODIUM BLD-SCNC: 139 MEQ/L (ref 135–145)

## 2021-09-13 PROCEDURE — G8400 PT W/DXA NO RESULTS DOC: HCPCS | Performed by: NURSE PRACTITIONER

## 2021-09-13 PROCEDURE — 99214 OFFICE O/P EST MOD 30 MIN: CPT | Performed by: NURSE PRACTITIONER

## 2021-09-13 PROCEDURE — 1036F TOBACCO NON-USER: CPT | Performed by: NURSE PRACTITIONER

## 2021-09-13 PROCEDURE — 1090F PRES/ABSN URINE INCON ASSESS: CPT | Performed by: NURSE PRACTITIONER

## 2021-09-13 PROCEDURE — G8427 DOCREV CUR MEDS BY ELIG CLIN: HCPCS | Performed by: NURSE PRACTITIONER

## 2021-09-13 PROCEDURE — 36415 COLL VENOUS BLD VENIPUNCTURE: CPT | Performed by: NURSE PRACTITIONER

## 2021-09-13 PROCEDURE — 3017F COLORECTAL CA SCREEN DOC REV: CPT | Performed by: NURSE PRACTITIONER

## 2021-09-13 PROCEDURE — 4040F PNEUMOC VAC/ADMIN/RCVD: CPT | Performed by: NURSE PRACTITIONER

## 2021-09-13 PROCEDURE — G8417 CALC BMI ABV UP PARAM F/U: HCPCS | Performed by: NURSE PRACTITIONER

## 2021-09-13 PROCEDURE — 1123F ACP DISCUSS/DSCN MKR DOCD: CPT | Performed by: NURSE PRACTITIONER

## 2021-09-13 RX ORDER — CLOPIDOGREL BISULFATE 75 MG/1
75 TABLET ORAL DAILY
Qty: 90 TABLET | Refills: 3 | Status: SHIPPED | OUTPATIENT
Start: 2021-09-13 | End: 2022-09-15 | Stop reason: SDUPTHER

## 2021-09-13 RX ORDER — SIMVASTATIN 10 MG
10 TABLET ORAL NIGHTLY
Qty: 90 TABLET | Refills: 3 | Status: SHIPPED | OUTPATIENT
Start: 2021-09-13 | End: 2022-09-13

## 2021-09-13 RX ORDER — SPIRONOLACTONE 25 MG/1
25 TABLET ORAL DAILY
Qty: 90 TABLET | Refills: 3 | Status: SHIPPED | OUTPATIENT
Start: 2021-09-13 | End: 2022-09-15 | Stop reason: SDUPTHER

## 2021-09-13 ASSESSMENT — ENCOUNTER SYMPTOMS
SHORTNESS OF BREATH: 1
ABDOMINAL DISTENTION: 0
COUGH: 0

## 2021-09-13 NOTE — PROGRESS NOTES
Venipuncture obtained from right hand. Patient tolerated procedure without complications or complaints.

## 2021-09-13 NOTE — PATIENT INSTRUCTIONS
You may receive a survey regarding the care you received during your visit. Your input is valuable to us. We encourage you to complete and return your survey. We hope you will choose us in the future for your healthcare needs.     Continue:  · Continue current medications  · Daily weights and record  · Fluid restriction of 2 Liters per day  · Limit sodium in diet to around 5700-4773 mg/day  · Monitor BP  · Activity as tolerated     Call the [facility] for any of the following symptoms: [phone]   Weight gain of 2-3 pounds in 1 day or 5 pounds in 1 week   Increased shortness of breath   Shortness of breath while laying down   Cough   Chest pain   Swelling in feet, ankles or legs   Tenderness or bloating in the abdomen   Fatigue    Decreased appetite or nausea    Confusion

## 2021-09-13 NOTE — PROGRESS NOTES
SURGERY      OVARY REMOVAL       Family History   Problem Relation Age of Onset    Cancer Mother     Heart Disease Father     Cancer Sister     Cancer Brother     Cancer Sister     Cancer Sister     Cancer Sister     Cancer Sister     Cancer Sister     Cancer Sister      Social History     Tobacco Use    Smoking status: Never Smoker    Smokeless tobacco: Never Used   Substance Use Topics    Alcohol use: Not Currently     Current Outpatient Medications   Medication Sig Dispense Refill    clopidogrel (PLAVIX) 75 MG tablet Take 1 tablet by mouth daily 90 tablet 3    simvastatin (ZOCOR) 10 MG tablet Take 1 tablet by mouth nightly 90 tablet 3    spironolactone (ALDACTONE) 25 MG tablet Take 1 tablet by mouth daily 90 tablet 3    isosorbide mononitrate (IMDUR) 30 MG extended release tablet Take 2 tablets by mouth 2 times daily 180 tablet 1    potassium chloride (KLOR-CON 10) 10 MEQ extended release tablet Take 1 tablet by mouth 2 times daily 180 tablet 5    dilTIAZem (CARDIZEM) 60 MG tablet Take 1 tablet by mouth 2 times daily 180 tablet 3    furosemide (LASIX) 40 MG tablet Take 1 tablet by mouth 2 times daily 180 tablet 3    metoprolol tartrate (LOPRESSOR) 25 MG tablet Take 1 tablet by mouth 2 times daily 180 tablet 3    nitroGLYCERIN (NITROSTAT) 0.4 MG SL tablet Place 1 tablet under the tongue every 5 minutes as needed for Chest pain (Maximum 3 doses) 25 tablet 1    vitamin E 400 UNIT capsule Take 400 Units by mouth daily      Biotin 10 MG CAPS Take by mouth       No current facility-administered medications for this visit. Allergies   Allergen Reactions    Codeine Other (See Comments)     Breathing difficulties       SUBJECTIVE:   Review of Systems   Constitutional: Positive for fatigue. Negative for activity change and appetite change. Respiratory: Positive for shortness of breath (HORTON at times). Negative for cough.     Cardiovascular: Negative for chest pain, palpitations and leg kg/m².      ECHO:    Summary   Ejection fraction was estimated at 50-55%.  E/A flow reversal noted. Suggestive of diastolic.   Ascending aorta = 3.6 cm. Caesar Geismar size is within normal limits with normal respiratory phasic changes.      Signature      ----------------------------------------------------------------   Electronically signed by Ernesto Mclean MD (Interpreting   MQUENBGNT) on 06/22/2020 at 04:15 PM   ----------------------------------------------------------------      Findings      Mitral Valve   The mitral valve structure was normal with normal leaflet separation.   DOPPLER: The transmitral velocity was within the normal range with no   evidence for mitral stenosis. There was no evidence of mitral   regurgitation.      Aortic Valve   The aortic valve was trileaflet with normal thickness and cuspal   separation. DOPPLER: Transaortic velocity was within the normal range with   no evidence of aortic stenosis. There was mild aortic regurgitation.      Tricuspid Valve   The tricuspid valve structure was normal with normal leaflet separation.   DOPPLER: There was no evidence of tricuspid stenosis. There was no   evidence of tricuspid regurgitation.      Pulmonic Valve   The pulmonic valve leaflets were not well seen. DOPPLER: The transpulmonic   velocity was within the normal range with no evidence for regurgitation.      Left Atrium   Left atrial size was normal.      Left Ventricle   Normal left ventricular size and systolic function.   There were no regional wall motion abnormalities.   Wall thickness was within normal limits.   Ejection fraction was estimated at 50-55%.  E/A flow reversal noted.  Suggestive of diastolic dysfunction.      Right Atrium   Right atrial size was normal.      Right Ventricle   The right ventricular size was normal with normal systolic function and   wall thickness.      Pericardial Effusion   The pericardium was normal in appearance with no evidence of a pericardial   effusion.      Pleural Effusion   No evidence of pleural effusion.      Aorta / Great Vessels   -Ascending aorta = 3.6 cm.   -IVC size is within normal limits with normal respiratory phasic changes     CATH/STRESS:   SUMMARY:  PDA with 70% to 80% stenosis; elevated right heart cath  pressures and elevated EDP suggestive of volume overload with preserved  cardiac output.     RIGHT HEART CATHETERIZATION:  RA 16, RV 41/20, PA 41/23 with a mean of  29, wedge pressure 20, PA saturation is 74%, cardiac output is 4,  cardiac index 2.1.     PLAN:  1.  Bedrest.  2.  Optimal medical therapy. 3.  Risk factor management. 4.  Routine access site care. 5.  Aggressive diuresis, increase Lasix.  Follow up with heart failure  clinic.  Avoid salt and reduce fluid intake.  We will reassess symptoms  at that time. Zane Mckenzie has a negative ischemic evaluation.  These findings  are likely more incidental rather than cause of her cardiac  decompensation.  Majority of her shortness of breath appears to be  volume and pressure related.  If she has symptoms despite normalizing  her volume status and her blood pressure, then I would consider  intervention of the PDA.     All the above was explained to the patient and the patient's family.    They were agreeable and amenable to the plan.        James Barnes MD     D: 08/16/2020 9:51:16         HOLTER  SUMMARY:  No significant abnormalities explained the palpitations, very  occasional PACs and PVCs.     James Barnes MD     D: 12/03/2020 5:54:16       Results reviewed:  BNP: No results found for: BNP  CBC:   Lab Results   Component Value Date    WBC 5.9 03/08/2021    RBC 4.34 03/08/2021    HGB 14.3 03/08/2021    HCT 43.9 03/08/2021     03/08/2021     CMP:    Lab Results   Component Value Date     09/13/2021    K 4.5 09/13/2021    K 3.8 08/13/2020    K 3.9 08/13/2020     09/13/2021    CO2 27 09/13/2021    BUN 29 09/13/2021    CREATININE 1.1 09/13/2021    LABGLOM 48 09/13/2021    GLUCOSE 128 09/13/2021    CALCIUM 9.8 09/13/2021     Hepatic Function Panel:    Lab Results   Component Value Date    ALKPHOS 53 08/13/2020    ALT 8 08/13/2020    AST 12 08/13/2020    PROT 6.0 08/13/2020    BILITOT 0.4 08/13/2020    LABALBU 3.8 08/13/2020     Magnesium:    Lab Results   Component Value Date    MG 2.0 11/19/2020     PT/INR:    Lab Results   Component Value Date    INR 1.09 08/13/2020     Lipids:    Lab Results   Component Value Date    TRIG 124 08/13/2020    HDL 51 08/13/2020    LDLCALC 62 08/13/2020       ASSESSMENT AND PLAN:   The patient's condition/symptoms are Stable: No clinical evidence of fluid overload today. Continue current medical regimen without changes at present time. Diagnosis Orders   1. CHF (congestive heart failure), NYHA class II, chronic, diastolic (HCC)  Basic Metabolic Panel   2. Essential hypertension     3. Coronary artery disease involving native coronary artery of native heart without angina pectoris     4. HORTON (dyspnea on exertion)     5. Fatigue, unspecified type       Continue:  GDMT:              ACE/ARB/ARNI - no              BB - Lopressor 25 BID              Diuretic - Lasix 40 BID, Kcl 10 BID              Vasodilator - Imdur 60 BID               MRA - Aldactone 25/day  Digoxin - no              Antiarrhythmic - Cardizem 60 BID  Stable, appears Euvolemic  Lab reviewed - stable  Repeat blood work in   BP/HR stable   No med changes today   Continue diet/fluid adherence  Continue daily wts. Continue activity - walking/exercising daily. F/U w/ [facility]   F/U in clinic in 1 year    Tolerating above noted HF meds, no ill side effects noted. Will continue to monitor kidney function and electrolytes. Will optimize as tolerated. Pt is compliant w/ medications.     Total visit time of 30 minutes has been spent with patient on education of symptoms, management, medication, and plan of care; as well as review of chart: labs, ECHO, radiology reports,

## 2021-12-21 ENCOUNTER — HOSPITAL ENCOUNTER (OUTPATIENT)
Dept: WOMENS IMAGING | Age: 76
Discharge: HOME | End: 2021-12-21

## 2021-12-21 ENCOUNTER — HOSPITAL ENCOUNTER (OUTPATIENT)
Dept: WOMENS IMAGING | Age: 76
Discharge: HOME | End: 2021-12-21
Payer: MEDICARE

## 2021-12-21 DIAGNOSIS — Z12.31 ENCOUNTER FOR SCREENING MAMMOGRAM FOR MALIGNANT NEOPLASM OF BREAST: ICD-10-CM

## 2021-12-21 DIAGNOSIS — Z00.6 ENCOUNTER FOR EXAMINATION FOR NORMAL COMPARISON OR CONTROL IN CLINICAL RESEARCH PROGRAM: ICD-10-CM

## 2021-12-21 PROCEDURE — 77063 BREAST TOMOSYNTHESIS BI: CPT

## 2022-01-13 ENCOUNTER — OFFICE VISIT (OUTPATIENT)
Dept: CARDIOLOGY CLINIC | Age: 77
End: 2022-01-13
Payer: MEDICARE

## 2022-01-13 VITALS
SYSTOLIC BLOOD PRESSURE: 98 MMHG | WEIGHT: 180 LBS | HEIGHT: 64 IN | BODY MASS INDEX: 30.73 KG/M2 | HEART RATE: 67 BPM | DIASTOLIC BLOOD PRESSURE: 58 MMHG

## 2022-01-13 DIAGNOSIS — I25.10 CORONARY ARTERY DISEASE INVOLVING NATIVE CORONARY ARTERY OF NATIVE HEART WITHOUT ANGINA PECTORIS: ICD-10-CM

## 2022-01-13 DIAGNOSIS — R06.02 SOB (SHORTNESS OF BREATH) ON EXERTION: ICD-10-CM

## 2022-01-13 DIAGNOSIS — R07.9 CHEST PAIN, UNSPECIFIED TYPE: Primary | ICD-10-CM

## 2022-01-13 DIAGNOSIS — I50.32 CHF (CONGESTIVE HEART FAILURE), NYHA CLASS II, CHRONIC, DIASTOLIC (HCC): ICD-10-CM

## 2022-01-13 DIAGNOSIS — I10 ESSENTIAL HYPERTENSION: ICD-10-CM

## 2022-01-13 DIAGNOSIS — R93.1 ABNORMAL FINDINGS ON CARDIAC CATHETERIZATION: ICD-10-CM

## 2022-01-13 PROCEDURE — 99215 OFFICE O/P EST HI 40 MIN: CPT | Performed by: INTERNAL MEDICINE

## 2022-01-13 PROCEDURE — G8417 CALC BMI ABV UP PARAM F/U: HCPCS | Performed by: INTERNAL MEDICINE

## 2022-01-13 PROCEDURE — 4040F PNEUMOC VAC/ADMIN/RCVD: CPT | Performed by: INTERNAL MEDICINE

## 2022-01-13 PROCEDURE — G8484 FLU IMMUNIZE NO ADMIN: HCPCS | Performed by: INTERNAL MEDICINE

## 2022-01-13 PROCEDURE — G8427 DOCREV CUR MEDS BY ELIG CLIN: HCPCS | Performed by: INTERNAL MEDICINE

## 2022-01-13 PROCEDURE — G8400 PT W/DXA NO RESULTS DOC: HCPCS | Performed by: INTERNAL MEDICINE

## 2022-01-13 PROCEDURE — 1123F ACP DISCUSS/DSCN MKR DOCD: CPT | Performed by: INTERNAL MEDICINE

## 2022-01-13 PROCEDURE — 3017F COLORECTAL CA SCREEN DOC REV: CPT | Performed by: INTERNAL MEDICINE

## 2022-01-13 PROCEDURE — 93000 ELECTROCARDIOGRAM COMPLETE: CPT | Performed by: INTERNAL MEDICINE

## 2022-01-13 PROCEDURE — 1090F PRES/ABSN URINE INCON ASSESS: CPT | Performed by: INTERNAL MEDICINE

## 2022-01-13 PROCEDURE — 1036F TOBACCO NON-USER: CPT | Performed by: INTERNAL MEDICINE

## 2022-01-13 RX ORDER — ISOSORBIDE MONONITRATE 30 MG/1
60 TABLET, EXTENDED RELEASE ORAL 2 TIMES DAILY
Qty: 180 TABLET | Refills: 1 | Status: SHIPPED | OUTPATIENT
Start: 2022-01-13 | End: 2022-07-11 | Stop reason: SDUPTHER

## 2022-01-13 RX ORDER — DILTIAZEM HYDROCHLORIDE 60 MG/1
60 TABLET, FILM COATED ORAL 2 TIMES DAILY
Qty: 180 TABLET | Refills: 3 | Status: CANCELLED | OUTPATIENT
Start: 2022-01-13

## 2022-01-13 RX ORDER — NITROGLYCERIN 0.4 MG/1
0.4 TABLET SUBLINGUAL EVERY 5 MIN PRN
Qty: 25 TABLET | Refills: 1 | Status: SHIPPED | OUTPATIENT
Start: 2022-01-13

## 2022-01-13 RX ORDER — FUROSEMIDE 40 MG/1
40 TABLET ORAL 2 TIMES DAILY
Qty: 180 TABLET | Refills: 3 | Status: SHIPPED | OUTPATIENT
Start: 2022-01-13 | End: 2022-06-14

## 2022-01-13 NOTE — PROGRESS NOTES
[id number] [facility] [address]  [place] [address]  Dept: [phone]  Dept Fax: [phone]  Loc: [phone]    Visit Date: 1/13/2022    Ms. Keyshawn Hall is a 77 y.o. female  who presented for:  Chief Complaint   Patient presents with    1 Year Follow Up    Chest Pain    Shortness of Breath       HPI:   HPI   76 yo F hx of PDA stenosis, preserved EF, grade 1 DD who presents for evaluation. Has chest pain and towards the right breast.  She is on Ranexa. She feels fatigued and tired. She notes some pain when she is doing work, such as ADLS. Sharp, lasts 30 seconds to a couple minutes. She has not use NTG SL. She is worried about breast cancer. Her sisters x 3 have breast cancer. She is taking DAPT, no bleeding. BP 98/58 but usually 140s. She has no energy just walking back and forth. She is very sob at times. She tries to force herself to walk. No side effects. EF 50-55%, 6/2020.         Current Outpatient Medications:     clopidogrel (PLAVIX) 75 MG tablet, Take 1 tablet by mouth daily, Disp: 90 tablet, Rfl: 3    simvastatin (ZOCOR) 10 MG tablet, Take 1 tablet by mouth nightly, Disp: 90 tablet, Rfl: 3    spironolactone (ALDACTONE) 25 MG tablet, Take 1 tablet by mouth daily, Disp: 90 tablet, Rfl: 3    isosorbide mononitrate (IMDUR) 30 MG extended release tablet, Take 2 tablets by mouth 2 times daily, Disp: 180 tablet, Rfl: 1    potassium chloride (KLOR-CON 10) 10 MEQ extended release tablet, Take 1 tablet by mouth 2 times daily, Disp: 180 tablet, Rfl: 5    dilTIAZem (CARDIZEM) 60 MG tablet, Take 1 tablet by mouth 2 times daily, Disp: 180 tablet, Rfl: 3    furosemide (LASIX) 40 MG tablet, Take 1 tablet by mouth 2 times daily, Disp: 180 tablet, Rfl: 3    metoprolol tartrate (LOPRESSOR) 25 MG tablet, Take 1 tablet by mouth 2 times daily, Disp: 180 tablet, Rfl: 3    nitroGLYCERIN (NITROSTAT) 0.4 MG SL tablet, Place 1 tablet under the tongue every 5 minutes as needed for Chest pain (Maximum 3 doses), Disp: 25 tablet, Rfl: 1    vitamin E 400 UNIT capsule, Take 400 Units by mouth daily, Disp: , Rfl:     Biotin 10 MG CAPS, Take by mouth, Disp: , Rfl:     Past Medical History  Aleksey Plummer  has a past medical history of CAD (coronary artery disease), Chronic kidney disease, Hyperlipidemia, and Hypertension. Social History  Keyanna  reports that she has never smoked. She has never used smokeless tobacco. She reports previous alcohol use. She reports that she does not use drugs. Family History  Aleksey Plummer family history includes Breast Cancer (age of onset: 54) in her sister; Breast Cancer (age of onset: 62) in her niece; Breast Cancer (age of onset: 61) in her sister; Cancer in her brother, mother, sister, sister, sister, sister, and sister; Heart Disease in her father. There is no family history of bicuspid aortic valve, aneurysms, heart transplant, pacemakers, defibrillators, or sudden cardiac death. Past Surgical History   Past Surgical History:   Procedure Laterality Date    APPENDECTOMY      CAROTID STENT PLACEMENT      CHOLECYSTECTOMY      COLONOSCOPY      CYSTO/URETERO/PYELOSCOPY, CALCULUS TX N/A 5/8/2019    CYSTO, LEFT URETEROSCOPY, URETERORENOSCOPY, LASER LITOHTRIPSY, BASKET RETRIEVAL OF STONE FRAGMENTS, LEFT URETERAL STENT performed by Melisa Benito MD at [address] [place] [address]      age 37    HYSTERECTOMY, [place]      OVARY REMOVAL         Review of Systems   Constitutional: Negative for chills and fever  HENT: Negative for congestion, sinus pressure, sneezing and sore throat. Eyes: Negative for pain, discharge, redness and itching. Respiratory: Negative for apnea, cough  Gastrointestinal: Negative for blood in stool, constipation, diarrhea   Endocrine: Negative for cold intolerance, heat intolerance, polydipsia.   Genitourinary: Negative for dysuria, enuresis, flank pain and hematuria. Musculoskeletal: Negative for arthralgias, joint swelling and neck pain. Neurological: Negative for numbness and headaches. Psychiatric/Behavioral: Negative for agitation, confusion, decreased concentration and dysphoric mood. Objective:     BP (!) 98/58   Pulse 67   Ht 5' 4\" (1.626 m)   Wt 180 lb (81.6 kg)   BMI 30.90 kg/m²     Wt Readings from Last 3 Encounters:   01/13/22 180 lb (81.6 kg)   09/13/21 178 lb (80.7 kg)   03/04/21 176 lb 3.2 oz (79.9 kg)     BP Readings from Last 3 Encounters:   01/13/22 (!) 98/58   09/13/21 108/60   03/04/21 110/60       Nursing note and vitals reviewed. Physical Exam   Constitutional: Oriented to person, place, and time. Appears well-developed and well-nourished. HENT:   Head: Normocephalic and atraumatic. Eyes: EOM are normal. Pupils are equal, round, and reactive to light. Neck: Normal range of motion. Neck supple. No JVD present. Cardiovascular: Normal rate, regular rhythm, normal heart sounds and intact distal pulses. No murmur heard. Pulmonary/Chest: Effort normal and breath sounds normal. No respiratory distress. No wheezes. No rales. Abdominal: Soft. Bowel sounds are normal. No distension. There is no tenderness. Musculoskeletal: Normal range of motion. No edema. Neurological: Alert and oriented to person, place, and time. No cranial nerve deficit. Coordination normal.   Skin: Skin is warm and dry. Bilateral upper extremity ecchymoses. Psychiatric: Normal mood and affect.        No results found for: CKTOTAL, CKMB, CKMBINDEX    Lab Results   Component Value Date    WBC 5.9 03/08/2021    RBC 4.34 03/08/2021    HGB 14.3 03/08/2021    HCT 43.9 03/08/2021    .2 03/08/2021    MCH 32.9 03/08/2021    MCHC 32.6 03/08/2021    RDW 12.7 03/28/2019     03/08/2021    MPV 9.4 03/08/2021       Lab Results   Component Value Date     09/13/2021    K 4.5 09/13/2021    K 3.8 08/13/2020    K 3.9 08/13/2020     09/13/2021 CO2 27 09/13/2021    BUN 29 09/13/2021    LABALBU 3.8 08/13/2020    CREATININE 1.1 09/13/2021    CALCIUM 9.8 09/13/2021    LABGLOM 48 09/13/2021    GLUCOSE 128 09/13/2021       Lab Results   Component Value Date    ALKPHOS 53 08/13/2020    ALT 8 08/13/2020    AST 12 08/13/2020    PROT 6.0 08/13/2020    BILITOT 0.4 08/13/2020    LABALBU 3.8 08/13/2020       Lab Results   Component Value Date    MG 2.0 11/19/2020       Lab Results   Component Value Date    INR 1.09 08/13/2020         No results found for: LABA1C    Lab Results   Component Value Date    TRIG 124 08/13/2020    HDL 51 08/13/2020    LDLCALC 62 08/13/2020       Lab Results   Component Value Date    TSH 2.58 03/08/2021         Testing Reviewed:      I have individually reviewed the cardiac test below:    ECHO: Results for orders placed during the hospital encounter of 06/22/20    Echo 2D w doppler w color complete    Narrative  Transthoracic Echocardiography Report (TTE)    Demographics    Patient Name   Frantz Lopez  Gender               Female  H    MR #           [id number]     Race                     Ethnicity    Account #      [de-identified]     Room Number    Accession      [id number]     Date of Study        06/22/2020  Number    Date of Birth  1945    Referring Physician  Beatriz Pak MD    Age            69 year(s)    Joao Kumar, RDCS, RVT    Interpreting         Viki Pak MD  Physician    Procedure    Type of Study    TTE procedure:ECHOCARDIOGRAM COMPLETE 2D W DOPPLER W COLOR. Procedure Date  Date: 06/22/2020 Start: 12:37 PM    Study Location: Echo Lab  Technical Quality: Limited visualization due to body habitus. Indications:Shortness of breath. Additional Medical History:fatigue, Dyspnea on exertion.     Patient Status: Routine    Height: 63 inches Weight: 199 pounds BSA: 1.93 m^2 BMI: 35.25 kg/m^2    BP: 126/70 mmHg    Conclusions    Summary  Ejection fraction was estimated at 50-55%. E/A flow reversal noted. Suggestive of diastolic. Ascending aorta = 3.6 cm. IVC size is within normal limits with normal respiratory phasic changes. Signature    ----------------------------------------------------------------  Electronically signed by Jewell Esquivel MD (Interpreting  physician) on 06/22/2020 at 04:15 PM  ----------------------------------------------------------------    Findings    Mitral Valve  The mitral valve structure was normal with normal leaflet separation. DOPPLER: The transmitral velocity was within the normal range with no  evidence for mitral stenosis. There was no evidence of mitral  regurgitation. Aortic Valve  The aortic valve was trileaflet with normal thickness and cuspal  separation. DOPPLER: Transaortic velocity was within the normal range with  no evidence of aortic stenosis. There was mild aortic regurgitation. Tricuspid Valve  The tricuspid valve structure was normal with normal leaflet separation. DOPPLER: There was no evidence of tricuspid stenosis. There was no  evidence of tricuspid regurgitation. Pulmonic Valve  The pulmonic valve leaflets were not well seen. DOPPLER: The transpulmonic  velocity was within the normal range with no evidence for regurgitation. Left Atrium  Left atrial size was normal.    Left Ventricle  Normal left ventricular size and systolic function. There were no regional wall motion abnormalities. Wall thickness was within normal limits. Ejection fraction was estimated at 50-55%. E/A flow reversal noted. Suggestive of diastolic dysfunction. Right Atrium  Right atrial size was normal.    Right Ventricle  The right ventricular size was normal with normal systolic function and  wall thickness. Pericardial Effusion  The pericardium was normal in appearance with no evidence of a pericardial  effusion. Pleural Effusion  No evidence of pleural effusion.     Aorta / Great Vessels  -Ascending aorta = 3.6 discomfort - getting worse as compared to prior, have tried conservative therapy but she continues to get more and more symptoms  Fatigue/HORTON  Know PDA stenosis  Preserved EF  HTN  FHx of Breast Ca - she has screening mammogram, that is negative  She is having worse symptoms and getting more chest pain and more HORTON. She has known severe PDA stenosis--concern for progression. Her BP is low as well. Recommend R/LHC to evaluate for progression and volume status. D/c Diltiazem for now due to BP issues. DAPT without bleeding. R/B/A of the procedure discussed including PCI and she wants to proceed. Will repeat TTE to evaluate for EF and valvulopathies. Discussed symptoms needing emergency care. Rx NTG SL prn as needed. Discussed diet/exercise/BP/weight loss/health lifestyle choices/lipids; the patient understands the goals and will try to comply.       Disposition:  Cath, echo         Electronically signed by Fauzia Pearson MD   1/13/2022 at 11:46 AM EST

## 2022-01-17 ENCOUNTER — PREP FOR PROCEDURE (OUTPATIENT)
Dept: CARDIOLOGY | Age: 77
End: 2022-01-17

## 2022-01-17 RX ORDER — DIPHENHYDRAMINE HYDROCHLORIDE 50 MG/ML
50 INJECTION INTRAMUSCULAR; INTRAVENOUS ONCE
Status: CANCELLED | OUTPATIENT
Start: 2022-01-17 | End: 2022-01-17

## 2022-01-17 RX ORDER — SODIUM CHLORIDE 0.9 % (FLUSH) 0.9 %
5-40 SYRINGE (ML) INJECTION PRN
Status: CANCELLED | OUTPATIENT
Start: 2022-01-17

## 2022-01-17 RX ORDER — ASPIRIN 325 MG
325 TABLET ORAL ONCE
Status: CANCELLED | OUTPATIENT
Start: 2022-01-17 | End: 2022-01-17

## 2022-01-17 RX ORDER — NITROGLYCERIN 0.4 MG/1
0.4 TABLET SUBLINGUAL EVERY 5 MIN PRN
Status: CANCELLED | OUTPATIENT
Start: 2022-01-17

## 2022-01-17 RX ORDER — SODIUM CHLORIDE 9 MG/ML
25 INJECTION, SOLUTION INTRAVENOUS PRN
Status: CANCELLED | OUTPATIENT
Start: 2022-01-17

## 2022-01-17 RX ORDER — SODIUM CHLORIDE 0.9 % (FLUSH) 0.9 %
5-40 SYRINGE (ML) INJECTION EVERY 12 HOURS SCHEDULED
Status: CANCELLED | OUTPATIENT
Start: 2022-01-17

## 2022-01-17 RX ORDER — SODIUM CHLORIDE 9 MG/ML
INJECTION, SOLUTION INTRAVENOUS CONTINUOUS
Status: CANCELLED | OUTPATIENT
Start: 2022-01-17

## 2022-01-18 ENCOUNTER — HOSPITAL ENCOUNTER (OUTPATIENT)
Dept: INPATIENT UNIT | Age: 77
Discharge: HOME | End: 2022-01-19
Attending: INTERNAL MEDICINE | Admitting: INTERNAL MEDICINE
Payer: MEDICARE

## 2022-01-18 DIAGNOSIS — R07.9 CHEST PAIN, UNSPECIFIED TYPE: ICD-10-CM

## 2022-01-18 DIAGNOSIS — I50.32 CHF (CONGESTIVE HEART FAILURE), NYHA CLASS II, CHRONIC, DIASTOLIC (HCC): ICD-10-CM

## 2022-01-18 DIAGNOSIS — I10 ESSENTIAL HYPERTENSION: ICD-10-CM

## 2022-01-18 DIAGNOSIS — I25.10 CORONARY ARTERY DISEASE INVOLVING NATIVE CORONARY ARTERY OF NATIVE HEART WITHOUT ANGINA PECTORIS: ICD-10-CM

## 2022-01-18 DIAGNOSIS — R06.02 SOB (SHORTNESS OF BREATH) ON EXERTION: ICD-10-CM

## 2022-01-18 LAB
ABO: NORMAL
ACTIVATED CLOTTING TIME: 178 SECONDS (ref 1–150)
ACTIVATED CLOTTING TIME: 208 SECONDS (ref 1–150)
ACTIVATED CLOTTING TIME: 267 SECONDS (ref 1–150)
ACTIVATED CLOTTING TIME: 517 SECONDS (ref 1–150)
ANION GAP SERPL CALCULATED.3IONS-SCNC: 13 MEQ/L (ref 8–16)
ANTIBODY SCREEN: NORMAL
APTT: 29.7 SECONDS (ref 22–38)
BUN BLDV-MCNC: 23 MG/DL (ref 7–22)
CALCIUM SERPL-MCNC: 9.4 MG/DL (ref 8.5–10.5)
CHLORIDE BLD-SCNC: 103 MEQ/L (ref 98–111)
CO2: 26 MEQ/L (ref 23–33)
COLLECTED BY:: ABNORMAL
COLLECTED BY:: NORMAL
CREAT SERPL-MCNC: 1.3 MG/DL (ref 0.4–1.2)
EKG ATRIAL RATE: 70 BPM
EKG P AXIS: 24 DEGREES
EKG P-R INTERVAL: 168 MS
EKG Q-T INTERVAL: 412 MS
EKG QRS DURATION: 88 MS
EKG QTC CALCULATION (BAZETT): 444 MS
EKG R AXIS: 6 DEGREES
EKG T AXIS: 47 DEGREES
EKG VENTRICULAR RATE: 70 BPM
ERYTHROCYTE [DISTWIDTH] IN BLOOD BY AUTOMATED COUNT: 13.5 % (ref 11.5–14.5)
ERYTHROCYTE [DISTWIDTH] IN BLOOD BY AUTOMATED COUNT: 50.4 FL (ref 35–45)
GFR SERPL CREATININE-BSD FRML MDRD: 40 ML/MIN/1.73M2
GLUCOSE BLD-MCNC: 113 MG/DL (ref 70–108)
HCT VFR BLD CALC: 45.8 % (ref 37–47)
HEMOGLOBIN: 15.4 GM/DL (ref 12–16)
INR BLD: 1.12 (ref 0.85–1.13)
LV EF: 53 %
LVEF MODALITY: NORMAL
MCH RBC QN AUTO: 33.9 PG (ref 26–33)
MCHC RBC AUTO-ENTMCNC: 33.6 GM/DL (ref 32.2–35.5)
MCV RBC AUTO: 100.9 FL (ref 81–99)
PLATELET # BLD: 246 THOU/MM3 (ref 130–400)
PMV BLD AUTO: 9 FL (ref 9.4–12.4)
POC O2 SATURATION: 73 % (ref 94–97)
POC O2 SATURATION: 96 % (ref 94–97)
POTASSIUM REFLEX MAGNESIUM: 4.2 MEQ/L (ref 3.5–5.2)
RBC # BLD: 4.54 MILL/MM3 (ref 4.2–5.4)
RH FACTOR: NORMAL
SODIUM BLD-SCNC: 142 MEQ/L (ref 135–145)
SOURCE, BLOOD GAS: ABNORMAL
SOURCE, BLOOD GAS: NORMAL
WBC # BLD: 8.5 THOU/MM3 (ref 4.8–10.8)

## 2022-01-18 PROCEDURE — C1874 STENT, COATED/COV W/DEL SYS: HCPCS

## 2022-01-18 PROCEDURE — 93456 R HRT CORONARY ARTERY ANGIO: CPT | Performed by: INTERNAL MEDICINE

## 2022-01-18 PROCEDURE — 2500000003 HC RX 250 WO HCPCS

## 2022-01-18 PROCEDURE — 86850 RBC ANTIBODY SCREEN: CPT

## 2022-01-18 PROCEDURE — 6360000002 HC RX W HCPCS

## 2022-01-18 PROCEDURE — 80048 BASIC METABOLIC PNL TOTAL CA: CPT

## 2022-01-18 PROCEDURE — 92928 PRQ TCAT PLMT NTRAC ST 1 LES: CPT | Performed by: INTERNAL MEDICINE

## 2022-01-18 PROCEDURE — C1725 CATH, TRANSLUMIN NON-LASER: HCPCS

## 2022-01-18 PROCEDURE — C1769 GUIDE WIRE: HCPCS

## 2022-01-18 PROCEDURE — 93010 ELECTROCARDIOGRAM REPORT: CPT | Performed by: NUCLEAR MEDICINE

## 2022-01-18 PROCEDURE — C1894 INTRO/SHEATH, NON-LASER: HCPCS

## 2022-01-18 PROCEDURE — 6370000000 HC RX 637 (ALT 250 FOR IP)

## 2022-01-18 PROCEDURE — 6360000004 HC RX CONTRAST MEDICATION: Performed by: INTERNAL MEDICINE

## 2022-01-18 PROCEDURE — 93005 ELECTROCARDIOGRAM TRACING: CPT | Performed by: STUDENT IN AN ORGANIZED HEALTH CARE EDUCATION/TRAINING PROGRAM

## 2022-01-18 PROCEDURE — C9600 PERC DRUG-EL COR STENT SING: HCPCS

## 2022-01-18 PROCEDURE — 86900 BLOOD TYPING SEROLOGIC ABO: CPT

## 2022-01-18 PROCEDURE — 6370000000 HC RX 637 (ALT 250 FOR IP): Performed by: STUDENT IN AN ORGANIZED HEALTH CARE EDUCATION/TRAINING PROGRAM

## 2022-01-18 PROCEDURE — 86901 BLOOD TYPING SEROLOGIC RH(D): CPT

## 2022-01-18 PROCEDURE — 93306 TTE W/DOPPLER COMPLETE: CPT

## 2022-01-18 PROCEDURE — C1887 CATHETER, GUIDING: HCPCS

## 2022-01-18 PROCEDURE — 6370000000 HC RX 637 (ALT 250 FOR IP): Performed by: INTERNAL MEDICINE

## 2022-01-18 PROCEDURE — 85610 PROTHROMBIN TIME: CPT

## 2022-01-18 PROCEDURE — 93460 R&L HRT ART/VENTRICLE ANGIO: CPT

## 2022-01-18 PROCEDURE — 82810 BLOOD GASES O2 SAT ONLY: CPT

## 2022-01-18 PROCEDURE — 85347 COAGULATION TIME ACTIVATED: CPT

## 2022-01-18 PROCEDURE — 85730 THROMBOPLASTIN TIME PARTIAL: CPT

## 2022-01-18 PROCEDURE — 36415 COLL VENOUS BLD VENIPUNCTURE: CPT

## 2022-01-18 PROCEDURE — 85027 COMPLETE CBC AUTOMATED: CPT

## 2022-01-18 PROCEDURE — 2580000003 HC RX 258: Performed by: STUDENT IN AN ORGANIZED HEALTH CARE EDUCATION/TRAINING PROGRAM

## 2022-01-18 RX ORDER — ATORVASTATIN CALCIUM 10 MG/1
10 TABLET, FILM COATED ORAL NIGHTLY
Refills: 3 | Status: DISCONTINUED | OUTPATIENT
Start: 2022-01-18 | End: 2022-01-19 | Stop reason: HOSPADM

## 2022-01-18 RX ORDER — SODIUM CHLORIDE 0.9 % (FLUSH) 0.9 %
5-40 SYRINGE (ML) INJECTION EVERY 12 HOURS SCHEDULED
Status: DISCONTINUED | OUTPATIENT
Start: 2022-01-18 | End: 2022-01-19 | Stop reason: HOSPADM

## 2022-01-18 RX ORDER — POTASSIUM CHLORIDE 750 MG/1
10 TABLET, FILM COATED, EXTENDED RELEASE ORAL 2 TIMES DAILY
Status: DISCONTINUED | OUTPATIENT
Start: 2022-01-18 | End: 2022-01-19 | Stop reason: HOSPADM

## 2022-01-18 RX ORDER — ASPIRIN 325 MG
325 TABLET ORAL ONCE
Status: COMPLETED | OUTPATIENT
Start: 2022-01-18 | End: 2022-01-18

## 2022-01-18 RX ORDER — ACETAMINOPHEN 325 MG/1
650 TABLET ORAL EVERY 4 HOURS PRN
Status: DISCONTINUED | OUTPATIENT
Start: 2022-01-18 | End: 2022-01-19 | Stop reason: HOSPADM

## 2022-01-18 RX ORDER — SODIUM CHLORIDE 0.9 % (FLUSH) 0.9 %
5-40 SYRINGE (ML) INJECTION EVERY 12 HOURS SCHEDULED
Status: DISCONTINUED | OUTPATIENT
Start: 2022-01-18 | End: 2022-01-18 | Stop reason: SDUPTHER

## 2022-01-18 RX ORDER — SPIRONOLACTONE 25 MG/1
25 TABLET ORAL DAILY
Status: DISCONTINUED | OUTPATIENT
Start: 2022-01-19 | End: 2022-01-19 | Stop reason: HOSPADM

## 2022-01-18 RX ORDER — ISOSORBIDE MONONITRATE 60 MG/1
60 TABLET, EXTENDED RELEASE ORAL 2 TIMES DAILY
Status: DISCONTINUED | OUTPATIENT
Start: 2022-01-18 | End: 2022-01-19 | Stop reason: HOSPADM

## 2022-01-18 RX ORDER — CLOPIDOGREL BISULFATE 75 MG/1
75 TABLET ORAL DAILY
Status: DISCONTINUED | OUTPATIENT
Start: 2022-01-19 | End: 2022-01-19 | Stop reason: HOSPADM

## 2022-01-18 RX ORDER — FUROSEMIDE 40 MG/1
40 TABLET ORAL 2 TIMES DAILY
Status: DISCONTINUED | OUTPATIENT
Start: 2022-01-18 | End: 2022-01-19 | Stop reason: HOSPADM

## 2022-01-18 RX ORDER — SODIUM CHLORIDE 0.9 % (FLUSH) 0.9 %
5-40 SYRINGE (ML) INJECTION PRN
Status: DISCONTINUED | OUTPATIENT
Start: 2022-01-18 | End: 2022-01-19 | Stop reason: HOSPADM

## 2022-01-18 RX ORDER — DIPHENHYDRAMINE HYDROCHLORIDE 50 MG/ML
50 INJECTION INTRAMUSCULAR; INTRAVENOUS ONCE
Status: DISCONTINUED | OUTPATIENT
Start: 2022-01-18 | End: 2022-01-19 | Stop reason: HOSPADM

## 2022-01-18 RX ORDER — SODIUM CHLORIDE 9 MG/ML
25 INJECTION, SOLUTION INTRAVENOUS PRN
Status: DISCONTINUED | OUTPATIENT
Start: 2022-01-18 | End: 2022-01-19 | Stop reason: HOSPADM

## 2022-01-18 RX ORDER — CLOPIDOGREL BISULFATE 75 MG/1
75 TABLET ORAL ONCE
Status: COMPLETED | OUTPATIENT
Start: 2022-01-18 | End: 2022-01-18

## 2022-01-18 RX ORDER — SODIUM CHLORIDE 9 MG/ML
25 INJECTION, SOLUTION INTRAVENOUS PRN
Status: DISCONTINUED | OUTPATIENT
Start: 2022-01-18 | End: 2022-01-18 | Stop reason: SDUPTHER

## 2022-01-18 RX ORDER — SODIUM CHLORIDE 0.9 % (FLUSH) 0.9 %
5-40 SYRINGE (ML) INJECTION PRN
Status: DISCONTINUED | OUTPATIENT
Start: 2022-01-18 | End: 2022-01-18 | Stop reason: SDUPTHER

## 2022-01-18 RX ORDER — SODIUM CHLORIDE 9 MG/ML
INJECTION, SOLUTION INTRAVENOUS CONTINUOUS
Status: DISCONTINUED | OUTPATIENT
Start: 2022-01-18 | End: 2022-01-19 | Stop reason: HOSPADM

## 2022-01-18 RX ORDER — NITROGLYCERIN 0.4 MG/1
0.4 TABLET SUBLINGUAL EVERY 5 MIN PRN
Status: DISCONTINUED | OUTPATIENT
Start: 2022-01-18 | End: 2022-01-19 | Stop reason: HOSPADM

## 2022-01-18 RX ADMIN — ISOSORBIDE MONONITRATE 60 MG: 60 TABLET, EXTENDED RELEASE ORAL at 20:49

## 2022-01-18 RX ADMIN — METOPROLOL 25 MG: 25 TABLET ORAL at 20:49

## 2022-01-18 RX ADMIN — CLOPIDOGREL BISULFATE 75 MG: 75 TABLET ORAL at 14:25

## 2022-01-18 RX ADMIN — SODIUM CHLORIDE: 9 INJECTION, SOLUTION INTRAVENOUS at 11:33

## 2022-01-18 RX ADMIN — IOPAMIDOL 50 ML: 755 INJECTION, SOLUTION INTRAVENOUS at 16:23

## 2022-01-18 RX ADMIN — ATORVASTATIN CALCIUM 10 MG: 10 TABLET, FILM COATED ORAL at 20:49

## 2022-01-18 RX ADMIN — ASPIRIN 325 MG: 325 TABLET ORAL at 14:25

## 2022-01-18 NOTE — H&P
[facility]  Sedation/Analgesia History & Physical    Pt Name: Jaci Pham  Account number: [de-identified]  MRN: [id number]  YOB: 1945  Provider Performing Procedure: Javad Tovar MD MD  Referring Provider: Javad Tovar MD   Primary Care Physician: Oscar Jesus MD  Date: 1/18/2022    PRE-PROCEDURE    Code Status: FULL CODE  Brief History/Pre-Procedure Diagnosis:   CCS 3 angina  OMT  SOB    Consent: : I have discussed with the patient risks, benefits, and alternatives (and relevant risks, benefits, and side effects related to alternatives or not receiving care), and likelihood of the success. The patient and/or representative appear to understand and agree to proceed. The discussion encompasses risks, benefits, and side effects related to the alternatives and the risks related to not receiving the proposed care, treatment, and services. The indication, risks and benefits of the procedure and possible therapeutic consequences and alternatives were discussed with the patient. The patient was given the opportunity to ask questions and to have them answered to his/her satisfaction. Risks of the procedure include but are not limited to the following: Bleeding, hematoma including retroperitoneal hemmorhage, infection, pain and discomfort, injury to the aorta and other blood vessels, rhythm disturbance, low blood pressure, myocardial infarction, stroke, kidney damage/failure, myocardial perforation, allergic reactions to sedatives/contrast material, loss of pulse/vascular compromise requiring surgery, aneurysm/pseudoaneurysm formation, possible loss of a limb/hand/leg, needing blood transfusion, requiring emergent open heart surgery or emergent coronary intervention, the need for intubation/respiratory support, the requirement for defibrillation/cardioversion, and death. Alternatives to and omission of the suggested procedure were discussed.  The patient had no further questions and wished to proceed; the consent form was signed. MEDICAL HISTORY   has a past medical history of CAD (coronary artery disease), Chronic kidney disease, Hyperlipidemia, and Hypertension. SURGICAL HISTORY   has a past surgical history that includes Hysterectomy, vaginal; Cholecystectomy; Ovary removal; Kidney stone surgery; Carotid stent placement; cysto/uretero/pyeloscopy, calculus tx (N/A, 5/8/2019); Appendectomy; Colonoscopy; and Hysterectomy. Additional information:       ALLERGIES   Allergies as of 01/18/2022 - Fully Reviewed 01/18/2022   Allergen Reaction Noted    Codeine Other (See Comments) 04/10/2019     Additional information:       MEDICATIONS     Current Facility-Administered Medications:     0.9 % sodium chloride infusion, , IntraVENous, Continuous, Doy Yony, PA-C, Last Rate: 75 mL/hr at 01/18/22 1133, New Bag at 01/18/22 1133    0.9 % sodium chloride infusion, 25 mL, IntraVENous, PRN, Doy Yony, PA-C    diphenhydrAMINE (BENADRYL) injection 50 mg, 50 mg, IntraVENous, Once, Doy Yony, PA-C    hydrocortisone sodium succinate PF (SOLU-CORTEF) injection 200 mg, 200 mg, IntraVENous, Once, Doy Yony, PA-C    nitroGLYCERIN (NITROSTAT) SL tablet 0.4 mg, 0.4 mg, SubLINGual, Q5 Min PRN, Doy Yony, PA-C    sodium chloride flush 0.9 % injection 5-40 mL, 5-40 mL, IntraVENous, 2 times per day, Doy Yony, PA-C    sodium chloride flush 0.9 % injection 5-40 mL, 5-40 mL, IntraVENous, PRN, Doy Yony, PA-C  Prior to Admission medications    Medication Sig Start Date End Date Taking?  Authorizing Provider   furosemide (LASIX) 40 MG tablet Take 1 tablet by mouth 2 times daily 1/13/22  Yes Yulia Ascencio MD   isosorbide mononitrate (IMDUR) 30 MG extended release tablet Take 2 tablets by mouth 2 times daily 1/13/22  Yes Yulia Ascencio MD   metoprolol tartrate (LOPRESSOR) 25 MG tablet Take 1 tablet by mouth 2 times daily 1/13/22  Yes Yulia Ascencio MD clopidogrel (PLAVIX) 75 MG tablet Take 1 tablet by mouth daily 9/13/21  Yes An Leisure, APRN - CNP   simvastatin (ZOCOR) 10 MG tablet Take 1 tablet by mouth nightly 9/13/21  Yes An Leisure, APRN - CNP   spironolactone (ALDACTONE) 25 MG tablet Take 1 tablet by mouth daily 9/13/21  Yes An Leisure, APRN - CNP   potassium chloride (KLOR-CON 10) 10 MEQ extended release tablet Take 1 tablet by mouth 2 times daily 3/4/21  Yes An Leisure, APRN - CNP   nitroGLYCERIN (NITROSTAT) 0.4 MG SL tablet Place 1 tablet under the tongue every 5 minutes as needed for Chest pain (Maximum 3 doses) 1/13/22   Javad Tovar MD     Additional information:       VITAL SIGNS   Vitals:    01/18/22 1100   BP: 114/76   Pulse: 71   Resp: 25   Temp: 98.7 °F (37.1 °C)   SpO2: 95%       PHYSICAL:   General: No acute distress  HEENT:  Unremarkable for age  Neck: without increased JVD, carotid pulses 2+ bilaterally without bruits  Heart: RRR, S1 & S2 WNL, S4 gallop, without murmurs or rubs   NYHA: 2  Lungs: Clear to auscultation    Abdomen: BS present, without HSM, masses, or tenderness    Extremities: without C,C,E.  Pulses 2+ bilaterally  Mental Status: Alert & Oriented        PLANNED PROCEDURE   [x]Cath  [x]PCI                []Pacemaker/AICD  []JODI             []Cardioversion []Peripheral angiography/PTA  [x]Other: RHC     SEDATION  Planned agent:[x]Midazolam []Meperidine [x]Sublimaze []Morphine  []Diazepam  []Other:     ASA Classification:  []1 []2 [x]3 []4 []5  Class 1: A normal healthy patient  Class 2: Pt with mild to moderate systemic disease  Class 3: Severe systemic disease or disturbance  Class 4: Severe systemic disorders that are already life threatening. Class 5: Moribund pt with little chances of survival, for more than 24 hours. Mallampati I Airway Classification:   []1 []2 [x]3 []4    [x]Pre-procedure diagnostic studies complete and results available.    Comment:    [x]Previous sedation/anesthesia experiences assessed. Comment:    [x]The patient is an appropriate candidate to undergo the planned procedure sedation and anesthesia. (Refer to nursing sedation/analgesia documentation record)  [x]Formulation and discussion of sedation/procedure plan, risks, and expectations with patient and/or responsible adult completed. [x]Patient examined immediately prior to the procedure.  (Refer to nursing sedation/analgesia documentation record)    Juanjose Regalado MD MD   Electronically signed 1/18/2022 at 2:40 PM

## 2022-01-18 NOTE — FLOWSHEET NOTE
01/18/22 1045   Fall Risk Interventions   Hourly Visual Checks Awake  (arrived to room, daughter present)   Patient arrived 2 hours early. States that she was scheduled for an echo at 0930 then they sent her over to . Notified Elva Barrera in cath lab and she states to get the patient ready and they would try to get her early. Patient and daughter informed of this.

## 2022-01-18 NOTE — BRIEF OP NOTE
[facility]  Sedation/Analgesia Post Sedation Record    Pt Name: Celine Vega  Account number: [de-identified]  MRN: [id number]  YOB: 1945  Procedure Performed By: MD MD Viri Grove, [address]  Primary Care Physician: Eliana Jose MD  Date: 1/18/2022    POST-PROCEDURE    Physicians/Assistants: MD MD Viri Grove, GIDEON    Procedure Performed:Cath/ PCI      Sedation/Anesthesia: Versed/ Fentanyl and 2% xylocaine local anesthesia. Estimated Blood Loss: < 50 ml. Specimens Removed: None         Disposition of Specimen: N/A        Complications: No Immediate Complications.        Post-procedure Diagnosis/Findings:     PCI of PDA x 2 MD MD Viri Melendez, RPBERE  Electronically signed 1/18/2022 at 4:13 PM  Interventional Cardiology

## 2022-01-18 NOTE — PROGRESS NOTES
Care resumed from [facility]   Dr Nubia Barroso in to see pt and daughter, Dr Nubia Barroso given results of blood work, creatinine   1510 to procedure per bed

## 2022-01-18 NOTE — FLOWSHEET NOTE
Received in Cath Recovery post procedure. Report received from Cath Lab. Vasc band intact over right radial artery. No signs of bleeding or swelling at site. Hemostasis maintained. Armboard in place. See Post Cath Assessment flowsheet for assessments and vital signs. Heart monitor showing NSR . IV 1000 0.9 NS infusing at 100 ml per hour in left forearm.   Dr Lisa Lewis in to see pt and daughter

## 2022-01-18 NOTE — FLOWSHEET NOTE
Took over care pt. Right radial arterial site bruised with 2 vasc bands in place. Attempted to remove air from vasc band over site. Small red oozing noted. 2 mls of air replaced. 2 mls of air removed from second vasc band above insertion site. Area under second vasc band soft bruised. armboard cont. Upper right arm brachial site stable. Venous sheath cont. Act 267. 0.9 normal saline cont to right brachial right heart cath site. site stable. 0.9 normal saline cont to left forearm perpheral venous site. Pt alert and cooperative. Family at bedside. Questions addressed regarding possible discharge later.

## 2022-01-19 VITALS
SYSTOLIC BLOOD PRESSURE: 124 MMHG | OXYGEN SATURATION: 96 % | HEIGHT: 64 IN | WEIGHT: 169 LBS | HEART RATE: 89 BPM | RESPIRATION RATE: 19 BRPM | DIASTOLIC BLOOD PRESSURE: 78 MMHG | BODY MASS INDEX: 28.85 KG/M2 | TEMPERATURE: 97.9 F

## 2022-01-19 LAB
ANION GAP SERPL CALCULATED.3IONS-SCNC: 9 MEQ/L (ref 8–16)
BUN BLDV-MCNC: 21 MG/DL (ref 7–22)
CALCIUM SERPL-MCNC: 8.1 MG/DL (ref 8.5–10.5)
CHLORIDE BLD-SCNC: 109 MEQ/L (ref 98–111)
CO2: 23 MEQ/L (ref 23–33)
CREAT SERPL-MCNC: 0.9 MG/DL (ref 0.4–1.2)
GFR SERPL CREATININE-BSD FRML MDRD: 61 ML/MIN/1.73M2
GLUCOSE BLD-MCNC: 90 MG/DL (ref 70–108)
POTASSIUM SERPL-SCNC: 4.2 MEQ/L (ref 3.5–5.2)
SODIUM BLD-SCNC: 141 MEQ/L (ref 135–145)

## 2022-01-19 PROCEDURE — 36415 COLL VENOUS BLD VENIPUNCTURE: CPT

## 2022-01-19 PROCEDURE — 80048 BASIC METABOLIC PNL TOTAL CA: CPT

## 2022-01-19 NOTE — FLOWSHEET NOTE
Dangled and up to br. Voided and returned to bed. Right radial cath site stable. Bruised. vasc band off. bandaid to site. Pressure dressing over bandaid. Armboard cont. Right forearm iv site stable. Skin tear site stable. Pressure dressing cont due to oozing.

## 2022-01-19 NOTE — PROCEDURES
[address] [place] [address]                            CARDIAC CATHETERIZATION    PATIENT NAME: Shawanda Rowland                     :        1945  MED REC NO:   [id number]                           ROOM:       0009  ACCOUNT NO:   [de-identified]                           ADMIT DATE: 2022  PROVIDER:     Suhail Nash MD    DATE OF PROCEDURE:  2022    INDICATIONS:  CCS class III angina equivalent with severe dyspnea on  exertion; known severe RCA/PDA stenosis. DESCRIPTION OF PROCEDURE:  After informed consent was obtained from the  patient, she was brought to the cardiac catheterization laboratory and  prepped in sterile fashion. Right radial artery and right brachial vein  were chosen as the primary points of access. Preprocedure timeout was  completed. After infiltration of the right brachial region with 2%  lidocaine using micropuncture and modified Seldinger technique under  fluoroscopic guidance and ultrasound guidance, I was able to insert a  5-Thai sheath into the right brachial vein. After infiltration of the  right wrist with 2% lidocaine using micropuncture and modified Seldinger  technique under fluoroscopic guidance and ultrasound guidance, I was  able to insert a 6-Thai Slender sheath into the right radial artery. Standard right heart catheterization was performed with a Luz  balloon-tipped wedge catheter. Left heart catheterization was performed  with a 5-Thai JL3.5 and 5-Thai JR4 catheters. RIGHT HEART CATHETERIZATION:  RA 4, RV 26/3, PA 23/9 with a mean of 14,  wedge pressure 5, PA saturation 73%. CORONARY ANGIOGRAM:  LEFT MAIN:  Patent without any significant obstruction. LAD:  Mild luminal irregularities throughout, but no severe obstruction. SUZANNE-2 flow, small diagonal branch without any significant obstruction. LCX:  Previously placed stent in the proximal segment, patent. This  stent continues down to OM1 which is patent. RCA:  No significant obstruction in the proximal and mid segment. Bifurcates into large PDA and PLB. PDA has about 80% to 90% stenosis in  the mid segment. RCA is dominant. INTERVENTION:  Given the findings and presentation, I elected to proceed  with intervention. I exchanged out for a JR4 guiding catheter. I  cannulated RCA without complications. Heparin IV was given. ACT was  confirmed to be above 250 seconds. I wired the lesion using the  Runthrough wire. I then direct stented using a 2.25 x 18 Resolute [place]  QUINCY and stented the lesion. There was distal residual lesion as well to  the stent. I then postdilated the stent with 2.5 x 12 NC balloon up to  16 atmospheres. I then used a 2.0 x 12 Resolute [place] QUINCY distally to  the first stent deployed, deployed this in overlapping fashion at 8  atmospheres. I postdilated the stent with a 2.0 x 8 NC up to 20  atmospheres. Post-PCI angiography demonstrated SUZANNE-3 flow without any  perforation, dissection, distal embolization, side branch loss, guide or  guidewire-induced trauma. I did use a Guidezilla catheter for extra  support during the case. IMMEDIATE COMPLICATIONS:  None. MEDICATIONS:  See EMR. ACCESS:  Vasc Band used for hemostasis. ESTIMATED BLOOD LOSS:  Less than 50 mL. SUMMARY:  Successful PCI of the PDA with two overlapping drug-eluting  stents; euvolemic right heart cath pressures with preserved cardiac  output. PLAN:  1. Bedrest.  2.  Optimal medical therapy. 3.  Risk factor management. 4.  Routine access site care. 5.  DAPT. 6.  Guideline-directed therapy for CAD. 7.  Guideline-directed therapy for CHF. 8.  Follow up in the office in two to four weeks postprocedure. All the above were explained with the patient and the patient's family. They are agreeable and amenable to the plan.         Rei Mccormick MD    D: 01/19/2022 11:13:09       T: 01/19/2022

## 2022-01-19 NOTE — FLOWSHEET NOTE
Pt sitting on side of bed. Attempting to get up to br. Assisted to br. Reminded her to call for help to br. Left forearm skin tear oozing. Site redressed gauze and paper tape. Right arm procedure sites stable. Armboard cont. 0.9 normal saline cont.

## 2022-01-19 NOTE — FLOWSHEET NOTE
vasc band removed above right radial procedure site vasc band. Site stable. Bruised. left forearm iv site oozing site redressed. Skin tear from tape removal noted on right forearm lateral side. Pressure dressing placed to skin tear that is oozing. Iv site redressed. No leaking noted. Act obtained. Results 208. Pt and her dtr aware pt will spend night to bleeding and high act and unable to pull right brachial venous sheath at this time. Pt and her dtr aggreeable to staying for pt observation overnight.

## 2022-01-19 NOTE — FLOWSHEET NOTE
Report given to [facility]. Right radial arterial site stable. Right hand forearm bruised. Pressure dressing over bandaid cont. Ice therapy cont. Armboard cont. Upper right arm brachial vein site stable. Left forearm skin tear site stable. Bruising noted. Left hand with bruising. Pink shadowing under gauze dressing noted. 0.9 normal saline cont.

## 2022-01-19 NOTE — FLOWSHEET NOTE
Right bracheal venous sheath removed. Manual pressure with quick clot initated and cont for 5 minutes. Site stable. Dressed with gauze over quick clot and tegaderm. Left forearm skin tear redressed. No active bleeding. Iv site bruised but not leaking. Right radial cath site stable. Bruised. Ice bag on right foream for comfort . Pt declined acetominophen. 0.9 normal saline cont.

## 2022-01-19 NOTE — PROGRESS NOTES
Inpatient Cardiac Rehabilitation Consult    Received consult for Phase II Cardiac Rehabilitation. Patient needs cardiac rehab due to PCI x 2 on 1/18/2022. Importance of Cardiac Rehab discussed with patient. Reviewed cardiac rehab class times. Patient questions answered. We will contact patient at home to schedule evaluation appointment. An Heredia would like to come to cardiac rehab here at [address] [place] [address]. Cardiac Rehab brochure given.

## 2022-02-15 NOTE — PROGRESS NOTES
[facility] PROFESSIONAL SERVICES  [facility]   [address]   [address] [address]   Dept: [phone]   Dept Fax: [phone]   Loc: [phone]      Chief Complaint   Patient presents with    Follow-up     4-6 week      Cardiologist:  Dr. Lisa Lewis  76 yo female presents for f/u of [facility] with PCI to PDA. Hx of PDA stenosis, G1DDx, normal EF. SOB about the same. CP on the right side. Thinks she has breast cancer, mammogram was negative but extensive family history. Not feeling any better since [facility], not any worse either. Denies left sided chest pain, significant SOB, fatigue. Will see Gyn doctor when able. May need referral to pulm if symptoms do not improve. Does not seem to be cardiac related.      General:   No fever, no chills, no weight loss, + fatigue  Pulmonary:    + dyspnea, no wheezing  Cardiac:    Denies recent chest pain   GI:     No nausea or vomiting, no abdominal pain  Neuro:     No dizziness or light headedness  Musculoskeletal:  No recent active issues  Extremities:   No edema      Past Medical History:   Diagnosis Date    CAD (coronary artery disease)     PCI     Chronic kidney disease     stones    Hyperlipidemia     Hypertension        Allergies   Allergen Reactions    Codeine Other (See Comments)     Breathing difficulties       Current Outpatient Medications   Medication Sig Dispense Refill    furosemide (LASIX) 40 MG tablet Take 1 tablet by mouth 2 times daily 180 tablet 3    isosorbide mononitrate (IMDUR) 30 MG extended release tablet Take 2 tablets by mouth 2 times daily 180 tablet 1    metoprolol tartrate (LOPRESSOR) 25 MG tablet Take 1 tablet by mouth 2 times daily 180 tablet 3    nitroGLYCERIN (NITROSTAT) 0.4 MG SL tablet Place 1 tablet under the tongue every 5 minutes as needed for Chest pain (Maximum 3 doses) 25 tablet 1    clopidogrel (PLAVIX) 75 MG tablet Take 1 tablet by mouth daily 90 tablet 3    simvastatin (ZOCOR) 10 MG tablet Take 1 tablet by mouth nightly 90 tablet 3    spironolactone (ALDACTONE) 25 MG tablet Take 1 tablet by mouth daily 90 tablet 3    potassium chloride (KLOR-CON 10) 10 MEQ extended release tablet Take 1 tablet by mouth 2 times daily 180 tablet 5     No current facility-administered medications for this visit. Social History     Socioeconomic History    Marital status:      Spouse name: Juaquin Lee Number of children: 2    Years of education: Not on file    Highest education level: Not on file   Occupational History    Not on file   Tobacco Use    Smoking status: Never Smoker    Smokeless tobacco: Never Used   Vaping Use    Vaping Use: Never used   Substance and Sexual Activity    Alcohol use: Not Currently    Drug use: Never    Sexual activity: Not Currently   Other Topics Concern    Not on file   Social History Narrative    Not on file     Social Determinants of Health     Financial Resource Strain:     Difficulty of Paying Living Expenses: Not on file   Food Insecurity:     Worried About Running Out of Food in the Last Year: Not on file    Anna of Food in the Last Year: Not on file   Transportation Needs:     Lack of Transportation (Medical): Not on file    Lack of Transportation (Non-Medical):  Not on file   Physical Activity:     Days of Exercise per Week: Not on file    Minutes of Exercise per Session: Not on file   Stress:     Feeling of Stress : Not on file   Social Connections:     Frequency of Communication with Friends and Family: Not on file    Frequency of Social Gatherings with Friends and Family: Not on file    Attends Hindu Services: Not on file    Active Member of Clubs or Organizations: Not on file    Attends Club or Organization Meetings: Not on file    Marital Status: Not on file   Intimate Partner Violence:     Fear of Current or Ex-Partner: Not on file    Emotionally Abused: Not on file    Physically Abused: Not on file    Sexually Abused: Not on file Housing Stability:     Unable to Pay for Housing in the Last Year: Not on file    Number of Places Lived in the Last Year: Not on file    Unstable Housing in the Last Year: Not on file       Family History   Problem Relation Age of Onset    Cancer Mother     Heart Disease Father     Breast Cancer Sister 54   [address] [facility] Sister 61    Cancer Sister     Cancer Sister     Cancer Sister    Ltarell Upton Sister     Breast Cancer Niece 62       Blood pressure 110/71, pulse 66, height 5' 4\" (1.626 m), weight 180 lb 14.4 oz (82.1 kg), not currently breastfeeding. General:   Well developed, well nourished  Lungs:   Clear to auscultation, no crackles  Heart:    Normal S1 S2, No murmur, rubs, or gallops  Abdomen:   Soft, non tender, no organomegalies, positive bowel sounds  Extremities:   +1 bilat LE edema, no cyanosis, good peripheral pulses  Neurological:   Awake, alert, oriented. No obvious focal deficits  Musculoskeletal:  No obvious deformities    EKG:   TTE  Conclusions      Summary   Ejection fraction was estimated at 50-55%. IVC size is within normal limits with normal respiratory phasic      Signature      ----------------------------------------------------------------   Electronically signed by Art Kennedy MD (Interpreting   physician) on 01/18/2022     [facility]  CORONARY ANGIOGRAM:  LEFT MAIN:  Patent without any significant obstruction.     LAD:  Mild luminal irregularities throughout, but no severe obstruction. SUZANNE-2 flow, small diagonal branch without any significant obstruction.     LCX:  Previously placed stent in the proximal segment, patent. This  stent continues down to OM1 which is patent.     RCA:  No significant obstruction in the proximal and mid segment. Bifurcates into large PDA and PLB. PDA has about 80% to 90% stenosis in  the mid segment.   RCA is dominant.     INTERVENTION:  Given the findings and presentation, I elected to proceed  with intervention. I exchanged out for a JR4 guiding catheter. I  cannulated RCA without complications. Heparin IV was given. ACT was  confirmed to be above 250 seconds. I wired the lesion using the  Runthrough wire. I then direct stented using a 2.25 x 18 Resolute [place]  QUINCY and stented the lesion. There was distal residual lesion as well to  the stent. I then postdilated the stent with 2.5 x 12 NC balloon up to  16 atmospheres. I then used a 2.0 x 12 Resolute [place] QUINCY distally to  the first stent deployed, deployed this in overlapping fashion at 8  atmospheres. I postdilated the stent with a 2.0 x 8 NC up to 20  atmospheres. Post-PCI angiography demonstrated SUZANNE-3 flow without any  perforation, dissection, distal embolization, side branch loss, guide or  guidewire-induced trauma. I did use a Guidezilla catheter for extra  support during the case.     IMMEDIATE COMPLICATIONS:  None.     MEDICATIONS:  See EMR.     ACCESS:  Vasc Band used for hemostasis.     ESTIMATED BLOOD LOSS:  Less than 50 mL.     SUMMARY:  Successful PCI of the PDA with two overlapping drug-eluting  stents; euvolemic right heart cath pressures with preserved cardiac  output.     PLAN:  1. Bedrest.  2.  Optimal medical therapy. 3.  Risk factor management. 4.  Routine access site care. 5.  DAPT. 6.  Guideline-directed therapy for CAD. 7.  Guideline-directed therapy for CHF. 8.  Follow up in the office in two to four weeks postprocedure.     All the above were explained with the patient and the patient's family. They are agreeable and amenable to the plan.           Hurtis Litten, MD     D: 01/19/2022     Diagnosis Orders   1. Chronic diastolic CHF (congestive heart failure) ([facility] [address]     2. Coronary artery disease involving native coronary artery of native heart without angina pectoris     3. S/P cardiac cath         No orders of the defined types were placed in this encounter.       Assessment/Plan:   Chronic diastolic CHF- euvolemic on exam, on OMT with bb/lasix/aldactone/nitro SL. CAD s/p LHC with PCI to PDA- plavix/bb/nitro SL/imdur/KCl/statin. Disposition:   F/u with Dr Lisa Lewis as scheduled.    Continue Dr Eitan Frances current treatment plan  Follow up with Dr Lisa Lewis as scheduled or sooner if needed

## 2022-02-17 ENCOUNTER — OFFICE VISIT (OUTPATIENT)
Dept: CARDIOLOGY CLINIC | Age: 77
End: 2022-02-17
Payer: MEDICARE

## 2022-02-17 VITALS
DIASTOLIC BLOOD PRESSURE: 71 MMHG | SYSTOLIC BLOOD PRESSURE: 110 MMHG | HEART RATE: 66 BPM | HEIGHT: 64 IN | BODY MASS INDEX: 30.88 KG/M2 | WEIGHT: 180.9 LBS

## 2022-02-17 DIAGNOSIS — I50.32 CHRONIC DIASTOLIC CHF (CONGESTIVE HEART FAILURE) (HCC): Primary | ICD-10-CM

## 2022-02-17 DIAGNOSIS — Z98.890 S/P CARDIAC CATH: ICD-10-CM

## 2022-02-17 DIAGNOSIS — I25.10 CORONARY ARTERY DISEASE INVOLVING NATIVE CORONARY ARTERY OF NATIVE HEART WITHOUT ANGINA PECTORIS: ICD-10-CM

## 2022-02-17 PROCEDURE — 1123F ACP DISCUSS/DSCN MKR DOCD: CPT | Performed by: STUDENT IN AN ORGANIZED HEALTH CARE EDUCATION/TRAINING PROGRAM

## 2022-02-17 PROCEDURE — 3017F COLORECTAL CA SCREEN DOC REV: CPT | Performed by: STUDENT IN AN ORGANIZED HEALTH CARE EDUCATION/TRAINING PROGRAM

## 2022-02-17 PROCEDURE — 4040F PNEUMOC VAC/ADMIN/RCVD: CPT | Performed by: STUDENT IN AN ORGANIZED HEALTH CARE EDUCATION/TRAINING PROGRAM

## 2022-02-17 PROCEDURE — 1036F TOBACCO NON-USER: CPT | Performed by: STUDENT IN AN ORGANIZED HEALTH CARE EDUCATION/TRAINING PROGRAM

## 2022-02-17 PROCEDURE — G8427 DOCREV CUR MEDS BY ELIG CLIN: HCPCS | Performed by: STUDENT IN AN ORGANIZED HEALTH CARE EDUCATION/TRAINING PROGRAM

## 2022-02-17 PROCEDURE — 1090F PRES/ABSN URINE INCON ASSESS: CPT | Performed by: STUDENT IN AN ORGANIZED HEALTH CARE EDUCATION/TRAINING PROGRAM

## 2022-02-17 PROCEDURE — 99214 OFFICE O/P EST MOD 30 MIN: CPT | Performed by: STUDENT IN AN ORGANIZED HEALTH CARE EDUCATION/TRAINING PROGRAM

## 2022-02-17 PROCEDURE — G8400 PT W/DXA NO RESULTS DOC: HCPCS | Performed by: STUDENT IN AN ORGANIZED HEALTH CARE EDUCATION/TRAINING PROGRAM

## 2022-02-17 PROCEDURE — G8417 CALC BMI ABV UP PARAM F/U: HCPCS | Performed by: STUDENT IN AN ORGANIZED HEALTH CARE EDUCATION/TRAINING PROGRAM

## 2022-02-17 PROCEDURE — G8484 FLU IMMUNIZE NO ADMIN: HCPCS | Performed by: STUDENT IN AN ORGANIZED HEALTH CARE EDUCATION/TRAINING PROGRAM

## 2022-02-17 NOTE — PROGRESS NOTES
Pt C/O CP every once in a while as pt did take a nitro the other day and it did not work, SOB, light headed at times, some swelling in feet. Fatigued alot more.      Pt denies CP, SOB, Headache, dizziness, heart palpitations, swelling, fatigue

## 2022-03-16 ENCOUNTER — APPOINTMENT (OUTPATIENT)
Dept: WOMENS IMAGING | Age: 77
End: 2022-03-16
Payer: MEDICARE

## 2022-03-16 ENCOUNTER — HOSPITAL ENCOUNTER (OUTPATIENT)
Dept: WOMENS IMAGING | Age: 77
Discharge: HOME | End: 2022-03-16
Payer: MEDICARE

## 2022-03-16 DIAGNOSIS — N64.4 PAIN IN BREAST: ICD-10-CM

## 2022-03-16 DIAGNOSIS — N64.4 BREAST PAIN, RIGHT: ICD-10-CM

## 2022-03-16 PROCEDURE — 76642 ULTRASOUND BREAST LIMITED: CPT

## 2022-03-17 ENCOUNTER — TELEPHONE (OUTPATIENT)
Dept: CARDIOLOGY CLINIC | Age: 77
End: 2022-03-17

## 2022-03-17 DIAGNOSIS — R53.83 FATIGUE, UNSPECIFIED TYPE: ICD-10-CM

## 2022-03-17 DIAGNOSIS — R06.02 SOB (SHORTNESS OF BREATH) ON EXERTION: Primary | ICD-10-CM

## 2022-03-17 NOTE — TELEPHONE ENCOUNTER
Per Dr. Chavez Serum okay to refer to pulmonary. LM for patient to call our office back.    Does she have a preference on pulmonologist?

## 2022-03-17 NOTE — TELEPHONE ENCOUNTER
Patient called and stated that Dr Leone Less wanted her to see a Lung specialist after her mammogram was done. It is done and no CA. ........ Can I have an order?

## 2022-03-21 ENCOUNTER — TELEPHONE (OUTPATIENT)
Dept: CARDIOLOGY CLINIC | Age: 77
End: 2022-03-21

## 2022-03-21 NOTE — TELEPHONE ENCOUNTER
Dr Any Drew does not wish to order this testing, but would still like to refer patient. [facility] - *(PLZ reply all)  Received: Today  [facility] Clinical Support Pool  Cc: P [facility] Central Referral Department    Hello - Patient is scheduled 6/1 with Dr. Rosemary Mojica. Doctor would like to see a chest XR (within 30 days of appt), and Pulmonary Function Test completed by patient prior to being seen. Patient is aware that testing will be ordered and that she would need to have completed prior to her appt.  Please call her to let her know when this is ready.    Thank you

## 2022-05-19 RX ORDER — POTASSIUM CHLORIDE 750 MG/1
TABLET, FILM COATED, EXTENDED RELEASE ORAL
Qty: 90 TABLET | Refills: 3 | Status: SHIPPED | OUTPATIENT
Start: 2022-05-19 | End: 2022-09-15 | Stop reason: SDUPTHER

## 2022-06-07 ENCOUNTER — TELEPHONE (OUTPATIENT)
Dept: PULMONOLOGY | Age: 77
End: 2022-06-07

## 2022-06-07 ENCOUNTER — OFFICE VISIT (OUTPATIENT)
Dept: PULMONOLOGY | Age: 77
End: 2022-06-07
Payer: MEDICARE

## 2022-06-07 VITALS
BODY MASS INDEX: 31.58 KG/M2 | HEIGHT: 64 IN | OXYGEN SATURATION: 97 % | HEART RATE: 64 BPM | WEIGHT: 185 LBS | TEMPERATURE: 97.1 F | SYSTOLIC BLOOD PRESSURE: 112 MMHG | DIASTOLIC BLOOD PRESSURE: 72 MMHG

## 2022-06-07 DIAGNOSIS — R06.02 SOB (SHORTNESS OF BREATH): Primary | ICD-10-CM

## 2022-06-07 DIAGNOSIS — R41.3 MEMORY IMPAIRMENT: ICD-10-CM

## 2022-06-07 DIAGNOSIS — J45.20 MILD INTERMITTENT ASTHMA, UNSPECIFIED WHETHER COMPLICATED: ICD-10-CM

## 2022-06-07 PROCEDURE — 94618 PULMONARY STRESS TESTING: CPT | Performed by: NURSE PRACTITIONER

## 2022-06-07 PROCEDURE — G8400 PT W/DXA NO RESULTS DOC: HCPCS | Performed by: NURSE PRACTITIONER

## 2022-06-07 PROCEDURE — 3017F COLORECTAL CA SCREEN DOC REV: CPT | Performed by: NURSE PRACTITIONER

## 2022-06-07 PROCEDURE — G8417 CALC BMI ABV UP PARAM F/U: HCPCS | Performed by: NURSE PRACTITIONER

## 2022-06-07 PROCEDURE — 1123F ACP DISCUSS/DSCN MKR DOCD: CPT | Performed by: NURSE PRACTITIONER

## 2022-06-07 PROCEDURE — 99214 OFFICE O/P EST MOD 30 MIN: CPT | Performed by: NURSE PRACTITIONER

## 2022-06-07 PROCEDURE — 1036F TOBACCO NON-USER: CPT | Performed by: NURSE PRACTITIONER

## 2022-06-07 PROCEDURE — G8427 DOCREV CUR MEDS BY ELIG CLIN: HCPCS | Performed by: NURSE PRACTITIONER

## 2022-06-07 PROCEDURE — 1090F PRES/ABSN URINE INCON ASSESS: CPT | Performed by: NURSE PRACTITIONER

## 2022-06-07 PROCEDURE — 95012 NITRIC OXIDE EXP GAS DETER: CPT | Performed by: NURSE PRACTITIONER

## 2022-06-07 RX ORDER — FLUTICASONE PROPIONATE AND SALMETEROL XINAFOATE 115; 21 UG/1; UG/1
2 AEROSOL, METERED RESPIRATORY (INHALATION) 2 TIMES DAILY
Qty: 1 EACH | Refills: 11 | Status: SHIPPED | OUTPATIENT
Start: 2022-06-07 | End: 2022-07-28

## 2022-06-07 RX ORDER — ALBUTEROL SULFATE 90 UG/1
2 AEROSOL, METERED RESPIRATORY (INHALATION) EVERY 6 HOURS PRN
Qty: 1 EACH | Refills: 3 | Status: SHIPPED | OUTPATIENT
Start: 2022-06-07

## 2022-06-07 ASSESSMENT — ENCOUNTER SYMPTOMS
SHORTNESS OF BREATH: 1
WHEEZING: 0
EYES NEGATIVE: 1
GASTROINTESTINAL NEGATIVE: 1
COUGH: 0
ALLERGIC/IMMUNOLOGIC NEGATIVE: 1

## 2022-06-07 NOTE — TELEPHONE ENCOUNTER
I spoke to patient on 5/27/22 to rs appt for 6/1/22 with Dr Marcello Preciado. D/T provider being out of office. I offered an appt for 6/3/22 with Hamilton Clemens in our [facility] office, which patient declined [facility] office. I scheduled patient to 6/27/22 with Venu Manual  Patient was really upset that appointment was scheduled so far out and stated she can hardly breath and has been short of breath for the last 20 years, I advised patient to go to ED immediately if she can not breath and call PCP, patient stated she is not going to ED bc it has been like this for awhile. I called patient on 6/2/22 and offered sooner appt for 6/7/22 with Venu Manual patient accepted appt. Unfortunately d/t provider being out patient got r/s to 6/23/22 with Any Stringer. Patient was upset and stated she was sob again she was advised to go to the ED.

## 2022-06-07 NOTE — TELEPHONE ENCOUNTER
Patient calling in requesting a sooner appt if possible as a new patient. She said the office has rescheduled her appt muliple times, and she is very upset about it. She was supposed to be seen 6/7/2022 but was called at the last minute and rescheduled out again to 6/23/2022. But she said her health just keeps getting worse and worse. Please call her with any other options.

## 2022-06-07 NOTE — TELEPHONE ENCOUNTER
Patient called in upset that she cannot get in sooner for an appointment. Patient was originally scheduled with Dr. Padmaja Soria for 6/1 and had to be rescheduled. Jaci Pinedo LPN offered patient an appointment in [facility] for 6/3 but patient refused and patient was scheduled with Azul Gutierres for 6/7. Lexa Hernandez called in on 6/6 and 6/7 so patient had to be rescheduled again. I got patient in at our sooner opening with Rhys Mckinnon for 6/23. Patient expressed that she was unhappy with being rescheduled so many times and has to wait another two weeks for her appointment. No sooner openings then her current scheduled appointment with Татьяна Bhatt for 6/23.

## 2022-06-07 NOTE — PROGRESS NOTES
[facility]    Patient: Angie Ken, 77 y.o.   : 1945         Subjective     Chief Complaint   Patient presents with    New Patient     New pulmonary consult for SOB no recent testing     Other     neck 15     mp 3        HPI  Jake is here for evaluation of shortness of breath with referral from Dr. Ascencion Gaitan from [facility] Cardiology. History of recent cardiac cath done with PCI to PDA. Normal EF  Patient last seen by pulmonary physician in 2017 through [facility] looks like bronchoscopy was done at that time with no pertinent findings seen. No recent PFT to review today  Never a smoker   No home oxygen use currently - was ordered in the past and patient declined use   SNIFF test done in 2017 with normal results   Previously on CPAP therapy but stopped using prior to 2017  Previously used Symbicort and Albuterol with not much help   History of hiatal hernia but has not seen GI physician in years  SOB worse with activity  Presents today with family member   Patient is anxious  Feels like her \"mind is going\" sometimes   Worried about memory impairments shes has had lately - could not remember how to get home from doctors vist (from [facility])     Patient was never diagnosed with chronic respiratory condition ie asthma, COPD, or ILD. Patient has not been admitted or treated for pneumonia, pulmonary embolism, or respiratory failure. Patient has not been intubated for reasons other than planned procedures. Social History  Patient job history included  She has not had exposure to aerosolized particles or hazardous fumes. (Coal, dust, asbestos, molds ie Hay)  denies living on a farm that primarily raised crops, livestock or combination  admits to passive tobacco exposure from parents or work environment. denies to active tobacco smoking 0 PPD for 0 years for 0 pack years   denies exposure to pets/animals at home.   denies exposure to tuberculosis.   admits to hobbies they can no longer perform due to shortness of breath    Past Medical hx   PMH:  Past Medical History:   Diagnosis Date    CAD (coronary artery disease)     PCI     Chronic kidney disease     stones    Hyperlipidemia     Hypertension      SURGICAL HISTORY:  Past Surgical History:   Procedure Laterality Date    APPENDECTOMY      CAROTID STENT PLACEMENT      CHOLECYSTECTOMY      COLONOSCOPY      CYSTO/URETERO/PYELOSCOPY, CALCULUS TX N/A 5/8/2019    CYSTO, LEFT URETEROSCOPY, URETERORENOSCOPY, LASER LITOHTRIPSY, BASKET RETRIEVAL OF STONE FRAGMENTS, LEFT URETERAL STENT performed by Vida Vitale MD at [address]      age 37    HYSTERECTOMY, [place]      OVARY REMOVAL       SOCIAL HISTORY:  Social History     Tobacco Use    Smoking status: Never Smoker    Smokeless tobacco: Never Used   Vaping Use    Vaping Use: Never used   Substance Use Topics    Alcohol use: Not Currently    Drug use: Never     ALLERGIES:  Allergies   Allergen Reactions    Codeine Other (See Comments)     Breathing difficulties     FAMILY HISTORY:  Family History   Problem Relation Age of Onset    Cancer Mother     Heart Disease Father     Breast Cancer Sister 54    Cancer Brother     Breast Cancer Sister 61    Cancer Sister     Cancer Sister     Cancer Sister     Cancer Sister     Cancer Sister     Breast Cancer Niece 62     CURRENT MEDICATIONS:  Current Outpatient Medications   Medication Sig Dispense Refill    ADVAIR -21 MCG/ACT inhaler Inhale 2 puffs into the lungs 2 times daily 1 each 11    albuterol sulfate HFA (VENTOLIN HFA) 108 (90 Base) MCG/ACT inhaler Inhale 2 puffs into the lungs every 6 hours as needed for Wheezing or Shortness of Breath 1 each 3    potassium chloride (KLOR-CON) 10 MEQ extended release tablet Take 1 tablet by mouth twice daily 90 tablet 3    metoprolol tartrate (LOPRESSOR) 25 MG tablet Take 1 tablet by mouth 2 times daily 180 tablet 3    furosemide (LASIX) 40 MG tablet Take 1 tablet by mouth 2 times daily 180 tablet 3    isosorbide mononitrate (IMDUR) 30 MG extended release tablet Take 2 tablets by mouth 2 times daily 180 tablet 1    nitroGLYCERIN (NITROSTAT) 0.4 MG SL tablet Place 1 tablet under the tongue every 5 minutes as needed for Chest pain (Maximum 3 doses) 25 tablet 1    clopidogrel (PLAVIX) 75 MG tablet Take 1 tablet by mouth daily 90 tablet 3    simvastatin (ZOCOR) 10 MG tablet Take 1 tablet by mouth nightly 90 tablet 3    spironolactone (ALDACTONE) 25 MG tablet Take 1 tablet by mouth daily 90 tablet 3     No current facility-administered medications for this visit. [place] Stands ROS   Review of Systems   Constitutional: Positive for fatigue. Negative for chills and fever. HENT: Negative. Negative for congestion. Eyes: Negative. Respiratory: Positive for shortness of breath. Negative for cough and wheezing. Cardiovascular: Positive for leg swelling (R>L). Negative for chest pain. Gastrointestinal: Negative. Endocrine: Negative. Genitourinary: Negative. Musculoskeletal: Negative. Allergic/Immunologic: Negative. Neurological: Negative. Hematological: Negative. Psychiatric/Behavioral: Negative for sleep disturbance. The patient is nervous/anxious. Physical exam   /72 (Site: Left Upper Arm, Position: Sitting)   Pulse 64   Temp 97.1 °F (36.2 °C)   Ht 5' 4\" (1.626 m)   Wt 185 lb (83.9 kg)   SpO2 97% Comment: on ra  BMI 31.76 kg/m²    Wt Readings from Last 3 Encounters:   06/07/22 185 lb (83.9 kg)   02/17/22 180 lb 14.4 oz (82.1 kg)   01/18/22 169 lb (76.7 kg)       Physical Exam  Vitals and nursing note reviewed. Constitutional:       Appearance: She is well-developed. She is obese. HENT:      Head: Normocephalic and atraumatic. Eyes:      Conjunctiva/sclera: Conjunctivae normal.      Pupils: Pupils are equal, round, and reactive to light.    Neck: Vascular: No JVD. Cardiovascular:      Rate and Rhythm: Normal rate and regular rhythm. Heart sounds: Normal heart sounds. No murmur heard. No friction rub. No gallop. Pulmonary:      Effort: Pulmonary effort is normal. No respiratory distress. Breath sounds: Normal breath sounds. No wheezing or rales. Abdominal:      General: Bowel sounds are normal.      Palpations: Abdomen is soft. Musculoskeletal:         General: Normal range of motion. Cervical back: Normal range of motion and neck supple. Skin:     General: Skin is warm and dry. Capillary Refill: Capillary refill takes less than 2 seconds. Neurological:      Mental Status: She is alert and oriented to person, place, and time. Psychiatric:         Mood and Affect: Mood is anxious. Behavior: Behavior normal.         Thought Content: Thought content normal.         Cognition and Memory: Memory is impaired. Judgment: Judgment normal.          results   Lung Nodule Screening     [] Qualifies    [x] Does not qualify   [] Declined    [] Completed  . The USPSTFrecommends annual screening for lung cancer with low-dose computed tomography (LDCT) in adults aged 48 to [de-identified] years who have a 20 pack-year smoking history and currently smoke or have quit within the past 15 years. Screeningshould be discontinued once a person has not smoked for 15 years or develops a health problem that substantially limits life expectancy or the ability or willingness to have curative lung surgery. Reading from 2017 done through [facility] previous CT chest: No pe  FINDINGS:  No filling defects are noted within the pulmonary arteries to suggest the presence of an embolus. There is atelectasis versus early airspace disease in the right lung base with some internal air bronchograms. Minimal atelectatic changes are seen in the left. Patrisha Prier is no pleural effusion, consolidation or acute infiltrate.  There is no evidence an interstitial lung disease. The major airway is patent. There is no axillary, hilar or mediastinal adenopathy. No enhancing mediastinal masses.  Normal appearance of the heart without pericardial effusion.  No filling defects in the four chambers. Normal appearance of the aorta and great vessels. 2cm hypodense nodule noted in the right thyroid lobe. There is no hiatal hernia. The visualized portion of the upper abdomen demonstrates no acute or concerning abnormality.    Impression   IMPRESSION:     1.  No evidence of pulmonary embolus. 2.  Right basilar atelectasis versus airspace disease. Correlate clinically for possible pneumonia. Consider follow-up exam to ensure resolution. 3. Minimal atelectatic changes are noted on the left.     01/18/2022   ECHOCARDIOGRAM COMPLETE 2D W DOPPLER W COLOR. Conclusions      Summary   Ejection fraction was estimated at 50-55%. IVC size is within normal limits with normal respiratory phasic      Signature      ----------------------------------------------------------------   Electronically signed by Geneva Cabrera MD (Interpreting   physician) on 01/18/2022 at 05:56 PM    Six Minute Walk Test  Thereasa Castleman [birth year]    Six minute walk test done in my office today by my medical assistant. Keyanna's oxygen saturation at rest on room air was 97%. Her oxygen saturation dropped to 90% on room air with exertion after walking 972 feet and within 6 minutes. Patient ambulated a total of 972 feet with oxygen. Resting Dyspnea/Sharon score was 0 / 8  and 0 / 8  upon completion of the walk. Resting heart rate was  66 bpm and 100 bpm upon completing the walk. Assessment      Diagnosis Orders   1. SOB (shortness of breath)  6 Minute Walk Test    Full PFT Study With Bronchodilator    POCT Nitric Oxide    CT CHEST HIGH RESOLUTION    ADVAIR -21 MCG/ACT inhaler    albuterol sulfate HFA (VENTOLIN HFA) 108 (90 Base) MCG/ACT inhaler    Bronchial Challenge   2. Mild intermittent asthma, unspecified whether complicated  ADVAIR -09 MCG/ACT inhaler    Bronchial Challenge   3.  Memory impairment  Hanna Parry MD, Neurology, [facility]         Acute: asthma, COPD, LRTI, PE, PTX, panic attacks/psychosomatic, metabolic acidosis  Chronic: COPD, ILD, PEF, Bronchiectasis, chronic infection(TB), Heart failure, chronic arrhythmia, anemia, thyroid disease    Plan   -6MWT today- no oxygen needed with rest or activity   -PFT and HRCT outpatient  -methacholine challenge   -NIOX today in the office - elevated at 38 today - will repeat next visit   -RX done for Advair 115/21 mcg 2 puffs BID- discussed rinsing mouth afterwards   -RX done for Albuterol 2 puffs every 4-6 hours PRN for SOB/wheezing   -Cardiology records reviewed   -Advised to maintain pneumonia vaccine with PCP and to take flu vaccine this coming season.  -Advised patient to call office with any changes, questions, or concerns regarding respiratory status  -Referral to Neurology for memory impairment  -May discuss GI referral at next visit regarding hiatal hernia     Will see Vlad Solitario in: 2 months    Isis Cabrera CNP  6/7/2022

## 2022-06-07 NOTE — TELEPHONE ENCOUNTER
Spoke with [employer], patient will be added to the schedule 6/7/22 if able to come in. Phoned patient and patient accepted appointment.  Will be seen by Reji Collado 6/7 @ 1pm.

## 2022-06-09 ENCOUNTER — CARE COORDINATION (OUTPATIENT)
Dept: CARE COORDINATION | Age: 77
End: 2022-06-09

## 2022-06-09 ENCOUNTER — TELEPHONE (OUTPATIENT)
Dept: PULMONOLOGY | Age: 77
End: 2022-06-09

## 2022-06-09 NOTE — TELEPHONE ENCOUNTER
I called the patient and tried to go over the PAP program available for advair, I tried to tell her its her deductible but she told me she never pays for a deductible. She most likely has a deductible she owes and that is why its so much this fill, patient was upset she was put on an inhaler without an xray. She doesn't qualify for the PAP because she hasn't spent enough money at the pharmacy yet, the only other programs that offer assistance without the patient to spend any money first are Spiriva and Combivent.

## 2022-06-09 NOTE — TELEPHONE ENCOUNTER
Patient called very upset that you have ordered her for Advair and Albuterol and which the advir is costing her for $400 and she cant  Afford this. The albuterol is costing her $43.00 that she is on a fix income and cant afford these, but she did get the albuterol for now. Patient stated why did you order this if you didn't eval her which I advised her that she was in the office yesterday and you did eval her and even did a walk on her. I told her that I can send a message to Emmanuelle Wilder to see if she could help with these medications. She understood.

## 2022-06-09 NOTE — CARE COORDINATION
I spoke with the patient in regards to assistance on her inhaler Advair, I let her know the cost is high probably because of a deductible. Patient said she never pays that much for medications, I explained to her that is because this inhaler is a name brand. She was upset that she was given an inhaler without a xray of her lungs. I told her I will look into a different inhale that she might qualify for assistance with since she does not qualify for the PAP program for Advair.         [facility]   Medication Assistance  [address] [place] [address] and [employer]    (S) [phone] (A) [phone]

## 2022-06-14 ENCOUNTER — TELEPHONE (OUTPATIENT)
Dept: CARDIOLOGY CLINIC | Age: 77
End: 2022-06-14

## 2022-06-14 RX ORDER — FUROSEMIDE 40 MG/1
40 TABLET ORAL 2 TIMES DAILY
Qty: 210 TABLET | Refills: 3 | Status: SHIPPED | OUTPATIENT
Start: 2022-06-14 | End: 2022-09-15 | Stop reason: SDUPTHER

## 2022-06-14 NOTE — TELEPHONE ENCOUNTER
Patient was called to see if she wanted her medication to go to the [facility] but the patient stated that she wanted them to go to [facility] in [facility], on [address]. Patient had been having some extra swelling in her legs. Wanting to know if she can take another water pill. Swelling around the ankles, comes and goes with her. Bloating, None     SOB, More than usual, lung specialist had to wait two months to get in. Next appt. Should be July 27th.      weight gain off and on.      harder to breathe when laying down, no trouble breathing with exertion or lifting. Chest pain, once in a while, mild pain that doesn't last that long. Tired easily, but gets up and does the things that she needs to do. Cough-none.      Patient is on 40MG of Laxis

## 2022-06-14 NOTE — TELEPHONE ENCOUNTER
Yes take an extra Lasix 40 mg x 3 days   Call if symptoms not improved    Not sure what RXs to sign - didn't see any.

## 2022-06-20 NOTE — TELEPHONE ENCOUNTER
Patient calling back upset because the 2nd inhaler that you called in is more expensive. I do not see where you called in another inhaler. Please see Chito Medina's response about Spiriva or Combivent. I also told Kiesha Isabel to try [facility].

## 2022-06-21 NOTE — TELEPHONE ENCOUNTER
She needs and inhaled steroid due to inflammation in her airway NIOX was 38. Spiriva and combivent should not be used together.

## 2022-06-21 NOTE — TELEPHONE ENCOUNTER
Patient needs some kind of inhaled steroid inhaler would prefer LABA/ICS combo. She def needs rescue.

## 2022-06-22 NOTE — TELEPHONE ENCOUNTER
No I let her know since she hasn't paid her deductible off yet she won't qualify for any of the PAP programs and to call me when she does.

## 2022-07-11 ENCOUNTER — OFFICE VISIT (OUTPATIENT)
Dept: CARDIOLOGY CLINIC | Age: 77
End: 2022-07-11
Payer: MEDICARE

## 2022-07-11 VITALS
BODY MASS INDEX: 32.1 KG/M2 | SYSTOLIC BLOOD PRESSURE: 122 MMHG | WEIGHT: 188 LBS | HEART RATE: 64 BPM | DIASTOLIC BLOOD PRESSURE: 62 MMHG | HEIGHT: 64 IN

## 2022-07-11 DIAGNOSIS — R53.83 FATIGUE, UNSPECIFIED TYPE: Primary | ICD-10-CM

## 2022-07-11 DIAGNOSIS — Z98.890 S/P CARDIAC CATH: ICD-10-CM

## 2022-07-11 PROCEDURE — 1036F TOBACCO NON-USER: CPT | Performed by: INTERNAL MEDICINE

## 2022-07-11 PROCEDURE — 1123F ACP DISCUSS/DSCN MKR DOCD: CPT | Performed by: INTERNAL MEDICINE

## 2022-07-11 PROCEDURE — G8400 PT W/DXA NO RESULTS DOC: HCPCS | Performed by: INTERNAL MEDICINE

## 2022-07-11 PROCEDURE — 3017F COLORECTAL CA SCREEN DOC REV: CPT | Performed by: INTERNAL MEDICINE

## 2022-07-11 PROCEDURE — 1090F PRES/ABSN URINE INCON ASSESS: CPT | Performed by: INTERNAL MEDICINE

## 2022-07-11 PROCEDURE — G8427 DOCREV CUR MEDS BY ELIG CLIN: HCPCS | Performed by: INTERNAL MEDICINE

## 2022-07-11 PROCEDURE — 99212 OFFICE O/P EST SF 10 MIN: CPT | Performed by: INTERNAL MEDICINE

## 2022-07-11 PROCEDURE — G8417 CALC BMI ABV UP PARAM F/U: HCPCS | Performed by: INTERNAL MEDICINE

## 2022-07-11 RX ORDER — ISOSORBIDE MONONITRATE 30 MG/1
60 TABLET, EXTENDED RELEASE ORAL 2 TIMES DAILY
Qty: 180 TABLET | Refills: 1 | Status: SHIPPED | OUTPATIENT
Start: 2022-07-11

## 2022-07-11 NOTE — ADDENDUM NOTE
Addended by: Francine Hamm on: 7/11/2022 10:49 AM     Modules accepted: Orders Patient ID: Masha Magaña is a 81 y o  female  Assessment/Plan:    Sequelae, post-stroke  No subsequent symptoms to suggest any recurrent cerebral vascular ischemic event  Events occurred in September 2018 and characterized by multiple small infarcts scattered in distributions involving both cerebral hemispheres and cerebellar hemispheres with a proximal source for emboli and specifically cardiac considered  She continues to work with Cardiology  She remains on Eliquis in combination with an 81 mg aspirin  --to continue Eliquis and 81 mg aspirin as prescribed through Cardiology  --to continue her statin  --to continue working with Cardiology in her PCP with regard to vascular risk factor reduction  --long discussion today with regard to driving  Reviewed in detail with patient and daughter in law, Demetra Meneses, the results of the fitness to drive assessment which defined in 86% probability of failing a specialized road test, delineating issues with tracking/processing visual events, slow Nissen responses, impaired spatial relations, and difficulties with impulse control all contributing to the outcome  After the discussion, it was decided the patient would cease driving  Appropriate form will be submitted to [facility]  Migraine without aura and without status migrainosus, not intractable  Again, continues to do well with regard to her migraines, as long as she continues on her carbamazepine  In review this was started some 20 years ago in the aftermath of a single generalized motor seizure  Subsequently has had non epileptogenic EEGs but attempts at withdrawal off the carbamazepine have resulted in a significant recurrent issue with her migraine  Patient has as result opted to remain on carbamazepine  --continue carbamazepine 200 mg twice daily  --to complete her recent blood work, CBC and carbamazepine level      I spent a total of 30 min with the patient and her daughter in-law with greater than 50% of that time spent counseling , specifically discussing her diagnoses, the results of her formal driving evaluation, and her problems with safety behind the wheel of a car, as detailed above  She will follow up November/December 2020  Subjective:    HPI  Patient, now 81years of age, with a single seizure occurring in December 1999 and history of migraine without aura, returns today to review her status in the aftermath of stroke events which occurred in September of 2018  Accompanied today by her daughter in law, Praveena Ovalles  In review, she had multiple small stroke events involving cerebral and cerebellar hemispheres with the origin felt to be from a proximal embolic source  She has through Cardiology remained on Eliquis along with a daily 81 mg aspirin and has had no symptoms to suggest any further cerebral vascular ischemic events  However, concern raised regarding her safety behind the wheel of a car  This prompted formal evaluation through occupational therapy with a Fitness to Drive formal evaluation  Unfortunately, she did not do well, supporting an 86% probability of failing a specialized road test   Issues were raised with regard to tracking/processing visual events, slow in responses, impaired spatial relations, and also issues with impulse control  She also had head a single seizure occurring in 1999 and a background of migraine without aura  She was on carbamazepine for an interval of time to cover her seizure circumstance  However, after time and with non epileptogenic EEGs attempt/attempts were made to wean her off the carbamazepine but on each occasion she had the re-emergence of significant migraine difficulties  As a result she has wish to maintain carbamazepine for migraine control  She is doing extremely well from that standpoint with no recent migraine episodes to report  Blood work from December reviewed  CMP with creatinine 1 05, AST 20 and ALT 34   No CBC or carbamazepine level were drawn  Past Medical History:   Diagnosis Date    Dyslipidemia     Generalized seizure ([address] )     generilized motor seizure occuring in his sleep in December 1999    GERD (gastroesophageal reflux disease)     Hypertension     Migraine without aura     Recurrent UTI (urinary tract infection)     Vitamin D deficiency     measurable     Past Surgical History:   Procedure Laterality Date    CHOLECYSTECTOMY      TOTAL ABDOMINAL HYSTERECTOMY W/ BILATERAL SALPINGOOPHORECTOMY       Social History     Socioeconomic History    Marital status: /Civil Union     Spouse name: None    Number of children: None    Years of education: None    Highest education level: None   Occupational History    None   Social Needs    Financial resource strain: None    Food insecurity:     Worry: None     Inability: None    Transportation needs:     Medical: None     Non-medical: None   Tobacco Use    Smoking status: Never Smoker    Smokeless tobacco: Never Used   Substance and Sexual Activity    Alcohol use: Yes    Drug use: None    Sexual activity: None   Lifestyle    Physical activity:     Days per week: None     Minutes per session: None    Stress: None   Relationships    Social connections:     Talks on phone: None     Gets together: None     Attends Hindu service: None     Active member of club or organization: None     Attends meetings of clubs or organizations: None     Relationship status: None    Intimate partner violence:     Fear of current or ex partner: None     Emotionally abused: None     Physically abused: None     Forced sexual activity: None   Other Topics Concern    None   Social History Narrative    None     Family History   Problem Relation Age of Onset    Alzheimer's disease Mother [de-identified]        mid     Allergies   Allergen Reactions    Citalopram      Other reaction(s):  Other (See Comments)  insomnia    Dextrose 5%-Electrolyte #48     Levofloxacin      Other reaction(s): Other (See Comments)  "feels lousey"    Nitrofurantoin Other (See Comments)     diarrhea    Paroxetine Other (See Comments)     nausea    Pseudoephedrine Other (See Comments)     insomnia       Current Outpatient Medications:     amLODIPine (NORVASC) 5 mg tablet, Take 5 mg by mouth, Disp: , Rfl:     apixaban (ELIQUIS) 5 mg, Take 5 mg by mouth 2 (two) times a day , Disp: , Rfl:     aspirin (ECOTRIN LOW STRENGTH) 81 mg EC tablet, Take 81 mg by mouth daily, Disp: , Rfl:     atorvastatin (LIPITOR) 80 mg tablet, Take 80 mg by mouth, Disp: , Rfl:     calcium-vitamin D 250-100 MG-UNIT per tablet, Take 1 tablet by mouth daily , Disp: , Rfl:     carBAMazepine (CARBATROL) 200 mg 12 hr capsule, Take 200 mg by mouth, Disp: , Rfl:     Cholecalciferol 2000 units CAPS, Take 1 capsule by mouth 2 (two) times a day , Disp: , Rfl:     hydrochlorothiazide (MICROZIDE) 12 5 mg capsule, Take 12 5 mg by mouth, Disp: , Rfl:     lisinopril (ZESTRIL) 40 mg tablet, Take 40 mg by mouth, Disp: , Rfl:     multivitamin (THERAGRAN) TABS, Take 1 tablet by mouth, Disp: , Rfl:     omeprazole (PRILOSEC) 20 mg delayed release capsule, Take 20 mg by mouth 2 (two) times a day , Disp: , Rfl:     sertraline (ZOLOFT) 25 mg tablet, Take 25 mg by mouth, Disp: , Rfl:     trimethoprim (PROLOPRIM) 100 mg tablet, Take 100 mg by mouth, Disp: , Rfl:     Objective:    Blood pressure 130/66, pulse 74, resp  rate 16, height 5' 2 5" (1 588 m), weight 76 4 kg (168 lb 6 4 oz)  Physical Exam  Head normocephalic  Eyes nonicteric, with evidence of past cataract surgeries bilaterally  No audible anterior neck bruits  Lungs clear to auscultation  Rhythm regular  GI (abdomen) soft nontender  Bowel sounds present  No significant lower extremity edema  Neurological Exam  Alert  Oriented  No dysarthria  No obvious symbolic language difficulties in general conversation    Mildly apractic appearing spontaneous gait but gait stable  Cranial nerves 2-12 tested and grossly intact except for a modest right lower facial asymmetry, unchanged from prior examinations  Accurate with finger-to-nose bilaterally  Good symmetrical strength throughout the 4 extremities  No upper extremity drift  Muscle stretch reflexes modestly increased on right as compared to left, unchanged from the past   Toe responses difficult to interpret due to prominent withdrawal but did appear as in the past to be upgoing bilaterally  ROS:    Review of Systems   Constitutional: Negative  Negative for appetite change and fever  HENT: Negative  Negative for hearing loss, tinnitus, trouble swallowing and voice change  Eyes: Negative  Negative for photophobia and pain  Respiratory: Negative  Negative for shortness of breath  Cardiovascular: Negative  Negative for palpitations  Gastrointestinal: Positive for constipation  Negative for nausea and vomiting  Endocrine: Negative  Negative for cold intolerance and heat intolerance  Genitourinary: Negative  Negative for dysuria, frequency and urgency  Musculoskeletal: Negative  Negative for myalgias and neck pain  Skin: Negative  Negative for rash  Allergic/Immunologic: Negative  Neurological: Negative  Negative for dizziness, tremors, seizures, syncope, facial asymmetry, speech difficulty, weakness, light-headedness, numbness and headaches  Hematological: Negative  Does not bruise/bleed easily  Psychiatric/Behavioral: Positive for sleep disturbance  Negative for confusion and hallucinations  I personally reviewed the ROS as entered by the medical assistant  *Please note this document was created using voice recognition software and may contain sound-alike word errors  *

## 2022-07-11 NOTE — PROGRESS NOTES
[id number] [facility] [place] [address]  [place] [address]  Dept: [phone]  Dept Fax: [phone]  Loc: [phone]    Visit Date: 7/11/2022    Ms. Karena Hoffman is a 77 y.o. female  who presented for:  Chief Complaint   Patient presents with    Follow-up       HPI:   HPI   76 yo F hx of PDA stenosis s/p PCI x 2 QUINCY, preserved EF, grade 1 DD who presents for follow-up. She is worried that her mind is falling apart. No major bleeding on DAPT. She is alert and oriented but she feels as if she is declining. She is not seeing PCP. She is taking Lasix.         Current Outpatient Medications:     furosemide (LASIX) 40 MG tablet, Take 1 tablet by mouth 2 times daily AND as needed for wt gain, swelling, Disp: 210 tablet, Rfl: 3    ADVAIR -21 MCG/ACT inhaler, Inhale 2 puffs into the lungs 2 times daily, Disp: 1 each, Rfl: 11    albuterol sulfate HFA (VENTOLIN HFA) 108 (90 Base) MCG/ACT inhaler, Inhale 2 puffs into the lungs every 6 hours as needed for Wheezing or Shortness of Breath, Disp: 1 each, Rfl: 3    potassium chloride (KLOR-CON) 10 MEQ extended release tablet, Take 1 tablet by mouth twice daily, Disp: 90 tablet, Rfl: 3    metoprolol tartrate (LOPRESSOR) 25 MG tablet, Take 1 tablet by mouth 2 times daily, Disp: 180 tablet, Rfl: 3    isosorbide mononitrate (IMDUR) 30 MG extended release tablet, Take 2 tablets by mouth 2 times daily, Disp: 180 tablet, Rfl: 1    nitroGLYCERIN (NITROSTAT) 0.4 MG SL tablet, Place 1 tablet under the tongue every 5 minutes as needed for Chest pain (Maximum 3 doses), Disp: 25 tablet, Rfl: 1    clopidogrel (PLAVIX) 75 MG tablet, Take 1 tablet by mouth daily, Disp: 90 tablet, Rfl: 3    simvastatin (ZOCOR) 10 MG tablet, Take 1 tablet by mouth nightly, Disp: 90 tablet, Rfl: 3    spironolactone (ALDACTONE) 25 MG tablet, Take 1 tablet by mouth daily, Disp: 90 tablet, Rfl: 3    Past Medical History  Berry Harris  has a past medical history of CAD (coronary artery disease), Chronic kidney disease, Hyperlipidemia, and Hypertension. Social History  Keyanna  reports that she has never smoked. She has never used smokeless tobacco. She reports previous alcohol use. She reports that she does not use drugs. Family History  Christina Ochoa family history includes Breast Cancer (age of onset: 54) in her sister; Breast Cancer (age of onset: 62) in her niece; Breast Cancer (age of onset: 61) in her sister; Cancer in her brother, mother, sister, sister, sister, sister, and sister; Heart Disease in her father. There is no family history of bicuspid aortic valve, aneurysms, heart transplant, pacemakers, defibrillators, or sudden cardiac death. Past Surgical History   Past Surgical History:   Procedure Laterality Date    APPENDECTOMY      CAROTID STENT PLACEMENT      CHOLECYSTECTOMY      COLONOSCOPY      CYSTO/URETERO/PYELOSCOPY, CALCULUS TX N/A 5/8/2019    CYSTO, LEFT URETEROSCOPY, URETERORENOSCOPY, LASER LITOHTRIPSY, BASKET RETRIEVAL OF STONE FRAGMENTS, LEFT URETERAL STENT performed by Arnav Schrader MD at [address] [place] [address] ([address] [place] [address])      age 37    HYSTERECTOMY, [place]      OVARY REMOVAL         Review of Systems   Constitutional: Negative for chills and fever  HENT: Negative for congestion, sinus pressure, sneezing and sore throat. Eyes: Negative for pain, discharge, redness and itching. Respiratory: Negative for apnea, cough  Gastrointestinal: Negative for blood in stool, constipation, diarrhea   Endocrine: Negative for cold intolerance, heat intolerance, polydipsia. Genitourinary: Negative for dysuria, enuresis, flank pain and hematuria. Musculoskeletal: Negative for arthralgias, joint swelling and neck pain. Neurological: Negative for numbness and headaches. Psychiatric/Behavioral: Negative for agitation, confusion, decreased concentration and dysphoric mood. Objective:     /62   Pulse 64   Ht 5' 4\" (1.626 m)   Wt 188 lb (85.3 kg)   BMI 32.27 kg/m²     Wt Readings from Last 3 Encounters:   07/11/22 188 lb (85.3 kg)   06/07/22 185 lb (83.9 kg)   02/17/22 180 lb 14.4 oz (82.1 kg)     BP Readings from Last 3 Encounters:   07/11/22 122/62   06/07/22 112/72   02/17/22 110/71       Nursing note and vitals reviewed. Physical Exam   Constitutional: Oriented to person, place, and time. Appears well-developed and well-nourished. HENT:   Head: Normocephalic and atraumatic. Eyes: EOM are normal. Pupils are equal, round, and reactive to light. Neck: Normal range of motion. Neck supple. No JVD present. Cardiovascular: Normal rate, regular rhythm, normal heart sounds and intact distal pulses. No murmur heard. Pulmonary/Chest: Effort normal and breath sounds normal. No respiratory distress. No wheezes. No rales. Abdominal: Soft. Bowel sounds are normal. No distension. There is no tenderness. Musculoskeletal: Normal range of motion. 3+ edema. Neurological: Alert and oriented to person, place, and time. No cranial nerve deficit. Coordination normal.   Skin: Skin is warm and dry. Psychiatric: Normal mood and affect.        No results found for: CKTOTAL, CKMB, CKMBINDEX    Lab Results   Component Value Date/Time    WBC 8.5 01/18/2022 11:00 AM    RBC 4.54 01/18/2022 11:00 AM    HGB 15.4 01/18/2022 11:00 AM    HCT 45.8 01/18/2022 11:00 AM    .9 01/18/2022 11:00 AM    MCH 33.9 01/18/2022 11:00 AM    MCHC 33.6 01/18/2022 11:00 AM    RDW 12.7 03/28/2019 12:00 AM     01/18/2022 11:00 AM    MPV 9.0 01/18/2022 11:00 AM       Lab Results   Component Value Date/Time     01/19/2022 05:35 AM    K 4.2 01/19/2022 05:35 AM    K 4.2 01/18/2022 11:00 AM     01/19/2022 05:35 AM    CO2 23 01/19/2022 05:35 AM    BUN 21 01/19/2022 05:35 AM    LABALBU 3.8 08/13/2020 06:59 AM    CREATININE 0.9 01/19/2022 05:35 AM    CALCIUM 8.1 01/19/2022 05:35 AM LABGLOM 61 01/19/2022 05:35 AM    GLUCOSE 90 01/19/2022 05:35 AM       Lab Results   Component Value Date/Time    ALKPHOS 53 08/13/2020 06:59 AM    ALT 8 08/13/2020 06:59 AM    AST 12 08/13/2020 06:59 AM    PROT 6.0 08/13/2020 06:59 AM    BILITOT 0.4 08/13/2020 06:59 AM    LABALBU 3.8 08/13/2020 06:59 AM       Lab Results   Component Value Date/Time    MG 2.0 11/19/2020 10:16 AM       Lab Results   Component Value Date    INR 1.12 01/18/2022    INR 1.09 08/13/2020         No results found for: LABA1C    Lab Results   Component Value Date/Time    TRIG 124 08/13/2020 06:59 AM    HDL 51 08/13/2020 06:59 AM    LDLCALC 62 08/13/2020 06:59 AM       Lab Results   Component Value Date/Time    TSH 2.58 03/08/2021 12:00 AM         Testing Reviewed:      I have individually reviewed the cardiac test below:    ECHO: Results for orders placed during the hospital encounter of 01/18/22    Echo 2D w doppler w color complete    Narrative  Transthoracic Echocardiography Report (TTE)    Demographics    Patient Name   Niesha Maria  Gender               Female  H    MR #           [id number]     Race                     Ethnicity    Account #      [de-identified]     Room Number          0009    Accession      [id number]    Date of Study        01/18/2022  Number    Date of Birth  1945    Referring Physician  Rachelle Ryder MD    Age            77 year(s)    Chrystal Reid MD  Physician    Procedure    Type of Study    TTE procedure:ECHOCARDIOGRAM COMPLETE 2D W DOPPLER W COLOR. Procedure Date  Date: 01/18/2022 Start: 10:06 AM    Study Location: Echo Lab  Technical Quality: Poor visualization due to restricted mobility. Indications:Chest pain.     Additional Medical History:Hypertension, Stents, Coronary artery disease,  Chronic kidney disease, Hyperlipidemia    Patient Status: Routine    Height: 64 inches Weight: 180 pounds BSA: 1.87 m^2 BMI: 30.9 kg/m^2    BP: 98/58 mmHg    Allergies  - See Epic. - Codeine. Conclusions    Summary  Ejection fraction was estimated at 50-55%. IVC size is within normal limits with normal respiratory phasic    Signature    ----------------------------------------------------------------  Electronically signed by Yusra Benson MD (Interpreting  physician) on 01/18/2022 at 05:56 PM  ----------------------------------------------------------------    Findings    Mitral Valve  The mitral valve structure was normal with normal leaflet separation. DOPPLER: The transmitral velocity was within the normal range with no  evidence for mitral stenosis. There was no evidence of mitral  regurgitation. Aortic Valve  The aortic valve was trileaflet with normal thickness and cuspal  separation. DOPPLER: Transaortic velocity was within the normal range with  no evidence of aortic stenosis. There was no evidence of aortic  regurgitation. Tricuspid Valve  The tricuspid valve structure was normal with normal leaflet separation. DOPPLER: There was no evidence of tricuspid stenosis. There was no  evidence of tricuspid regurgitation. Pulmonic Valve  The pulmonic valve leaflets were not well seen. DOPPLER: The transpulmonic  velocity was within the normal range with no evidence for regurgitation. Left Atrium  Left atrial size was normal.    Left Ventricle  Normal left ventricular size and systolic function. There were no regional wall motion abnormalities. Wall thickness was within normal limits. Ejection fraction was estimated at 50-55%. Right Atrium  Right atrial size was normal.    Right Ventricle  The right ventricular size was normal with normal systolic function and  wall thickness. Pericardial Effusion  The pericardium was normal in appearance with no evidence of a pericardial  effusion. Pleural Effusion  No evidence of pleural effusion.     Aorta / Great Vessels  IVC size is within normal limits with normal respiratory phasic changes. M-Mode/2D Measurements & Calculations    LV Diastolic    LV Systolic Dimension: 2.7  AV Cusp Separation: 2.4 cmLA  Dimension: 3.8  cm                          Dimension: 4.4 cmAO Root  cm              LV Volume Diastolic: 05.78  Dimension: 3.8 cm  LV FS:29 %      ml  LV PW           LV Volume Systolic: 27 ml  Diastolic: 1.1  LV EDV/LV EDV Index: 58.45  cm              ml/31 m^2LV ESV/LV ESV      RV Diastolic Dimension: 2.2 cm  Septum          Index: 27 BW/21 m^2  Diastolic: 1.1  EF Calculated: 53.8 %       LA/Aorta: 1.16  cm    Doppler Measurements & Calculations    MV Peak E-Wave: 42.4 cm/s  AV Peak Velocity: 98.4 LVOT Peak Velocity: 70.7  MV Peak A-Wave: 52.7 cm/s  cm/s                   cm/s  MV E/A Ratio: 0.8          AV Peak Gradient: 3.87 LVOT Peak Gradient: 2  MV Peak Gradient: 0.72     mmHg                   mmHg  mmHg  TV Peak E-Wave: 32.1  MV Deceleration Time: 239                         cm/s  msec                                              TV Peak A-Wave: 49.3  MV P1/2t: 70 msec                                 cm/s  MVA by PHT:3.14 cm^2  TV Peak Gradient: 0.41  MV E' Septal Velocity: 4.7 AV DVI (Vmax):0.72     mmHg  cm/s  MV A' Septal Velocity: 9.4                        PV Peak Velocity: 63  cm/s                                              cm/s  MV E' Lateral Velocity:                           PV Peak Gradient: 1.59  6.6 cm/s                                          mmHg  MV A' Lateral Velocity:  8.9 cm/s  E/E' septal: 9.02  E/E' lateral: 6.42    [email or web address] [id number]       Assessment/Plan   Fatigue  Fogginess  PDA stenosis s/p PCI x 2 QUINCY  Preserved EF  Chronic LE edema - compression stockings  HTN  She is on OMT, no major volume. She needs to watch her salt and fluid intake. She has no chest pain.   She is using Lasix prn in addition to stable doses of Lasix. Will refer to [facility] for general assessment - dementia? Malignancy screens? Osteoporosis? .  CHF clinic following. Discussed diet/exercise/BP/weight loss/health lifestyle choices/lipids; the patient understands the goals and will try to comply.     Disposition:  1 year         Electronically signed by Alec Cornejo MD   7/11/2022 at 10:42 AM EDT

## 2022-07-19 ENCOUNTER — OFFICE VISIT (OUTPATIENT)
Dept: CARDIOLOGY CLINIC | Age: 77
End: 2022-07-19
Payer: MEDICARE

## 2022-07-19 VITALS
HEIGHT: 64 IN | SYSTOLIC BLOOD PRESSURE: 130 MMHG | TEMPERATURE: 70 F | DIASTOLIC BLOOD PRESSURE: 70 MMHG | OXYGEN SATURATION: 95 % | WEIGHT: 184.5 LBS | BODY MASS INDEX: 31.5 KG/M2

## 2022-07-19 DIAGNOSIS — I25.10 CORONARY ARTERY DISEASE INVOLVING NATIVE CORONARY ARTERY OF NATIVE HEART WITHOUT ANGINA PECTORIS: ICD-10-CM

## 2022-07-19 DIAGNOSIS — I50.32 CHF NYHA CLASS II, CHRONIC, DIASTOLIC (HCC): Primary | ICD-10-CM

## 2022-07-19 DIAGNOSIS — I10 ESSENTIAL HYPERTENSION: ICD-10-CM

## 2022-07-19 PROCEDURE — 99214 OFFICE O/P EST MOD 30 MIN: CPT | Performed by: NURSE PRACTITIONER

## 2022-07-19 PROCEDURE — 1123F ACP DISCUSS/DSCN MKR DOCD: CPT | Performed by: NURSE PRACTITIONER

## 2022-07-19 ASSESSMENT — ENCOUNTER SYMPTOMS
COUGH: 0
ABDOMINAL DISTENTION: 0
SHORTNESS OF BREATH: 0

## 2022-07-19 NOTE — PROGRESS NOTES
Heart Failure Clinic       Visit Date: 7/19/2022  Cardiologist:  Dr. Ann Quintana  Primary Care Physician: Dr. Heather Singh MD    Roger Osei is a 77 y.o. female who presents today for:  Chief Complaint   Patient presents with    Congestive Heart Failure       HPI:   Roger Osei is a 77 y.o. female who presents to the office for a follow up patient visit in the heart failure clinic. Accompanied by no one    TYPE HF: Diastolic (EF 71-76%)  Device: no  HX: HTN, HLD,CAD (PCI, 1/2022), Renal stone Pablo Knows), significant family hx Cancers  ? ?GENNARO had sleep study in past - not tolerate mask     Dry Wt: 175-180     Hospitalization:  None  New pt to [facility] in June  LHC/RHC in August - elevated pressures/volume overload - started on Lasix 40 BID   Referred to CHF clinic  OV 9/14/20 - added Aldactone  OV [place] 1/14/21 = no changes    1/2022 - worsening CP, HORTON - ECHO done - no changes  L/RHC - PCI to PDA. RH normal pressures. Referred to Pulmonary - saw Pulmonary started on Advair. Referred to Neuro for memory issues - seeing in Sept    Today: some knee pain limiting but better, got cortisone shots   Gets around a lot  Walked from parking lot - goes [employer]. 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 Notes some memory issues - seeing Neuro in 2 months. Still driving but not long distances (friend came today)  Takes extra Lasix here/there for MONTRELL - effective  Goes to [employer] several times week - for hour so. Thinks she may have Breast CA - has had workup - negative.   Notes tenderness    Patient has:  Chest Pain: occasionally - SL Ntg not help  SOB: no  Orthopnea/PND: no  GENNARO: no  Edema: on/off   Fatigue: no  Abdominal bloating: no  Cough: no  Appetite: good  Home weight: stable  Home blood pressure: stable    Past Medical History:   Diagnosis Date    CAD (coronary artery disease)     PCI     Chronic kidney disease     stones    Hyperlipidemia     Hypertension      Past Surgical History:   Procedure Laterality Date    APPENDECTOMY      CAROTID STENT PLACEMENT      CHOLECYSTECTOMY      COLONOSCOPY      CYSTO/URETERO/PYELOSCOPY, CALCULUS TX N/A 5/8/2019    CYSTO, LEFT URETEROSCOPY, URETERORENOSCOPY, LASER LITOHTRIPSY, BASKET RETRIEVAL OF STONE FRAGMENTS, LEFT URETERAL STENT performed by Silvia Mcnair MD at [facility] ([address] [place] [address])      age 43    HYSTERECTOMY, VAGINAL      KIDNEY STONE SURGERY      OVARY REMOVAL       Family History   Problem Relation Age of Onset    Cancer Mother     Heart Disease Father     Breast Cancer Sister 54    Cancer Brother     Breast Cancer Sister 61    Cancer Sister     Cancer Sister     Cancer Sister     Cancer Sister     Cancer Sister     Breast Cancer Niece 62     Social History     Tobacco Use    Smoking status: Never    Smokeless tobacco: Never   Substance Use Topics    Alcohol use: Not Currently     Current Outpatient Medications   Medication Sig Dispense Refill    isosorbide mononitrate (IMDUR) 30 MG extended release tablet Take 2 tablets by mouth 2 times daily 180 tablet 1    furosemide (LASIX) 40 MG tablet Take 1 tablet by mouth 2 times daily AND as needed for wt gain, swelling 210 tablet 3    ADVAIR -21 MCG/ACT inhaler Inhale 2 puffs into the lungs 2 times daily 1 each 11    albuterol sulfate HFA (VENTOLIN HFA) 108 (90 Base) MCG/ACT inhaler Inhale 2 puffs into the lungs every 6 hours as needed for Wheezing or Shortness of Breath 1 each 3    potassium chloride (KLOR-CON) 10 MEQ extended release tablet Take 1 tablet by mouth twice daily 90 tablet 3    metoprolol tartrate (LOPRESSOR) 25 MG tablet Take 1 tablet by mouth 2 times daily 180 tablet 3    nitroGLYCERIN (NITROSTAT) 0.4 MG SL tablet Place 1 tablet under the tongue every 5 minutes as needed for Chest pain (Maximum 3 doses) 25 tablet 1    clopidogrel (PLAVIX) 75 MG tablet Take 1 tablet by mouth daily 90 tablet 3    simvastatin (ZOCOR) 10 MG tablet Take 1 tablet by mouth nightly 90 tablet 3    spironolactone (ALDACTONE) 25 MG tablet Take 1 tablet by mouth daily 90 tablet 3     No current facility-administered medications for this visit. Allergies   Allergen Reactions    Codeine Other (See Comments)     Breathing difficulties       SUBJECTIVE:   Review of Systems   Constitutional:  Positive for fatigue (stable). Negative for activity change and appetite change. Respiratory:  Negative for cough and shortness of breath. Cardiovascular:  Positive for chest pain (on/off) and leg swelling (on/off). Negative for palpitations. Gastrointestinal:  Negative for abdominal distention. Neurological:  Negative for weakness, light-headedness and headaches. Hematological:  Negative for adenopathy. Psychiatric/Behavioral:  Negative for sleep disturbance. OBJECTIVE:   Today's Vitals:  /70   Temp (!) 70 °F (21.1 °C)   Ht 5' 4\" (1.626 m)   Wt 184 lb 8 oz (83.7 kg)   SpO2 95%   BMI 31.67 kg/m²     Physical Exam  Vitals reviewed. Constitutional:       General: She is not in acute distress. Appearance: Normal appearance. She is well-developed. She is not diaphoretic. HENT:      Head: Normocephalic and atraumatic. Eyes:      Conjunctiva/sclera: Conjunctivae normal.   Neck:      Comments: No JVD  Cardiovascular:      Rate and Rhythm: Normal rate and regular rhythm. Heart sounds: Normal heart sounds. No murmur heard. Pulmonary:      Effort: Pulmonary effort is normal. No respiratory distress. Breath sounds: Normal breath sounds. No wheezing or rales. Abdominal:      General: Bowel sounds are normal. There is no distension. Palpations: Abdomen is soft. Tenderness: There is no abdominal tenderness. Musculoskeletal:         General: Normal range of motion. Cervical back: Normal range of motion and neck supple. Right lower leg: No edema. Left lower leg: No edema. Skin:     General: Skin is warm and dry. Capillary Refill: Capillary refill takes less than 2 seconds. Neurological:      Mental Status: She is alert and oriented to person, place, and time. Coordination: Coordination normal.   Psychiatric:         Behavior: Behavior normal.     Wt Readings from Last 3 Encounters:   07/19/22 184 lb 8 oz (83.7 kg)   07/11/22 188 lb (85.3 kg)   06/07/22 185 lb (83.9 kg)     BP Readings from Last 3 Encounters:   07/19/22 130/70   07/11/22 122/62   06/07/22 112/72     Pulse Readings from Last 3 Encounters:   07/11/22 64   06/07/22 64   02/17/22 66     Body mass index is 31.67 kg/m². ECHO   Summary   Ejection fraction was estimated at 50-55%. IVC size is within normal limits with normal respiratory phasic      Signature      ----------------------------------------------------------------   Electronically signed by Luma Nicolas MD (Interpreting   physician) on 01/18/2022 at 05:56 PM      ECHO:     Summary   Ejection fraction was estimated at 50-55%. E/A flow reversal noted. Suggestive of diastolic. Ascending aorta = 3.6 cm. IVC size is within normal limits with normal respiratory phasic changes. Signature      ----------------------------------------------------------------   Electronically signed by Luma Nicolas MD (Interpreting   physician) on 06/22/2020 at 04:15 PM   ----------------------------------------------------------------    CATH 1/2022  RIGHT HEART CATHETERIZATION:  RA 4, RV 26/3, PA 23/9 with a mean of 14,  wedge pressure 5, PA saturation 73%. SUMMARY:  Successful PCI of the PDA with two overlapping drug-eluting  stents; euvolemic right heart cath pressures with preserved cardiac  output. PLAN:  1. Bedrest.  2.  Optimal medical therapy. 3.  Risk factor management. 4.  Routine access site care. 5.  DAPT. 6.  Guideline-directed therapy for CAD. 7.  Guideline-directed therapy for CHF. 8.  Follow up in the office in two to four weeks postprocedure.      All the above were 0.9 01/19/2022 05:35 AM    LABGLOM 61 01/19/2022 05:35 AM    GLUCOSE 90 01/19/2022 05:35 AM    CALCIUM 8.1 01/19/2022 05:35 AM     Hepatic Function Panel:    Lab Results   Component Value Date/Time    ALKPHOS 53 08/13/2020 06:59 AM    ALT 8 08/13/2020 06:59 AM    AST 12 08/13/2020 06:59 AM    PROT 6.0 08/13/2020 06:59 AM    BILITOT 0.4 08/13/2020 06:59 AM    LABALBU 3.8 08/13/2020 06:59 AM     Magnesium:    Lab Results   Component Value Date/Time    MG 2.0 11/19/2020 10:16 AM     PT/INR:    Lab Results   Component Value Date/Time    INR 1.12 01/18/2022 11:00 AM     Lipids:    Lab Results   Component Value Date/Time    TRIG 124 08/13/2020 06:59 AM    HDL 51 08/13/2020 06:59 AM    LDLCALC 62 08/13/2020 06:59 AM       ASSESSMENT AND PLAN:      Diagnosis Orders   1. CHF NYHA class II, chronic, diastolic (HCC)  Basic Metabolic Panel      2. Coronary artery disease involving native coronary artery of native heart without angina pectoris        3. Essential hypertension          Continue:  GDMT:              ACE/ARB/ARNI - no              BB - Lopressor 25 BID              Diuretic - Lasix 40 BID, Kcl 10 BID              Vasodilator - Imdur 60 BID               MRA - Aldactone 25/day  Digoxin - no              Antiarrhythmic - no   Other - Plavix  Diastolic (13%)  CAD s/p PCI (1/2022)  HTN  Stable, appears Euvolemic  Lab reviewed - stable  Repeat blood work in 1 month   BP/HR stable   No med changes today   Continue diet/fluid adherence  Continue daily wts. F/U w/ Neuro for memory issues. F/U w/ Pulmonary   F/U w/ PCP for breast CA surveillance  F/U w/ Cardiology  F/U in clinic in 6 months      Tolerating above noted HF meds, no ill side effects noted. Will continue to monitor kidney function and electrolytes. Will optimize as tolerated. Pt is compliant w/ medications.     Total visit time of 30 minutes has been spent with patient on education of symptoms, management, medication, and plan of care; as well as review of chart: labs, ECHO, radiology reports, etc.   I personally spent more then 50% of the appt time face to face with the patient. Daily weights  Fluid restriction of 2 Liters per day  Limit sodium in diet to around 9975-5290 mg/day  Monitor BP  Activity as tolerated     Patient was instructed to call the [facility] for any changes in symptoms as noted in AVS.      No follow-ups on file. or sooner if needed     Patient given educational materials - see patient instructions. We discussed the importance of weighing oneself and recording daily. We also discussed the importance of a low sodium diet, higher sodium foods to avoid and better low sodium food options. Patient verbalizes understanding of plan of care using teach back method, and is agreeable to the treatment plan.        Electronically signed by NASIM Heller CNP on 7/19/2022 at 2:25 PM

## 2022-07-19 NOTE — PATIENT INSTRUCTIONS
You may receive a survey regarding the care you received during your visit. Your input is valuable to us. We encourage you to complete and return your survey. We hope you will choose us in the future for your healthcare needs.     Continue:  Continue current medications  Daily weights and record  Fluid restriction of 2 Liters per day  Limit sodium in diet to around 6733-1790 mg/day  Monitor BP  Activity as tolerated     Call the Heart Failure Clinic for any of the following symptoms: [phone]  Weight gain of 2-3 pounds in 1 day or 5 pounds in 1 week  Increased shortness of breath  Shortness of breath while laying down  Cough  Chest pain  Swelling in feet, ankles or legs  Tenderness or bloating in the abdomen  Fatigue   Decreased appetite or nausea   Confusion

## 2022-07-22 ENCOUNTER — HOSPITAL ENCOUNTER (OUTPATIENT)
Dept: PULMONOLOGY | Age: 77
Discharge: HOME | End: 2022-07-22
Payer: MEDICARE

## 2022-07-22 ENCOUNTER — HOSPITAL ENCOUNTER (OUTPATIENT)
Dept: CT IMAGING | Age: 77
Discharge: HOME | End: 2022-07-22
Payer: MEDICARE

## 2022-07-22 DIAGNOSIS — R06.02 SOB (SHORTNESS OF BREATH): ICD-10-CM

## 2022-07-22 DIAGNOSIS — J45.20 MILD INTERMITTENT ASTHMA, UNSPECIFIED WHETHER COMPLICATED: ICD-10-CM

## 2022-07-22 PROCEDURE — 6360000002 HC RX W HCPCS

## 2022-07-22 PROCEDURE — 94070 EVALUATION OF WHEEZING: CPT

## 2022-07-22 PROCEDURE — 95070 INHLJ BRNCL CHALLENGE TSTG: CPT

## 2022-07-22 PROCEDURE — 94010 BREATHING CAPACITY TEST: CPT

## 2022-07-22 PROCEDURE — 94729 DIFFUSING CAPACITY: CPT

## 2022-07-22 PROCEDURE — 71250 CT THORAX DX C-: CPT

## 2022-07-22 PROCEDURE — 94726 PLETHYSMOGRAPHY LUNG VOLUMES: CPT

## 2022-07-28 ENCOUNTER — OFFICE VISIT (OUTPATIENT)
Dept: PULMONOLOGY | Age: 77
End: 2022-07-28
Payer: MEDICARE

## 2022-07-28 VITALS
HEIGHT: 64 IN | HEART RATE: 78 BPM | WEIGHT: 184 LBS | SYSTOLIC BLOOD PRESSURE: 122 MMHG | DIASTOLIC BLOOD PRESSURE: 72 MMHG | TEMPERATURE: 98 F | OXYGEN SATURATION: 98 % | BODY MASS INDEX: 31.41 KG/M2

## 2022-07-28 DIAGNOSIS — K21.9 GASTROESOPHAGEAL REFLUX DISEASE, UNSPECIFIED WHETHER ESOPHAGITIS PRESENT: ICD-10-CM

## 2022-07-28 DIAGNOSIS — J45.20 MILD INTERMITTENT ASTHMA, UNSPECIFIED WHETHER COMPLICATED: ICD-10-CM

## 2022-07-28 DIAGNOSIS — R06.02 SOB (SHORTNESS OF BREATH): Primary | ICD-10-CM

## 2022-07-28 PROCEDURE — 94010 BREATHING CAPACITY TEST: CPT | Performed by: NURSE PRACTITIONER

## 2022-07-28 PROCEDURE — 1123F ACP DISCUSS/DSCN MKR DOCD: CPT | Performed by: NURSE PRACTITIONER

## 2022-07-28 PROCEDURE — 95012 NITRIC OXIDE EXP GAS DETER: CPT | Performed by: NURSE PRACTITIONER

## 2022-07-28 PROCEDURE — 99214 OFFICE O/P EST MOD 30 MIN: CPT | Performed by: NURSE PRACTITIONER

## 2022-07-28 RX ORDER — FLUTICASONE FUROATE AND VILANTEROL 100; 25 UG/1; UG/1
1 POWDER RESPIRATORY (INHALATION) DAILY
Qty: 1 EACH | Refills: 11 | Status: SHIPPED | OUTPATIENT
Start: 2022-07-28 | End: 2022-08-18

## 2022-07-28 RX ORDER — PANTOPRAZOLE SODIUM 40 MG/1
40 TABLET, DELAYED RELEASE ORAL DAILY
Qty: 90 TABLET | Refills: 3 | Status: SHIPPED | OUTPATIENT
Start: 2022-07-28 | End: 2022-07-28

## 2022-07-28 RX ORDER — PANTOPRAZOLE SODIUM 40 MG/1
40 TABLET, DELAYED RELEASE ORAL DAILY
Qty: 90 TABLET | Refills: 3 | Status: SHIPPED | OUTPATIENT
Start: 2022-07-28 | End: 2023-07-28

## 2022-07-28 ASSESSMENT — ENCOUNTER SYMPTOMS
ALLERGIC/IMMUNOLOGIC NEGATIVE: 1
WHEEZING: 0
COUGH: 0
SHORTNESS OF BREATH: 1
GASTROINTESTINAL NEGATIVE: 1
EYES NEGATIVE: 1

## 2022-07-28 NOTE — PROGRESS NOTES
[facility]    Patient: Argentina Snow, 77 y.o.   : 1945      Subjective     Chief Complaint   Patient presents with    Follow-up     1 month with HRCT, PFT and MCCT on         HPI  Malvin Hollis is here for follow up for SOB bronchial challenge and PFT to review  NIOX done last visit elevated at  38 started on Advair   No home oxygen use   Not using Advair was too expensive  Continues SOB mainly with exertion  Slight chest pain at times   ? Heartburn   HRCT reviewed with no significant changes - no honeycombing   States she has had these issues for years     Progress History:   Since last visit any new medical issues? No  New ER or hospital visits? No  Any new or changes in medicines? No  Using inhalers? No  Are they helpful?  No    Past Medical hx   PMH:  Past Medical History:   Diagnosis Date    CAD (coronary artery disease)     PCI     Chronic kidney disease     stones    Hyperlipidemia     Hypertension      SURGICAL HISTORY:  Past Surgical History:   Procedure Laterality Date    APPENDECTOMY      CAROTID STENT PLACEMENT      CHOLECYSTECTOMY      COLONOSCOPY      CYSTO/URETERO/PYELOSCOPY, CALCULUS TX N/A 2019    CYSTO, LEFT URETEROSCOPY, URETERORENOSCOPY, LASER LITOHTRIPSY, BASKET RETRIEVAL OF STONE FRAGMENTS, LEFT URETERAL STENT performed by Chauncey Parsons MD at [address] [place] [address] ([address] [place] [address])      age 37    HYSTERECTOMY, VAGINAL      KIDNEY STONE SURGERY      OVARY REMOVAL       SOCIAL HISTORY:  Social History     Tobacco Use    Smoking status: Never    Smokeless tobacco: Never   Vaping Use    Vaping Use: Never used   Substance Use Topics    Alcohol use: Not Currently    Drug use: Never     ALLERGIES:  Allergies   Allergen Reactions    Codeine Other (See Comments)     Breathing difficulties     FAMILY HISTORY:  Family History   Problem Relation Age of Onset    Cancer Mother     Heart Disease Father     Breast Cancer Sister 54 Cancer Brother     Breast Cancer Sister 61    Cancer Sister     Cancer Sister     Cancer Sister     Cancer Sister     Cancer Sister     Breast Cancer Niece 62     CURRENT MEDICATIONS:  Current Outpatient Medications   Medication Sig Dispense Refill    fluticasone-vilanterol (BREO ELLIPTA) 100-25 MCG/INH AEPB inhaler Inhale 1 puff into the lungs in the morning. 1 each 11    pantoprazole (PROTONIX) 40 MG tablet Take 1 tablet by mouth in the morning. 30 minutes prior to meal  Diagnosis: GERD, chronic cough. 90 tablet 3    isosorbide mononitrate (IMDUR) 30 MG extended release tablet Take 2 tablets by mouth 2 times daily 180 tablet 1    furosemide (LASIX) 40 MG tablet Take 1 tablet by mouth 2 times daily AND as needed for wt gain, swelling 210 tablet 3    albuterol sulfate HFA (VENTOLIN HFA) 108 (90 Base) MCG/ACT inhaler Inhale 2 puffs into the lungs every 6 hours as needed for Wheezing or Shortness of Breath 1 each 3    potassium chloride (KLOR-CON) 10 MEQ extended release tablet Take 1 tablet by mouth twice daily 90 tablet 3    metoprolol tartrate (LOPRESSOR) 25 MG tablet Take 1 tablet by mouth 2 times daily 180 tablet 3    nitroGLYCERIN (NITROSTAT) 0.4 MG SL tablet Place 1 tablet under the tongue every 5 minutes as needed for Chest pain (Maximum 3 doses) 25 tablet 1    clopidogrel (PLAVIX) 75 MG tablet Take 1 tablet by mouth daily 90 tablet 3    simvastatin (ZOCOR) 10 MG tablet Take 1 tablet by mouth nightly 90 tablet 3    spironolactone (ALDACTONE) 25 MG tablet Take 1 tablet by mouth daily 90 tablet 3     No current facility-administered medications for this visit. ROS   Review of Systems   Constitutional:  Positive for fatigue. Negative for chills and fever. HENT: Negative. Negative for congestion. Eyes: Negative. Respiratory:  Positive for shortness of breath. Negative for cough and wheezing. Cardiovascular:  Positive for chest pain (intermittent) and leg swelling. Gastrointestinal: Negative. Endocrine: Negative. Genitourinary: Negative. Musculoskeletal: Negative. Allergic/Immunologic: Negative. Neurological: Negative. Hematological: Negative. Psychiatric/Behavioral:  Negative for sleep disturbance. The patient is nervous/anxious. Physical exam   /72 (Site: Left Upper Arm, Position: Sitting, Cuff Size: Medium Adult)   Pulse 78   Temp 98 °F (36.7 °C) (Skin)   Ht 5' 4\" (1.626 m)   Wt 184 lb (83.5 kg)   SpO2 98%   BMI 31.58 kg/m²    Wt Readings from Last 3 Encounters:   07/28/22 184 lb (83.5 kg)   07/19/22 184 lb 8 oz (83.7 kg)   07/11/22 188 lb (85.3 kg)       Physical Exam  Vitals and nursing note reviewed. Constitutional:       Appearance: She is well-developed. She is obese. HENT:      Head: Normocephalic and atraumatic. Eyes:      Conjunctiva/sclera: Conjunctivae normal.      Pupils: Pupils are equal, round, and reactive to light. Neck:      Vascular: No JVD. Cardiovascular:      Rate and Rhythm: Normal rate and regular rhythm. Heart sounds: Normal heart sounds. No murmur heard. No friction rub. No gallop. Pulmonary:      Effort: Pulmonary effort is normal. No respiratory distress. Breath sounds: Normal breath sounds. No wheezing or rales. Abdominal:      General: Bowel sounds are normal.      Palpations: Abdomen is soft. Musculoskeletal:         General: Normal range of motion. Cervical back: Normal range of motion and neck supple. Skin:     General: Skin is warm and dry. Capillary Refill: Capillary refill takes less than 2 seconds. Neurological:      Mental Status: She is alert and oriented to person, place, and time. Psychiatric:         Mood and Affect: Mood is anxious. Behavior: Behavior normal.         Thought Content:  Thought content normal.         Judgment: Judgment normal.        results   Lung Nodule Screening     [] Qualifies    [x] Does not qualify   [] Declined    [] Completed    The USPSTF recommends annual screening for lung cancer with low-dose computed tomography (LDCT) in adults aged 48 to [de-identified] years who have a 30 pack-year smoking history and currently smoke or have quit within the past 20 years. Screening should be discontinued once a person has not smoked for 20 years or develops a health problem that substantially limits life expectancy or the ability or willingness to have curative lung surgery. 7/22/22  PFT done slight restriction seen. Normal lung volumes and DLCO          7/22/2022     CT CHEST HIGH RESOLUTION     FINDINGS: Heart/mediastinum: The thyroid gland is unremarkable. The heart size is normal. Coronary artery calcifications are observed. The aorta is not dilated. No mediastinal, hilar, or axillary lymphadenopathy is observed. Lungs: Elevation of the right hemidiaphragm and moderate right lower lobe atelectasis is unchanged. Mild dependent atelectasis/scarring at the left lung base is also stable. A right azygos lobe variant is present. Numerous scattered benign calcified granuloma are observed. A 3 mm left lower lobe pulmonary nodule is stable (series 3, image 32). No suspicious pulmonary mass or nodules observed. No honeycombing or groundglass opacities are visualized. Upper abdomen: The unopacified pulmonary arteries are normal.   Musculoskeletal: The unopacified thoracic aorta is normal.     Elevation of the right hemidiaphragm and atelectatic changes in the right lower lobe are stable when compared to examinations dating back to 2019. Mild bronchiectasis at the lung bases and dependent atelectasis/scarring, right greater than left is unchanged. Scattered benign calcified granuloma are stable. Tiny noncalcified left lower lobe pulmonary nodule is also unchanged. No honeycombing is visualized. No acute intrathoracic process is observed. Assessment      Diagnosis Orders   1. SOB (shortness of breath)        2.  Mild intermittent asthma, unspecified whether complicated  POCT Nitric Oxide    fluticasone-vilanterol (BREO ELLIPTA) 100-25 MCG/INH AEPB inhaler      3.  Gastroesophageal reflux disease, unspecified whether esophagitis present  pantoprazole (PROTONIX) 40 MG tablet            Plan   -PFT reviewed slight restriction seen  -Methacholine Challenge reviewed with (-) findings   -HRCT reviewed with no significant changes or new findings   -NIOX repeated today 57 bbp higher than last but patient was not using ICS  -Discussed possible GERD will start Protonix 40 mg PO daily before breakfast   -RX done for Breo 100-25 mcg 1 puff daily rinse afterwards   -Advised to maintain pneumonia vaccine with PCP and to take flu vaccine this coming season.  -Advised patient to call office with any changes, questions, or concerns regarding respiratory status  -Weight loss encouraged   -NIOX next visit    Will see Chip Villanueva back in: 3 months    Waldo Richardson  7/28/2022

## 2022-07-28 NOTE — PROGRESS NOTES
Patient is experiencing SOB: yes, with exertion    Patient is experiencing wheezing: No    Patient states they have had a Absent = 4 cough. Phlegm is occasional    Patient is coughing up blood: no    Patient has been experiencing chest pains: every once in awhile    Patient is currently taking the following inhaler(s): Ventolin,   Patient is using their rescue inhaler rarely. Other: Advair was too expensive, never had it.

## 2022-08-18 ENCOUNTER — OFFICE VISIT (OUTPATIENT)
Dept: INTERNAL MEDICINE CLINIC | Age: 77
End: 2022-08-18
Payer: MEDICARE

## 2022-08-18 ENCOUNTER — NURSE ONLY (OUTPATIENT)
Dept: LAB | Age: 77
End: 2022-08-18

## 2022-08-18 VITALS
DIASTOLIC BLOOD PRESSURE: 76 MMHG | TEMPERATURE: 97.6 F | HEART RATE: 90 BPM | OXYGEN SATURATION: 96 % | SYSTOLIC BLOOD PRESSURE: 120 MMHG | BODY MASS INDEX: 31.41 KG/M2 | WEIGHT: 183 LBS

## 2022-08-18 DIAGNOSIS — E66.9 CLASS 1 OBESITY WITH SERIOUS COMORBIDITY AND BODY MASS INDEX (BMI) OF 30.0 TO 30.9 IN ADULT, UNSPECIFIED OBESITY TYPE: ICD-10-CM

## 2022-08-18 DIAGNOSIS — I50.9 CHF (NYHA CLASS II, ACC/AHA STAGE C) (HCC): ICD-10-CM

## 2022-08-18 DIAGNOSIS — G47.33 OSA (OBSTRUCTIVE SLEEP APNEA): ICD-10-CM

## 2022-08-18 DIAGNOSIS — J45.21 MILD INTERMITTENT ASTHMA WITH ACUTE EXACERBATION: ICD-10-CM

## 2022-08-18 DIAGNOSIS — I10 PRIMARY HYPERTENSION: ICD-10-CM

## 2022-08-18 DIAGNOSIS — I10 PRIMARY HYPERTENSION: Primary | ICD-10-CM

## 2022-08-18 DIAGNOSIS — N18.2 CKD (CHRONIC KIDNEY DISEASE) STAGE 2, GFR 60-89 ML/MIN: ICD-10-CM

## 2022-08-18 LAB
ANION GAP SERPL CALCULATED.3IONS-SCNC: 13 MEQ/L (ref 8–16)
AVERAGE GLUCOSE: 105 MG/DL (ref 70–126)
BUN BLDV-MCNC: 25 MG/DL (ref 7–22)
CALCIUM SERPL-MCNC: 9.4 MG/DL (ref 8.5–10.5)
CHLORIDE BLD-SCNC: 104 MEQ/L (ref 98–111)
CO2: 28 MEQ/L (ref 23–33)
CREAT SERPL-MCNC: 1.3 MG/DL (ref 0.4–1.2)
ERYTHROCYTE [DISTWIDTH] IN BLOOD BY AUTOMATED COUNT: 13.5 % (ref 11.5–14.5)
ERYTHROCYTE [DISTWIDTH] IN BLOOD BY AUTOMATED COUNT: 51.8 FL (ref 35–45)
GFR SERPL CREATININE-BSD FRML MDRD: 40 ML/MIN/1.73M2
GLUCOSE BLD-MCNC: 112 MG/DL (ref 70–108)
HBA1C MFR BLD: 5.5 % (ref 4.4–6.4)
HCT VFR BLD CALC: 44.5 % (ref 37–47)
HEMOGLOBIN: 14.5 GM/DL (ref 12–16)
MCH RBC QN AUTO: 33.5 PG (ref 26–33)
MCHC RBC AUTO-ENTMCNC: 32.6 GM/DL (ref 32.2–35.5)
MCV RBC AUTO: 102.8 FL (ref 81–99)
PLATELET # BLD: 187 THOU/MM3 (ref 130–400)
PMV BLD AUTO: 9.5 FL (ref 9.4–12.4)
POTASSIUM SERPL-SCNC: 4.5 MEQ/L (ref 3.5–5.2)
RBC # BLD: 4.33 MILL/MM3 (ref 4.2–5.4)
SODIUM BLD-SCNC: 145 MEQ/L (ref 135–145)
TSH SERPL DL<=0.05 MIU/L-ACNC: 2.34 UIU/ML (ref 0.4–4.2)
WBC # BLD: 6.2 THOU/MM3 (ref 4.8–10.8)

## 2022-08-18 PROCEDURE — G8400 PT W/DXA NO RESULTS DOC: HCPCS

## 2022-08-18 PROCEDURE — 99204 OFFICE O/P NEW MOD 45 MIN: CPT

## 2022-08-18 PROCEDURE — 1090F PRES/ABSN URINE INCON ASSESS: CPT

## 2022-08-18 PROCEDURE — G8417 CALC BMI ABV UP PARAM F/U: HCPCS

## 2022-08-18 PROCEDURE — 1036F TOBACCO NON-USER: CPT

## 2022-08-18 PROCEDURE — G8427 DOCREV CUR MEDS BY ELIG CLIN: HCPCS

## 2022-08-18 PROCEDURE — 1123F ACP DISCUSS/DSCN MKR DOCD: CPT

## 2022-08-18 PROCEDURE — 3017F COLORECTAL CA SCREEN DOC REV: CPT

## 2022-08-18 SDOH — ECONOMIC STABILITY: FOOD INSECURITY: WITHIN THE PAST 12 MONTHS, THE FOOD YOU BOUGHT JUST DIDN'T LAST AND YOU DIDN'T HAVE MONEY TO GET MORE.: NEVER TRUE

## 2022-08-18 SDOH — ECONOMIC STABILITY: FOOD INSECURITY: WITHIN THE PAST 12 MONTHS, YOU WORRIED THAT YOUR FOOD WOULD RUN OUT BEFORE YOU GOT MONEY TO BUY MORE.: NEVER TRUE

## 2022-08-18 ASSESSMENT — ENCOUNTER SYMPTOMS
ABDOMINAL PAIN: 0
NAUSEA: 0
VOMITING: 0
COUGH: 1
COLOR CHANGE: 1
DIARRHEA: 0
SHORTNESS OF BREATH: 1
ABDOMINAL DISTENTION: 0
CHEST TIGHTNESS: 0
CHOKING: 1

## 2022-08-18 ASSESSMENT — PATIENT HEALTH QUESTIONNAIRE - PHQ9
SUM OF ALL RESPONSES TO PHQ QUESTIONS 1-9: 0
1. LITTLE INTEREST OR PLEASURE IN DOING THINGS: 0
SUM OF ALL RESPONSES TO PHQ QUESTIONS 1-9: 0
2. FEELING DOWN, DEPRESSED OR HOPELESS: 0
SUM OF ALL RESPONSES TO PHQ QUESTIONS 1-9: 0
SUM OF ALL RESPONSES TO PHQ9 QUESTIONS 1 & 2: 0
SUM OF ALL RESPONSES TO PHQ QUESTIONS 1-9: 0

## 2022-08-18 ASSESSMENT — SOCIAL DETERMINANTS OF HEALTH (SDOH): HOW HARD IS IT FOR YOU TO PAY FOR THE VERY BASICS LIKE FOOD, HOUSING, MEDICAL CARE, AND HEATING?: NOT HARD AT ALL

## 2022-08-18 NOTE — PROGRESS NOTES
Chief Complaint   Patient presents with    Establish Care    Hypertension    Asthma    Other     SiP Partial Thyroidectomy       HPI    46-year-old female with a PMHx of CAD, HTN, CKD stage II with a baseline GFR of 61, HLD, GENNARO not on CPAP, Asthma, arthritis, CHF NYHA class II coming into the clinic for for establishing primary care. She saw cardiology in January and had an echo done with an EF of 50-55%. She had a successful PCI of the PDA and was placed on 2 overlapping drug-eluting stents. She was recommended to have a fluid restriction of 2 L and sodium intake of 2000 mg/day. She was placed on Lopressor 25 mg BID, Lasix 40 mg twice daily, potassium tablets 10 mg twice daily, Imdur 60 mg twice daily, Aldactone 25 mg, and Plavix 75mg. She was advised to wear compression socks and does wear them daily. She was also recommended to see a pulmonologist.    She saw her pulmonologist Bharatih Gibson on 07/28/2022. She is not on home oxygen. She had symptoms of SOB on exertion with slight chest pain at times. PFTs were done which showed slight restriction but normal lung volumes and DLCO. Methacholine challenge was negative, normal.    High-resolution CT scan was done of the lungs which showed no honeycombing atelectasis in the right lower lobes which is stable since 2019. Mild bronchiectasis at the lung bases and benign calcified granuloma which is stable. Tiny noncalcified left lower lobe pulmonary nodule which is also unchanged. She was placed on Protonix 40 mg before breakfast for possible GERD symptoms. She is on Breo 100-25 mcg 1 puff daily. Breast ultrasound was also done on 3/2022. It showed no discrete cystic or solid masses. There are no sonographic abnormalities in the axilla and normal lymph node was seen. In this visit, vitals were wnl. Pt had complaints of increased urinary urgency, sometimes she is not able to make it to the bathroom.  She does state she has B/L knee pain due to arthritis, she gets cortisone shots every 3 months with complete resolution of symptoms. States her HORTON is relieved with rest, she takes albuterol prn. Last time was over a month ago. Has CP on exertion as well, symptoms did not present GERD-like. Cardiac cath was done at beginning of year, with a stent placed in the PDA. Follows with Dr. Pooja Daniel. Patient also states she sometimes have memory lapses and on one occasion she went on a drive and forgot where she was going and was driving in circles for 3 hours. Has a scheduled appointment with neurologist in September. On ROS does state of  decreased appetite, but will still eat regularly. No depression symptoms. Past Medical History:   Diagnosis Date    CAD (coronary artery disease)     PCI     Chronic kidney disease     stones    Hyperlipidemia     Hypertension        Past Surgical History:   Procedure Laterality Date    APPENDECTOMY      CAROTID STENT PLACEMENT      CHOLECYSTECTOMY      COLONOSCOPY      CYSTO/URETERO/PYELOSCOPY, CALCULUS TX N/A 5/8/2019    CYSTO, LEFT URETEROSCOPY, URETERORENOSCOPY, LASER LITOHTRIPSY, BASKET RETRIEVAL OF STONE FRAGMENTS, LEFT URETERAL STENT performed by Mya June MD at [address] [place] [address] ([address] [place] [address])      age 37    HYSTERECTOMY, VAGINAL      KIDNEY STONE SURGERY      OVARY REMOVAL         Current Outpatient Medications   Medication Sig Dispense Refill    pantoprazole (PROTONIX) 40 MG tablet Take 1 tablet by mouth in the morning. 30 minutes prior to meal. 90 tablet 3    isosorbide mononitrate (IMDUR) 30 MG extended release tablet Take 2 tablets by mouth 2 times daily (Patient taking differently: Take 60 mg by mouth 2 times daily Patient reports taking 1 tablet twice daily. ) 180 tablet 1    furosemide (LASIX) 40 MG tablet Take 1 tablet by mouth 2 times daily AND as needed for wt gain, swelling 210 tablet 3    albuterol sulfate HFA (VENTOLIN HFA) 108 (90 Base) MCG/ACT inhaler Inhale 2 puffs into the lungs every 6 hours as needed for Wheezing or Shortness of Breath 1 each 3    potassium chloride (KLOR-CON) 10 MEQ extended release tablet Take 1 tablet by mouth twice daily 90 tablet 3    metoprolol tartrate (LOPRESSOR) 25 MG tablet Take 1 tablet by mouth 2 times daily 180 tablet 3    nitroGLYCERIN (NITROSTAT) 0.4 MG SL tablet Place 1 tablet under the tongue every 5 minutes as needed for Chest pain (Maximum 3 doses) 25 tablet 1    clopidogrel (PLAVIX) 75 MG tablet Take 1 tablet by mouth daily 90 tablet 3    simvastatin (ZOCOR) 10 MG tablet Take 1 tablet by mouth nightly 90 tablet 3    spironolactone (ALDACTONE) 25 MG tablet Take 1 tablet by mouth daily 90 tablet 3     No current facility-administered medications for this visit. Allergies   Allergen Reactions    Codeine Other (See Comments)     Breathing difficulties       Social History     Socioeconomic History    Marital status:       Spouse name: Adriana Calvillo    Number of children: 2    Years of education: Not on file    Highest education level: Not on file   Occupational History    Not on file   Tobacco Use    Smoking status: Never    Smokeless tobacco: Never   Vaping Use    Vaping Use: Never used   Substance and Sexual Activity    Alcohol use: Not Currently    Drug use: Never    Sexual activity: Not Currently   Other Topics Concern    Not on file   Social History Narrative    Not on file     Social Determinants of Health     Financial Resource Strain: Low Risk     Difficulty of Paying Living Expenses: Not hard at all   Food Insecurity: No Food Insecurity    Worried About Running Out of Food in the Last Year: Never true    Ran Out of Food in the Last Year: Never true   Transportation Needs: Not on file   Physical Activity: Not on file   Stress: Not on file   Social Connections: Not on file   Intimate Partner Violence: Not on file   Housing Stability: Not on file       Family History   Problem Relation Age of Onset    Cancer Mother     Heart Disease Father     Breast Cancer Sister 54    Cancer Brother     Breast Cancer Sister 61    Cancer Sister     Cancer Sister     Cancer Sister     Cancer Sister     Cancer Sister     Breast Cancer Niece 62       Review of Systems   Constitutional:  Positive for activity change and chills. Negative for fatigue and fever. HENT:  Positive for ear pain and hearing loss. Negative for congestion. Eyes:  Positive for visual disturbance. Respiratory:  Positive for cough, choking and shortness of breath. Negative for chest tightness. Cardiovascular:  Positive for chest pain and leg swelling. Gastrointestinal:  Negative for abdominal distention, abdominal pain, diarrhea, nausea and vomiting. Genitourinary:  Positive for urgency. Negative for difficulty urinating. Musculoskeletal:  Positive for arthralgias and joint swelling. Skin:  Positive for color change, rash and wound. Neurological:  Positive for weakness. Negative for speech difficulty and headaches. Hematological:  Bruises/bleeds easily. Psychiatric/Behavioral:  Positive for confusion. Negative for agitation and behavioral problems. Blood pressure 120/76, pulse 90, temperature 97.6 °F (36.4 °C), weight 183 lb (83 kg), SpO2 96 %, not currently breastfeeding. Physical Examination: Physical Exam  Constitutional:       General: She is not in acute distress. Appearance: Normal appearance. She is obese. She is not toxic-appearing. HENT:      Head: Normocephalic. Right Ear: External ear normal.      Left Ear: External ear normal.      Nose: Nose normal.      Mouth/Throat:      Mouth: Mucous membranes are moist.   Eyes:      Extraocular Movements: Extraocular movements intact. Cardiovascular:      Rate and Rhythm: Normal rate and regular rhythm. Pulses: Normal pulses. Heart sounds: Normal heart sounds. No murmur heard. Pulmonary:      Effort: Pulmonary effort is normal.      Breath sounds: Wheezing present.    Abdominal: General: Abdomen is flat. Bowel sounds are normal. There is no distension. Palpations: Abdomen is soft. Tenderness: There is no abdominal tenderness. There is no guarding. Musculoskeletal:      Cervical back: Normal range of motion. Rigidity present. Right lower leg: Edema present. Left lower leg: Edema present. Skin:     General: Skin is warm and dry. Capillary Refill: Capillary refill takes 2 to 3 seconds. Neurological:      General: No focal deficit present. Mental Status: She is alert. Mental status is at baseline. Motor: Weakness present. Gait: Gait normal.   Psychiatric:         Mood and Affect: Mood normal.         Behavior: Behavior normal.         Thought Content: Thought content normal.         Judgment: Judgment normal.       Diagnostic data:   I have reviewed recent diagnostic testing including labs with patient today. Assessment and Plan:     Diagnosis Orders   1. Primary hypertension  CBC    Basic Metabolic Panel    TSH With Reflex Ft4    Hemoglobin A1C      2. Class 1 obesity with serious comorbidity and body mass index (BMI) of 30.0 to 30.9 in adult, unspecified obesity type  CBC    Basic Metabolic Panel    TSH With Reflex Ft4    Hemoglobin A1C      3. Mild intermittent asthma with acute exacerbation        4. CKD (chronic kidney disease) stage 2, GFR 60-89 ml/min        5. CHF (NYHA class II, ACC/AHA stage C) ([facility] [address]        6. GENNARO (obstructive sleep apnea)          78-year-old female with PMHx of CAD, HTN, CKD stage II, CHF with an EF of 50% coming into clinic to establish care. On today's visit pt has complaints of chest pain on extension and dyspnea on exertion, symptoms subside with rest.  Patient also states she sometimes have memory lapses. CAD: Patient is on a statin and Plavix.     Essential hypertension:   - Patient regularly checks her blood pressure with a SBP of ~120s   - BP was 120/76 on today's visit  - She is on Lasix 40 mg twice daily and Lopressor 25 mg twice daily and Aldactone 25 mg. HLD: Patient is on a statin 10 mg. Pt will get a lipid panel. CKD stage II:   - Baseline GFR of 61. Baseline creatinine of 0.9.  - Patient will get a BMP    CHF NYHA class II:  - Most recent echo of 50-55%  - Successful PCI of the PDA in January 2022.  - Patient is on fluid restrictions of 2 L salt intake of 2000 mg/day. - Patient is on no pressors Lasix, potassium tablets, Imdur, Aldactone, and Plavix. - Follows with Dr. Clover Brown in cardiology. Asthma:  - Patient follows with pulmonology NP Andrew Bardales.  - HRCT was mainly negative. - Methacholine challenge was negative  - PFT showed normal lung volumes and DLCO. - Patient is on Breo 100-25 mcg.    - Has follow-up appointment in September with pulmonology. Obstructive sleep apnea: Diagnosed years ago? At the time could not  tolerate CPAP. History of kidney stones: Most recent kidney stone last year. Advised to drink plenty of fluids. Arthritis:  - Patient gets cortisone shots every 3 months and both knees. - Most recent shot was done in March. Breast pain:  - Breast ultrasound done in March/2022. Results showed no discrete cystic or solid masses. No sonographic abnormalities in the axilla and normal lymph node seen. - Patient has slight pain in the right upper breast and physical exam.    Dementia?:  -Patient has episodes of  memory lapses. -Patient has an appointment with neurologist in September. Vaccine Shots  - States she already had the PNA vaccine shots.   - On next visit patient will get flu vaccine. Staffed with Dr. Kiana Mojica.  [id number]  Dept: [phone]  Dept Fax: 82 : [phone]

## 2022-09-06 ENCOUNTER — INITIAL CONSULT (OUTPATIENT)
Dept: NEUROLOGY | Age: 77
End: 2022-09-06
Payer: MEDICARE

## 2022-09-06 ENCOUNTER — NURSE ONLY (OUTPATIENT)
Dept: LAB | Age: 77
End: 2022-09-06

## 2022-09-06 VITALS
HEART RATE: 73 BPM | DIASTOLIC BLOOD PRESSURE: 68 MMHG | HEIGHT: 64 IN | SYSTOLIC BLOOD PRESSURE: 112 MMHG | WEIGHT: 186 LBS | OXYGEN SATURATION: 96 % | BODY MASS INDEX: 31.76 KG/M2

## 2022-09-06 DIAGNOSIS — M89.9 DISORDER OF BONE, UNSPECIFIED: ICD-10-CM

## 2022-09-06 DIAGNOSIS — R41.3 MEMORY PROBLEM: ICD-10-CM

## 2022-09-06 DIAGNOSIS — R41.3 MEMORY PROBLEM: Primary | ICD-10-CM

## 2022-09-06 LAB
FOLATE: 14.4 NG/ML (ref 4.8–24.2)
VITAMIN B-12: 561 PG/ML (ref 211–911)
VITAMIN D 25-HYDROXY: 35 NG/ML (ref 30–100)

## 2022-09-06 PROCEDURE — 3017F COLORECTAL CA SCREEN DOC REV: CPT | Performed by: PSYCHIATRY & NEUROLOGY

## 2022-09-06 PROCEDURE — G8427 DOCREV CUR MEDS BY ELIG CLIN: HCPCS | Performed by: PSYCHIATRY & NEUROLOGY

## 2022-09-06 PROCEDURE — G8417 CALC BMI ABV UP PARAM F/U: HCPCS | Performed by: PSYCHIATRY & NEUROLOGY

## 2022-09-06 PROCEDURE — 1036F TOBACCO NON-USER: CPT | Performed by: PSYCHIATRY & NEUROLOGY

## 2022-09-06 PROCEDURE — 1123F ACP DISCUSS/DSCN MKR DOCD: CPT | Performed by: PSYCHIATRY & NEUROLOGY

## 2022-09-06 PROCEDURE — 99204 OFFICE O/P NEW MOD 45 MIN: CPT | Performed by: PSYCHIATRY & NEUROLOGY

## 2022-09-06 PROCEDURE — 1090F PRES/ABSN URINE INCON ASSESS: CPT | Performed by: PSYCHIATRY & NEUROLOGY

## 2022-09-06 PROCEDURE — G8400 PT W/DXA NO RESULTS DOC: HCPCS | Performed by: PSYCHIATRY & NEUROLOGY

## 2022-09-06 NOTE — PATIENT INSTRUCTIONS
CT head WO contrast  Vitamin B12, folate  Vitamin B6 level  Vitamin D level  Call with any new symptoms or concerns. Follow up in 2 months.

## 2022-09-07 ENCOUNTER — TELEPHONE (OUTPATIENT)
Dept: CARDIOLOGY CLINIC | Age: 77
End: 2022-09-07

## 2022-09-07 DIAGNOSIS — I50.32 CHF NYHA CLASS II, CHRONIC, DIASTOLIC (HCC): Primary | ICD-10-CM

## 2022-09-07 NOTE — TELEPHONE ENCOUNTER
----- Message from NASIM Varela CNP sent at 9/7/2022 12:43 PM EDT -----  Regarding: RE:   Labs reviewed. No changes.   Repeat BMP in 2 months  ----- Message -----  From: Lucia Noyola RN  Sent: 8/30/2022   4:37 PM EDT  To: NASIM Varela CNP    You ordered repeat BMP in 1 mo after appointment 7/18 Patient  to RTC 4/2019 per LOV note.    Prescription approved per [facility] Refill Protocol.    Laure Perkins RN

## 2022-09-08 ENCOUNTER — HOSPITAL ENCOUNTER (OUTPATIENT)
Dept: CT IMAGING | Age: 77
Discharge: HOME | End: 2022-09-08
Payer: MEDICARE

## 2022-09-08 DIAGNOSIS — R41.3 MEMORY PROBLEM: ICD-10-CM

## 2022-09-08 LAB — VITAMIN B6: 61 NMOL/L (ref 20–125)

## 2022-09-08 PROCEDURE — 70450 CT HEAD/BRAIN W/O DYE: CPT

## 2022-09-13 RX ORDER — SIMVASTATIN 10 MG
10 TABLET ORAL NIGHTLY
Qty: 90 TABLET | Refills: 3 | Status: SHIPPED | OUTPATIENT
Start: 2022-09-13

## 2022-09-15 ENCOUNTER — OFFICE VISIT (OUTPATIENT)
Dept: INTERNAL MEDICINE CLINIC | Age: 77
End: 2022-09-15
Payer: MEDICARE

## 2022-09-15 VITALS
DIASTOLIC BLOOD PRESSURE: 76 MMHG | HEART RATE: 84 BPM | WEIGHT: 187.4 LBS | BODY MASS INDEX: 32.17 KG/M2 | TEMPERATURE: 97.5 F | SYSTOLIC BLOOD PRESSURE: 124 MMHG

## 2022-09-15 DIAGNOSIS — N17.9 AKI (ACUTE KIDNEY INJURY) (HCC): Primary | ICD-10-CM

## 2022-09-15 DIAGNOSIS — E78.5 HYPERLIPIDEMIA, UNSPECIFIED HYPERLIPIDEMIA TYPE: ICD-10-CM

## 2022-09-15 PROCEDURE — G8400 PT W/DXA NO RESULTS DOC: HCPCS

## 2022-09-15 PROCEDURE — 1123F ACP DISCUSS/DSCN MKR DOCD: CPT

## 2022-09-15 PROCEDURE — G8427 DOCREV CUR MEDS BY ELIG CLIN: HCPCS

## 2022-09-15 PROCEDURE — 1090F PRES/ABSN URINE INCON ASSESS: CPT

## 2022-09-15 PROCEDURE — 3017F COLORECTAL CA SCREEN DOC REV: CPT

## 2022-09-15 PROCEDURE — G8417 CALC BMI ABV UP PARAM F/U: HCPCS

## 2022-09-15 PROCEDURE — G0008 ADMIN INFLUENZA VIRUS VAC: HCPCS | Performed by: INTERNAL MEDICINE

## 2022-09-15 PROCEDURE — 1036F TOBACCO NON-USER: CPT

## 2022-09-15 PROCEDURE — 99214 OFFICE O/P EST MOD 30 MIN: CPT

## 2022-09-15 PROCEDURE — 90694 VACC AIIV4 NO PRSRV 0.5ML IM: CPT | Performed by: INTERNAL MEDICINE

## 2022-09-15 RX ORDER — CLOPIDOGREL BISULFATE 75 MG/1
75 TABLET ORAL DAILY
Qty: 90 TABLET | Refills: 3 | Status: SHIPPED | OUTPATIENT
Start: 2022-09-15

## 2022-09-15 RX ORDER — NEOMYCIN SULFATE, POLYMYXIN B SULFATE AND DEXAMETHASONE 3.5; 10000; 1 MG/ML; [USP'U]/ML; MG/ML
SUSPENSION/ DROPS OPHTHALMIC
COMMUNITY
Start: 2022-09-09

## 2022-09-15 RX ORDER — POTASSIUM CHLORIDE 750 MG/1
TABLET, FILM COATED, EXTENDED RELEASE ORAL
Qty: 180 TABLET | Refills: 3 | Status: SHIPPED | OUTPATIENT
Start: 2022-09-15

## 2022-09-15 RX ORDER — SPIRONOLACTONE 25 MG/1
25 TABLET ORAL DAILY
Qty: 90 TABLET | Refills: 3 | Status: SHIPPED | OUTPATIENT
Start: 2022-09-15

## 2022-09-15 RX ORDER — FUROSEMIDE 40 MG/1
40 TABLET ORAL 2 TIMES DAILY
Qty: 210 TABLET | Refills: 3 | Status: SHIPPED | OUTPATIENT
Start: 2022-09-15

## 2022-09-15 ASSESSMENT — ENCOUNTER SYMPTOMS
VOMITING: 0
COLOR CHANGE: 1
CHEST TIGHTNESS: 0
CHOKING: 0
DIARRHEA: 1
SHORTNESS OF BREATH: 0
ABDOMINAL PAIN: 0
COUGH: 0
ABDOMINAL DISTENTION: 0
NAUSEA: 0

## 2022-09-15 NOTE — PROGRESS NOTES
Chief Complaint   Patient presents with    Hypertension    Chronic Kidney Disease    Congestive Heart Failure    Obesity       HPI    55-year-old female with a PMHx of CAD, HTN, CKD stage II with a baseline GFR of 61, HLD, GENNARO not on CPAP, Asthma, arthritis, CHF NYHA class II coming into the clinic for for follow-up visit. She saw cardiology in January and had an echo done with an EF of 50-55%. She had a successful PCI of the PDA and was placed on 2 overlapping drug-eluting stents. She was recommended to have a fluid restriction of 2 L and sodium intake of 2000 mg/day. She was placed on Lopressor 25 mg BID, Lasix 40 mg twice daily, potassium tablets 10 mg twice daily, Imdur 60 mg twice daily, Aldactone 25 mg, and Plavix 75mg. She was advised to wear compression socks and does wear them daily. She saw her pulmonologist Abdiel Llamas on 07/28/2022. She is not on home oxygen. She had symptoms of SOB on exertion with slight chest pain at times. PFTs were done which showed slight restriction but normal lung volumes and DLCO. Methacholine challenge was negative, normal.    High-resolution CT scan was done of the lungs which showed no honeycombing atelectasis in the right lower lobes which is stable since 2019. Mild bronchiectasis at the lung bases and benign calcified granuloma which is stable. Tiny noncalcified left lower lobe pulmonary nodule which is also unchanged. She was placed on Protonix 40 mg before breakfast for possible GERD symptoms. She is on Breo 100-25 mcg 1 puff daily. Breast ultrasound was also done on 3/2022. It showed no discrete cystic or solid masses. There are no sonographic abnormalities in the axilla and normal lymph node was seen. In this visit, pt is here for a follow-up. She has no acute complaints. Vitals were wnl today. Complaint of increased urinary urgency not present, on this visit.     States she is still having HORTON which is relieved with rest, she takes albuterol prn. Has not needed to use inhaler this last month. Has CP on exertion as well, symptoms did not present GERD-like. Pain occurs when she is walking long distances or needs to climb stairs, and pain is relieved with rest within minutes. Cardiac cath was done at beginning of year, with a stent placed in the PDA. Follows with Dr. Rubi Engel. Patient also states she sometimes have memory lapses and on one occasion she went on a drive and forgot where she was going and was driving in circles for 3 hours. Had an appointment with neurologist on 09/06. B6, B9, B12 levels are wnl. Vitamin D levels are normal as well. CT head WO contrast done on 09/08 was negative for any intracranial abnormality per chart. She was asked to get a BMP on last visit, and electrolytes were wnl, but elevated BUN/Cr of 25/1.3 which is increased from 21/0.9 done on 01/19/22. Could be due to not drinking enough fluids states she drinks roughly 48 ounces, has diarrheal symptoms once in a while and is on Lasix. On ROS does state she has episodic diarrhea. No depression symptoms.            Past Medical History:   Diagnosis Date    CAD (coronary artery disease)     PCI     Chronic kidney disease     stones    Hyperlipidemia     Hypertension        Past Surgical History:   Procedure Laterality Date    APPENDECTOMY      CAROTID STENT PLACEMENT      CHOLECYSTECTOMY      COLONOSCOPY      CYSTO/URETERO/PYELOSCOPY, CALCULUS TX N/A 5/8/2019    CYSTO, LEFT URETEROSCOPY, URETERORENOSCOPY, LASER LITOHTRIPSY, BASKET RETRIEVAL OF STONE FRAGMENTS, LEFT URETERAL STENT performed by eClio Wren MD at [address] [place] [address] ([address] [place] [address])      age 37    HYSTERECTOMY, VAGINAL      KIDNEY STONE SURGERY      OVARY REMOVAL         Current Outpatient Medications   Medication Sig Dispense Refill    neomycin-polymyxin-dexameth (MAXITROL) 3.5-29609-8.1 ophthalmic suspension INSTILL 1 DROP INTO LEFT EYE 4 TIMES DAILY FOR 4 DAYS THEN TWICE A DAY FOR 4 DAYS THEN ONCE DAILY FOR 4 DAYS      simvastatin (ZOCOR) 10 MG tablet Take 1 tablet by mouth nightly 90 tablet 3    pantoprazole (PROTONIX) 40 MG tablet Take 1 tablet by mouth in the morning. 30 minutes prior to meal. 90 tablet 3    isosorbide mononitrate (IMDUR) 30 MG extended release tablet Take 2 tablets by mouth 2 times daily 180 tablet 1    albuterol sulfate HFA (VENTOLIN HFA) 108 (90 Base) MCG/ACT inhaler Inhale 2 puffs into the lungs every 6 hours as needed for Wheezing or Shortness of Breath 1 each 3    nitroGLYCERIN (NITROSTAT) 0.4 MG SL tablet Place 1 tablet under the tongue every 5 minutes as needed for Chest pain (Maximum 3 doses) 25 tablet 1    clopidogrel (PLAVIX) 75 MG tablet Take 1 tablet by mouth daily 90 tablet 3    furosemide (LASIX) 40 MG tablet Take 1 tablet by mouth 2 times daily AND as needed for wt gain, swelling 210 tablet 3    metoprolol tartrate (LOPRESSOR) 25 MG tablet Take 1 tablet by mouth 2 times daily 180 tablet 3    potassium chloride (KLOR-CON) 10 MEQ extended release tablet Take 1 tablet by mouth twice daily 180 tablet 3    spironolactone (ALDACTONE) 25 MG tablet Take 1 tablet by mouth daily 90 tablet 3     No current facility-administered medications for this visit. Allergies   Allergen Reactions    Codeine Other (See Comments)     Breathing difficulties       Social History     Socioeconomic History    Marital status:       Spouse name: Barbara Huerta    Number of children: 2    Years of education: Not on file    Highest education level: Not on file   Occupational History    Not on file   Tobacco Use    Smoking status: Never    Smokeless tobacco: Never   Vaping Use    Vaping Use: Never used   Substance and Sexual Activity    Alcohol use: Not Currently    Drug use: Never    Sexual activity: Not Currently   Other Topics Concern    Not on file   Social History Narrative    Not on file     Social Determinants of Health     Financial Resource Strain: Low Risk Difficulty of Paying Living Expenses: Not hard at all   Food Insecurity: No Food Insecurity    Worried About Running Out of Food in the Last Year: Never true    Ran Out of Food in the Last Year: Never true   Transportation Needs: Not on file   Physical Activity: Not on file   Stress: Not on file   Social Connections: Not on file   Intimate Partner Violence: Not on file   Housing Stability: Not on file       Family History   Problem Relation Age of Onset    Cancer Mother     Heart Disease Father     Breast Cancer Sister 54    Cancer Brother     Breast Cancer Sister 61    Cancer Sister     Cancer Sister     Cancer Sister     Cancer Sister     Cancer Sister     Breast Cancer Niece 62       Review of Systems   Constitutional:  Positive for activity change and fatigue. Negative for fever. HENT:  Positive for ear pain and hearing loss. Negative for congestion. Eyes:  Positive for visual disturbance (decreased vision). Respiratory:  Negative for cough, choking, chest tightness and shortness of breath. Cardiovascular:  Positive for chest pain and leg swelling. Gastrointestinal:  Positive for diarrhea. Negative for abdominal distention, abdominal pain, nausea and vomiting. Genitourinary:  Positive for urgency. Musculoskeletal:  Positive for arthralgias and joint swelling. Skin:  Positive for color change, rash and wound. Neurological:  Positive for weakness. Negative for speech difficulty and headaches. Hematological:  Bruises/bleeds easily. Psychiatric/Behavioral:  Positive for confusion. Negative for agitation and behavioral problems. Blood pressure 124/76, pulse 84, temperature 97.5 °F (36.4 °C), weight 187 lb 6.4 oz (85 kg), not currently breastfeeding. Physical Examination: Physical Exam  Constitutional:       General: She is not in acute distress. Appearance: Normal appearance. She is obese. She is not toxic-appearing. HENT:      Head: Normocephalic.       Right Ear: External ear

## 2022-09-26 NOTE — PROGRESS NOTES
Chief Complaint   Patient presents with    Consultation     Np memory impairment         Gokul Morgan is a 77 y.o. female who presents today for evaluation of memory trouble for the  January 2022. She has good insight into her memory trouble. She manages her own finances, no issues with remembering to pay bills. She drives and she has gotten lost while driving in a familiar place where she drove around for 3 hours and did not know where she was, but then was eventually able to find her way home. Her sleep is good, she wakes up feeling well rested. She has sleep apnea, however she did not tolerate the mask so she is no longer using it. She denies any vivid dream or hallucination. No head imaging done. She admits to feeling depressed at times. She  denies chest pain. No shortness of breath, no neck pain. No vision changes. No dysphagia. No fever. No rash. No weight loss. History provided by patient accompanied by her daughter. Past Medical History:   Diagnosis Date    CAD (coronary artery disease)     PCI     Chronic kidney disease     stones    Hyperlipidemia     Hypertension        Patient Active Problem List   Diagnosis    Ureteral stone    Renal stones    Status post partial thyroidectomy    Obesity    Hypertension    Asthma    Chest pain    Coronary artery disease involving native coronary artery of native heart without angina pectoris    Mild intermittent asthma with acute exacerbation    CKD (chronic kidney disease) stage 2, GFR 60-89 ml/min    CHF (NYHA class II, ACC/AHA stage C) ([facility])    GENNARO (obstructive sleep apnea)       Allergies   Allergen Reactions    Codeine Other (See Comments)     Breathing difficulties       Current Outpatient Medications   Medication Sig Dispense Refill    pantoprazole (PROTONIX) 40 MG tablet Take 1 tablet by mouth in the morning.  30 minutes prior to meal. 90 tablet 3    isosorbide mononitrate (IMDUR) 30 MG extended release tablet Take 2 tablets by mouth 2 times daily (Patient taking differently: Take 60 mg by mouth 2 times daily Patient reports taking 1 tablet twice daily. ) 180 tablet 1    furosemide (LASIX) 40 MG tablet Take 1 tablet by mouth 2 times daily AND as needed for wt gain, swelling 210 tablet 3    potassium chloride (KLOR-CON) 10 MEQ extended release tablet Take 1 tablet by mouth twice daily 90 tablet 3    metoprolol tartrate (LOPRESSOR) 25 MG tablet Take 1 tablet by mouth 2 times daily 180 tablet 3    nitroGLYCERIN (NITROSTAT) 0.4 MG SL tablet Place 1 tablet under the tongue every 5 minutes as needed for Chest pain (Maximum 3 doses) 25 tablet 1    clopidogrel (PLAVIX) 75 MG tablet Take 1 tablet by mouth daily 90 tablet 3    simvastatin (ZOCOR) 10 MG tablet Take 1 tablet by mouth nightly 90 tablet 3    albuterol sulfate HFA (VENTOLIN HFA) 108 (90 Base) MCG/ACT inhaler Inhale 2 puffs into the lungs every 6 hours as needed for Wheezing or Shortness of Breath (Patient not taking: Reported on 9/6/2022) 1 each 3    spironolactone (ALDACTONE) 25 MG tablet Take 1 tablet by mouth daily (Patient not taking: Reported on 9/6/2022) 90 tablet 3     No current facility-administered medications for this visit. Social History     Socioeconomic History    Marital status:       Spouse name: Diamond Patel    Number of children: 2   Tobacco Use    Smoking status: Never    Smokeless tobacco: Never   Vaping Use    Vaping Use: Never used   Substance and Sexual Activity    Alcohol use: Not Currently    Drug use: Never    Sexual activity: Not Currently     Social Determinants of Health     Financial Resource Strain: Low Risk     Difficulty of Paying Living Expenses: Not hard at all   Food Insecurity: No Food Insecurity    Worried About Running Out of Food in the Last Year: Never true    Ran Out of Food in the Last Year: Never true       Family History   Problem Relation Age of Onset    Cancer Mother     Heart Disease Father     Breast Cancer Sister 54    Cancer Brother     Breast Cancer Sister 61    Cancer Sister     Cancer Sister     Cancer Sister     Cancer Sister     Cancer Sister     Breast Cancer Niece 62         I reviewed the past medical history, allergies, medications, social history and family history. Review of Systems   All systems reviewed, and are all negative, except what is mentioned in HPI      Vitals:    09/06/22 1054   BP: 112/68   Site: Left Upper Arm   Position: Sitting   Cuff Size: Medium Adult   Pulse: 73   SpO2: 96%   Weight: 186 lb (84.4 kg)   Height: 5' 4\" (1.626 m)       Physical Examination:  General appearance - alert, well appearing, and in no distress, oriented to person, place, and time and over weight  Mental status- Level of Alertness: awake  Orientation: person, place, time  Memory: normal, she is able to name the president, she follows 2 step commands, she is up to date on recent events, she is able to spell the word world and spell it backwards, there is no anomia or apraxia noted. Her calculation is intact. Fund of Knowledge: normal  Attention/Concentration: poor at times  Language: normal. Mood is normal.   Neck - supple, no significant adenopathy, carotids upstroke normal bilaterally. There is no axillary lymphadenopathy. There is no carotid bruit. No neck lymphadenopathy. No thyroid enlargement   Neurological -   Cranial Acylqs-AM-GZP:.   Cranial nerve II: Normal   Cranial nerve III: Pupils: equal, round, reactive to light  Cranial nerves III, IV, VI: Extraocular Movements: intact   Cranial nerve V: Facial sensation: intact   Cranial nerve VII:Facial strength: intact   Cranial nerve VIII: Hearing: intact   Cranial nerve IX: Palate Elevation intact bilaterally  Cranial nerve XI: Shoulder shrug intact bilaterally  Cranial nerve XII: Tongue midline   neck supple without rigidity, there is no limitation of range of motion of the neck. DTR's are decreased distal and symmetric  Babinski sign negative  Motor exam is 5/5 in the upper and lower extremities. Normal muscle tone. There is no muscle atrophy. Sensory is intact for light touch   Coordination: finger to nose,  intact  Gait and station intact   Abnormal movement none, vibration reduced distally. Skin - warm, dry to touch, normal coloration, no rashes, no suspicious skin lesions  Superficial temporal artery pulses are normal.   There is no limitation of range of motion of the neck. There is no resting tremor, no pin rolling, no bradykinesia, no Hypohonia, normal blink rate. Musculoskeletal: Has no hand arthritis, no limitation of ROM in any of the four extremities. There is no leg edema. The Heart was regular in rate and rhythm. No heart murmur  Chest Clear, with  good effort. Abdomen soft, intact bowel sounds. We reviewed the patient records from referring provider and available information in the EHR       ASSESSMENT:      Diagnosis Orders   1. Memory problem           This is a 78-year-old female who presents with memory problem that has been ongoing since January 2022. She has good insight into her memory problem. On exam she is able to name the president, follows 2 step commands, she is up to date on recent events, she is able to spell the word world and spell it backwards, there is no anomia or apraxia noted. Her calculation is intact. She did have poor concentration and attention at times. The patient was counseled about her symptoms and work up results, she was also counseled about medication options and side effects. We shared with the patient that we suspect her symptoms of memory problem are likely more related to her poor attention/concentration, rather than Alzheimer's dementia. We will arrange for her to undergo CT head without contrast to evaluate for organic cause of her symptoms as well as lab work checking vitamin levels. After detailed discussion with patient and her daughter we agreed on the following plan.           Plan    CT head WO contrast  Vitamin B12, folate  Vitamin B6 level  Vitamin D level  Call with any new symptoms or concerns. Follow up in 2 months.      Total time 54 min    Belgica Vasques MD

## 2022-10-13 ENCOUNTER — HOSPITAL ENCOUNTER (OUTPATIENT)
Age: 77
Discharge: HOME | End: 2022-10-13
Payer: MEDICARE

## 2022-10-13 ENCOUNTER — OFFICE VISIT (OUTPATIENT)
Dept: INTERNAL MEDICINE CLINIC | Age: 77
End: 2022-10-13
Payer: MEDICARE

## 2022-10-13 VITALS
HEART RATE: 70 BPM | TEMPERATURE: 97.3 F | SYSTOLIC BLOOD PRESSURE: 116 MMHG | WEIGHT: 192.4 LBS | OXYGEN SATURATION: 96 % | BODY MASS INDEX: 33.03 KG/M2 | DIASTOLIC BLOOD PRESSURE: 68 MMHG

## 2022-10-13 DIAGNOSIS — I50.9 CHF (NYHA CLASS II, ACC/AHA STAGE C) (HCC): Primary | ICD-10-CM

## 2022-10-13 DIAGNOSIS — E78.5 HYPERLIPIDEMIA, UNSPECIFIED HYPERLIPIDEMIA TYPE: ICD-10-CM

## 2022-10-13 DIAGNOSIS — N18.2 CKD (CHRONIC KIDNEY DISEASE) STAGE 2, GFR 60-89 ML/MIN: ICD-10-CM

## 2022-10-13 DIAGNOSIS — N17.9 AKI (ACUTE KIDNEY INJURY) (HCC): ICD-10-CM

## 2022-10-13 LAB
ANION GAP SERPL CALCULATED.3IONS-SCNC: 12 MEQ/L (ref 8–16)
BUN BLDV-MCNC: 25 MG/DL (ref 7–22)
CALCIUM SERPL-MCNC: 8.9 MG/DL (ref 8.5–10.5)
CHLORIDE BLD-SCNC: 99 MEQ/L (ref 98–111)
CHOLESTEROL, TOTAL: 211 MG/DL (ref 100–199)
CO2: 29 MEQ/L (ref 23–33)
CREAT SERPL-MCNC: 1.1 MG/DL (ref 0.4–1.2)
GFR SERPL CREATININE-BSD FRML MDRD: 48 ML/MIN/1.73M2
GLUCOSE BLD-MCNC: 91 MG/DL (ref 70–108)
HDLC SERPL-MCNC: 56 MG/DL
LDL CHOLESTEROL CALCULATED: 117 MG/DL
POTASSIUM SERPL-SCNC: 4.6 MEQ/L (ref 3.5–5.2)
SODIUM BLD-SCNC: 140 MEQ/L (ref 135–145)
TRIGL SERPL-MCNC: 191 MG/DL (ref 0–199)

## 2022-10-13 PROCEDURE — 36415 COLL VENOUS BLD VENIPUNCTURE: CPT

## 2022-10-13 PROCEDURE — G8484 FLU IMMUNIZE NO ADMIN: HCPCS

## 2022-10-13 PROCEDURE — 80048 BASIC METABOLIC PNL TOTAL CA: CPT

## 2022-10-13 PROCEDURE — 3017F COLORECTAL CA SCREEN DOC REV: CPT

## 2022-10-13 PROCEDURE — 1090F PRES/ABSN URINE INCON ASSESS: CPT

## 2022-10-13 PROCEDURE — G8417 CALC BMI ABV UP PARAM F/U: HCPCS

## 2022-10-13 PROCEDURE — G8400 PT W/DXA NO RESULTS DOC: HCPCS

## 2022-10-13 PROCEDURE — 1123F ACP DISCUSS/DSCN MKR DOCD: CPT

## 2022-10-13 PROCEDURE — 99213 OFFICE O/P EST LOW 20 MIN: CPT

## 2022-10-13 PROCEDURE — 80061 LIPID PANEL: CPT

## 2022-10-13 PROCEDURE — G8427 DOCREV CUR MEDS BY ELIG CLIN: HCPCS

## 2022-10-13 PROCEDURE — 1036F TOBACCO NON-USER: CPT

## 2022-10-13 RX ORDER — SIMVASTATIN 10 MG
20 TABLET ORAL NIGHTLY
Qty: 90 TABLET | Refills: 1 | Status: SHIPPED | OUTPATIENT
Start: 2022-10-13

## 2022-10-13 RX ORDER — SPIRONOLACTONE 25 MG/1
25 TABLET ORAL EVERY 12 HOURS
Qty: 30 TABLET | Refills: 1 | Status: SHIPPED | OUTPATIENT
Start: 2022-10-13

## 2022-10-13 NOTE — PROGRESS NOTES
1945    Chief Complaint   Patient presents with    Hyperlipidemia           Hypertension    Asthma    Dementia    Arthritis       76-year-old female with a PMHx of CAD, HTN, CKD stage II with a baseline GFR of 61, HLD, GENNARO not on CPAP, Asthma, arthritis, CHF NYHA class II coming into the clinic for for follow-up visit. She saw cardiology in January and had an echo done with an EF of 50-55%. She had a successful PCI of the PDA and was placed on 2 overlapping drug-eluting stents. She was recommended to have a fluid restriction of 2 L and sodium intake of 2000 mg/day. She was placed on Lopressor 25 mg BID, Lasix 40 mg twice daily, potassium tablets 10 mg twice daily, Imdur 60 mg twice daily, Aldactone 25 mg, and Plavix 75mg. She was advised to wear compression socks and does wear them daily. She saw her pulmonologist Ugo Bhardwaj on 07/28/2022. She is not on home oxygen. She had symptoms of SOB on exertion with slight chest pain at times. PFTs were done which showed slight restriction but normal lung volumes and DLCO. Methacholine challenge was negative, normal.     High-resolution CT scan was done of the lungs which showed no honeycombing atelectasis in the right lower lobes which is stable since 2019. Mild bronchiectasis at the lung bases and benign calcified granuloma which is stable. Tiny noncalcified left lower lobe pulmonary nodule which is also unchanged. She was placed on Protonix 40 mg before breakfast for possible GERD symptoms. She is on Breo 100-25 mcg 1 puff daily. Patient also states she sometimes have memory lapses and on one occasion she went on a drive and forgot where she was going and was driving in circles for 3 hours. Had an appointment with neurologist on 09/06. B6, B9, B12 levels are wnl. Vitamin D levels are normal as well. CT head WO contrast done on 09/08 was negative for any intracranial abnormality per chart. Breast ultrasound was also done on 3/2022.   It showed no discrete cystic or solid masses. There are no sonographic abnormalities in the axilla and normal lymph node was seen. On this visit patient is here for a 1 month follow-up. No acute complaints. His vitals were WNL today. States she is still having HORTON, relieved on rest.  If symptoms were to persist she would take albuterol PRN. Has not needed to take it in the last month. Patient is still having CP on exertion as well, not GERD symptoms. Pain occurs when she is walking long distances, or needs to climb stairs and pain is relieved with rest within minutes. Cardiac cath was done at beginning of year, with a stent placed in the PDA and follows with Dr. Shelia Michelle. She was asked to get a BMP on this visit, electrolytes within WNL. BUN/CR of 25/1.1 creatinine was previously 1.3. GFR has improved to 48 from previous of 40. Lipid panel was also ordered total cholesterol of 211, triglycerides 191, HDL 56, . Past Medical History:   Diagnosis Date    CAD (coronary artery disease)     PCI     Chronic kidney disease     stones    Hyperlipidemia     Hypertension        Past Surgical History:   Procedure Laterality Date    APPENDECTOMY      CAROTID STENT PLACEMENT      CHOLECYSTECTOMY      COLONOSCOPY      CYSTO/URETERO/PYELOSCOPY, CALCULUS TX N/A 5/8/2019    CYSTO, LEFT URETEROSCOPY, URETERORENOSCOPY, LASER LITOHTRIPSY, BASKET RETRIEVAL OF STONE FRAGMENTS, LEFT URETERAL STENT performed by Val Ba MD at [facility] ([address] [place] [address])      age 37    HYSTERECTOMY, VAGINAL      KIDNEY STONE SURGERY      OVARY REMOVAL         Current Outpatient Medications   Medication Sig Dispense Refill    simvastatin (ZOCOR) 10 MG tablet Take 2 tablets by mouth nightly 90 tablet 1    spironolactone (ALDACTONE) 25 MG tablet Take 1 tablet by mouth in the morning and 1 tablet in the evening.  30 tablet 1    clopidogrel (PLAVIX) 75 MG tablet Take 1 tablet by mouth daily 90 tablet 3 furosemide (LASIX) 40 MG tablet Take 1 tablet by mouth 2 times daily AND as needed for wt gain, swelling 210 tablet 3    metoprolol tartrate (LOPRESSOR) 25 MG tablet Take 1 tablet by mouth 2 times daily 180 tablet 3    potassium chloride (KLOR-CON) 10 MEQ extended release tablet Take 1 tablet by mouth twice daily 180 tablet 3    spironolactone (ALDACTONE) 25 MG tablet Take 1 tablet by mouth daily 90 tablet 3    neomycin-polymyxin-dexameth (MAXITROL) 3.5-60108-2.1 ophthalmic suspension INSTILL 1 DROP INTO LEFT EYE 4 TIMES DAILY FOR 4 DAYS THEN TWICE A DAY FOR 4 DAYS THEN ONCE DAILY FOR 4 DAYS      simvastatin (ZOCOR) 10 MG tablet Take 1 tablet by mouth nightly 90 tablet 3    pantoprazole (PROTONIX) 40 MG tablet Take 1 tablet by mouth in the morning. 30 minutes prior to meal. 90 tablet 3    isosorbide mononitrate (IMDUR) 30 MG extended release tablet Take 2 tablets by mouth 2 times daily 180 tablet 1    albuterol sulfate HFA (VENTOLIN HFA) 108 (90 Base) MCG/ACT inhaler Inhale 2 puffs into the lungs every 6 hours as needed for Wheezing or Shortness of Breath 1 each 3    nitroGLYCERIN (NITROSTAT) 0.4 MG SL tablet Place 1 tablet under the tongue every 5 minutes as needed for Chest pain (Maximum 3 doses) 25 tablet 1     No current facility-administered medications for this visit. Allergies   Allergen Reactions    Codeine Other (See Comments)     Breathing difficulties       Social History     Socioeconomic History    Marital status:       Spouse name: Nanette Clements    Number of children: 2    Years of education: Not on file    Highest education level: Not on file   Occupational History    Not on file   Tobacco Use    Smoking status: Never    Smokeless tobacco: Never   Vaping Use    Vaping Use: Never used   Substance and Sexual Activity    Alcohol use: Not Currently    Drug use: Never    Sexual activity: Not Currently   Other Topics Concern    Not on file   Social History Narrative    Not on file     Social Determinants of Health     Financial Resource Strain: Low Risk     Difficulty of Paying Living Expenses: Not hard at all   Food Insecurity: No Food Insecurity    Worried About Running Out of Food in the Last Year: Never true    Ran Out of Food in the Last Year: Never true   Transportation Needs: Not on file   Physical Activity: Not on file   Stress: Not on file   Social Connections: Not on file   Intimate Partner Violence: Not on file   Housing Stability: Not on file       Family History   Problem Relation Age of Onset    Cancer Mother     Heart Disease Father     Breast Cancer Sister 54    Cancer Brother     Breast Cancer Sister 61    Cancer Sister     Cancer Sister     Cancer Sister     Cancer Sister     Cancer Sister     Breast Cancer Niece 62       ROS negative besides stated above. Blood pressure 116/68, pulse 70, temperature 97.3 °F (36.3 °C), temperature source Tympanic, weight 192 lb 6.4 oz (87.3 kg), SpO2 96 %, not currently breastfeeding. Physical Examination: General appearance -alert, well appearing, and in no distress. Obese  Mental status - alert, oriented to person, place, and time  Head - atraumatic, normocephalic  Eyes -PERRL  Ears - external ears are normal, hearing is grossly normal  Mouth - oral pharynx clear,   Neck - supple, no significant adenopathy  Chest -wheezing present but no, rales or rhonchi, symmetric air entry  Heart -RRR with normal S1, S2, no murmurs  Abdomen -soft, nontender, nondistended  Neurological - alert, oriented, cranial nerves II-XII intact, motor and sensation are grossly intact bilateral upper and lower extremities. Extremities - peripheral pulses normal, peripheral edema present on B/L LE  Skin - warm and dry    Diagnostic data:   I have reviewed recent diagnostic testing including labs with patient today. Assessment and Plan:     Diagnosis Orders   1. CHF (NYHA class II, ACC/AHA stage C) ([facility])  Basic Metabolic Panel    Lipid Panel      2.  CKD (chronic kidney disease) stage 2, GFR 60-89 ml/min  Basic Metabolic Panel    Lipid Panel           81-year-old female with PMHx of CAD, HTN, CKD stage II, CHF with an EF of 50% coming in for follow-up. On today's visit pt had no acute complaints. Chest pain on extension and dyspnea on exertion symptoms are still present but both subside with rest.       CAD: Patient is on a statin and Plavix. Essential hypertension:   - Patient regularly checks her blood pressure with a SBP of ~120s   - /68 on this visit  - She is on Lasix 40 mg twice daily and Lopressor 25 mg twice daily and Aldactone. - Aldactone increased to 25 mg twice daily now from once a day     HLD: Lipid panel shows total cholesterol of 211, triglycerides 191, HDL 56, . On 10/13/2022  - Simvastatin 10 mg increased to BID now from once a day. CKD stage II:   - Baseline GFR of 61. Baseline creatinine of 0.9.  - BMP done on 08/18 showed a BUN/Cr of 25/1.3  w. GFR of 40.  -BMP done on 10/13 shows BUN/CR of 25/1.1 with GFR of 48  - Repeat BMP before next visit. CHF NYHA class II:  - Most recent echo of 50-55%  - Successful PCI of the PDA in January 2022.  - Patient is on fluid restrictions of 2 L salt intake of 2000 mg/day. - Patient is on no pressors Lasix, potassium tablets, Imdur, Aldactone, and Plavix. - Follows with Dr. Celsa Field in cardiology. Asthma:  - Patient follows with pulmonology NP Sheridan Messer.  - HRCT was mainly negative. - Methacholine challenge was negative  - PFT showed normal lung volumes and DLCO. - Patient is on Breo 100-25 mcg. Obstructive sleep apnea: Diagnosed years ago? At the time could not  tolerate CPAP. History of kidney stones: Most recent kidney stone last year. Advised to drink plenty of fluids. Arthritis:  - Patient gets cortisone shots every 3 months and both knees. - Most recent shot was done in March. - No acute pain on this visit, will need shot in few weeks she states.       Breast pain:  - Breast ultrasound done in March/2022. Results showed no discrete cystic or solid masses. No sonographic abnormalities in the axilla and normal lymph node seen. - Patient has slight pain in the right upper breast and physical exam.     Dementia?:  -Patient has episodes of  memory lapses. -Patient has an appointment with neurologist in September.  - Had an appointment with neurologist on 09/06.   - B6, B9, B12 levels are wnl. Vitamin D levels are normal as well. - CT head WO contrast done on 09/08 was negative for any intracranial abnormality per chart.  - Recommend having a conversation with neurologist about driving.  - Followup w. Neurologist on 11/22. Vaccine Shots  - States she already had the PNA vaccine shots.   - Received Flu shot on 09/15. Staffed with: Dr. Gelacio Quintero. Shahida Fonseca.  [address]  Dept: [phone]  Dept Fax: 15 : [phone]

## 2022-10-27 ENCOUNTER — OFFICE VISIT (OUTPATIENT)
Dept: PULMONOLOGY | Age: 77
End: 2022-10-27
Payer: MEDICARE

## 2022-10-27 VITALS
HEART RATE: 72 BPM | WEIGHT: 186.6 LBS | OXYGEN SATURATION: 94 % | HEIGHT: 64 IN | DIASTOLIC BLOOD PRESSURE: 60 MMHG | BODY MASS INDEX: 31.86 KG/M2 | SYSTOLIC BLOOD PRESSURE: 108 MMHG | TEMPERATURE: 96.9 F

## 2022-10-27 DIAGNOSIS — R06.02 SOB (SHORTNESS OF BREATH): ICD-10-CM

## 2022-10-27 DIAGNOSIS — J98.6 ELEVATED DIAPHRAGM: ICD-10-CM

## 2022-10-27 DIAGNOSIS — J45.909 MILD REACTIVE AIRWAYS DISEASE, UNSPECIFIED WHETHER PERSISTENT: Primary | ICD-10-CM

## 2022-10-27 PROCEDURE — 3074F SYST BP LT 130 MM HG: CPT | Performed by: NURSE PRACTITIONER

## 2022-10-27 PROCEDURE — 1123F ACP DISCUSS/DSCN MKR DOCD: CPT | Performed by: NURSE PRACTITIONER

## 2022-10-27 PROCEDURE — 3078F DIAST BP <80 MM HG: CPT | Performed by: NURSE PRACTITIONER

## 2022-10-27 PROCEDURE — 99213 OFFICE O/P EST LOW 20 MIN: CPT | Performed by: NURSE PRACTITIONER

## 2022-10-27 ASSESSMENT — ENCOUNTER SYMPTOMS
RESPIRATORY NEGATIVE: 1
EYES NEGATIVE: 1
COUGH: 0
SHORTNESS OF BREATH: 0
WHEEZING: 0
GASTROINTESTINAL NEGATIVE: 1
ALLERGIC/IMMUNOLOGIC NEGATIVE: 1

## 2022-10-27 NOTE — PROGRESS NOTES
Substance Use Topics    Alcohol use: Not Currently    Drug use: Never     ALLERGIES:  Allergies   Allergen Reactions    Codeine Other (See Comments)     Breathing difficulties     FAMILY HISTORY:  Family History   Problem Relation Age of Onset    Cancer Mother     Heart Disease Father     Breast Cancer Sister 54    Cancer Brother     Breast Cancer Sister 61    Cancer Sister     Cancer Sister     Cancer Sister     Cancer Sister     Cancer Sister     Breast Cancer Niece 62     CURRENT MEDICATIONS:  Current Outpatient Medications   Medication Sig Dispense Refill    simvastatin (ZOCOR) 10 MG tablet Take 2 tablets by mouth nightly 90 tablet 1    spironolactone (ALDACTONE) 25 MG tablet Take 1 tablet by mouth in the morning and 1 tablet in the evening. 30 tablet 1    clopidogrel (PLAVIX) 75 MG tablet Take 1 tablet by mouth daily 90 tablet 3    furosemide (LASIX) 40 MG tablet Take 1 tablet by mouth 2 times daily AND as needed for wt gain, swelling 210 tablet 3    metoprolol tartrate (LOPRESSOR) 25 MG tablet Take 1 tablet by mouth 2 times daily 180 tablet 3    potassium chloride (KLOR-CON) 10 MEQ extended release tablet Take 1 tablet by mouth twice daily 180 tablet 3    spironolactone (ALDACTONE) 25 MG tablet Take 1 tablet by mouth daily 90 tablet 3    neomycin-polymyxin-dexameth (MAXITROL) 3.5-16186-8.1 ophthalmic suspension INSTILL 1 DROP INTO LEFT EYE 4 TIMES DAILY FOR 4 DAYS THEN TWICE A DAY FOR 4 DAYS THEN ONCE DAILY FOR 4 DAYS      simvastatin (ZOCOR) 10 MG tablet Take 1 tablet by mouth nightly 90 tablet 3    pantoprazole (PROTONIX) 40 MG tablet Take 1 tablet by mouth in the morning.  30 minutes prior to meal. 90 tablet 3    isosorbide mononitrate (IMDUR) 30 MG extended release tablet Take 2 tablets by mouth 2 times daily 180 tablet 1    albuterol sulfate HFA (VENTOLIN HFA) 108 (90 Base) MCG/ACT inhaler Inhale 2 puffs into the lungs every 6 hours as needed for Wheezing or Shortness of Breath 1 each 3    nitroGLYCERIN (NITROSTAT) 0.4 MG SL tablet Place 1 tablet under the tongue every 5 minutes as needed for Chest pain (Maximum 3 doses) 25 tablet 1     No current facility-administered medications for this visit. ROS   Review of Systems   Constitutional: Negative. Negative for chills and fever. HENT: Negative. Negative for congestion. Eyes: Negative. Respiratory: Negative. Negative for cough, shortness of breath and wheezing. Cardiovascular: Negative. Negative for chest pain and leg swelling. Gastrointestinal: Negative. Endocrine: Negative. Genitourinary: Negative. Musculoskeletal: Negative. Allergic/Immunologic: Negative. Neurological: Negative. Hematological: Negative. Psychiatric/Behavioral: Negative. Negative for sleep disturbance. Physical exam   /60   Pulse 72   Temp 96.9 °F (36.1 °C)   Ht 5' 4\" (1.626 m)   Wt 186 lb 9.6 oz (84.6 kg)   SpO2 94%   BMI 32.03 kg/m²    Wt Readings from Last 3 Encounters:   10/27/22 186 lb 9.6 oz (84.6 kg)   10/13/22 192 lb 6.4 oz (87.3 kg)   09/15/22 187 lb 6.4 oz (85 kg)       Physical Exam  Vitals and nursing note reviewed. Constitutional:       Appearance: She is well-developed. She is obese. HENT:      Head: Normocephalic and atraumatic. Eyes:      Conjunctiva/sclera: Conjunctivae normal.      Pupils: Pupils are equal, round, and reactive to light. Neck:      Vascular: No JVD. Cardiovascular:      Rate and Rhythm: Normal rate and regular rhythm. Heart sounds: Normal heart sounds. No murmur heard. No friction rub. No gallop. Pulmonary:      Effort: Pulmonary effort is normal. No respiratory distress. Breath sounds: Normal breath sounds. No wheezing or rales. Abdominal:      General: Bowel sounds are normal.      Palpations: Abdomen is soft. Musculoskeletal:         General: Normal range of motion. Cervical back: Normal range of motion and neck supple. Skin:     General: Skin is warm and dry. Capillary Refill: Capillary refill takes less than 2 seconds. Neurological:      Mental Status: She is alert and oriented to person, place, and time. Psychiatric:         Behavior: Behavior normal.         Thought Content: Thought content normal.         Judgment: Judgment normal.        Results   Lung Nodule Screening     [] Qualifies    [x] Does not qualify   [] Declined    [] Completed    The USPSTF recommends annual screening for lung cancer with low-dose computed tomography (LDCT) in adults aged 48 to [de-identified] years who have a 30 pack-year smoking history and currently smoke or have quit within the past 20 years. Screening should be discontinued once a person has not smoked for 20 years or develops a health problem that substantially limits life expectancy or the ability or willingness to have curative lung surgery. 7/22/22  Methacholine Challenge (-) for asthma     Assessment      Diagnosis Orders   1. Mild reactive airways disease, unspecified whether persistent        2.  SOB (shortness of breath)        3. Elevated diaphragm      right side             Plan   -Stop Breo  -Continue Albuterol PRN for SOB/wheezing   -Activity as tolerated   -RTC PRN for pulmonary issues- all pulmonary testing basically (-)  -Advised to maintain pneumonia vaccine with PCP and to take flu vaccine this coming season.  -Advised patient to call office with any changes, questions, or concerns regarding respiratory status  -Weight loss encouraged     Will see Vivien Litten back in:prn    Shanell Corea CNP  10/27/2022

## 2022-10-27 NOTE — PROGRESS NOTES
Patient is experiencing SOB: Yes only with exertion    Patient is experiencing wheezing: No    Patient states they have had a Absent = 4 cough.     Phlegm is occasional, color:  clear    Patient is coughing up blood: no    Patient has been experiencing chest pains: non-existent    Patient is currently taking the following inhaler(s): Albuterol

## 2022-11-07 ENCOUNTER — OFFICE VISIT (OUTPATIENT)
Dept: NEUROLOGY | Age: 77
End: 2022-11-07
Payer: MEDICARE

## 2022-11-07 VITALS
OXYGEN SATURATION: 97 % | DIASTOLIC BLOOD PRESSURE: 70 MMHG | HEART RATE: 70 BPM | BODY MASS INDEX: 31.76 KG/M2 | HEIGHT: 64 IN | SYSTOLIC BLOOD PRESSURE: 132 MMHG | WEIGHT: 186 LBS

## 2022-11-07 DIAGNOSIS — R41.3 MEMORY PROBLEM: Primary | ICD-10-CM

## 2022-11-07 PROCEDURE — 1036F TOBACCO NON-USER: CPT | Performed by: NURSE PRACTITIONER

## 2022-11-07 PROCEDURE — G8400 PT W/DXA NO RESULTS DOC: HCPCS | Performed by: NURSE PRACTITIONER

## 2022-11-07 PROCEDURE — 1123F ACP DISCUSS/DSCN MKR DOCD: CPT | Performed by: NURSE PRACTITIONER

## 2022-11-07 PROCEDURE — G8484 FLU IMMUNIZE NO ADMIN: HCPCS | Performed by: NURSE PRACTITIONER

## 2022-11-07 PROCEDURE — G8417 CALC BMI ABV UP PARAM F/U: HCPCS | Performed by: NURSE PRACTITIONER

## 2022-11-07 PROCEDURE — 3078F DIAST BP <80 MM HG: CPT | Performed by: NURSE PRACTITIONER

## 2022-11-07 PROCEDURE — G8427 DOCREV CUR MEDS BY ELIG CLIN: HCPCS | Performed by: NURSE PRACTITIONER

## 2022-11-07 PROCEDURE — 3017F COLORECTAL CA SCREEN DOC REV: CPT | Performed by: NURSE PRACTITIONER

## 2022-11-07 PROCEDURE — 1090F PRES/ABSN URINE INCON ASSESS: CPT | Performed by: NURSE PRACTITIONER

## 2022-11-07 PROCEDURE — 3074F SYST BP LT 130 MM HG: CPT | Performed by: NURSE PRACTITIONER

## 2022-11-07 PROCEDURE — 99213 OFFICE O/P EST LOW 20 MIN: CPT | Performed by: NURSE PRACTITIONER

## 2022-11-07 NOTE — PROGRESS NOTES
NEUROLOGY OUT PATIENT FOLLOW UP NOTE:  88/4/151804:06 AM    Lamar Andre is here for follow up for memory trouble. Patient Active Problem List   Diagnosis    Ureteral stone    Renal stones    Status post partial thyroidectomy    Obesity    Hypertension    Asthma    Coronary artery disease involving native coronary artery of native heart without angina pectoris    Mild intermittent asthma with acute exacerbation    CKD (chronic kidney disease) stage 2, GFR 60-89 ml/min    CHF (NYHA class II, ACC/AHA stage C) ([facility])    GENNARO (obstructive sleep apnea)            ROS:  Respiratory : no cough, no shortness of breath  Cardiac: no chest pain. No palpitations.   Renal : no flank pain, no hematuria    Skin: no rash      Allergies   Allergen Reactions    Codeine Other (See Comments)     Breathing difficulties       Current Outpatient Medications:     simvastatin (ZOCOR) 10 MG tablet, Take 2 tablets by mouth nightly, Disp: 90 tablet, Rfl: 1    spironolactone (ALDACTONE) 25 MG tablet, Take 1 tablet by mouth in the morning and 1 tablet in the evening., Disp: 30 tablet, Rfl: 1    clopidogrel (PLAVIX) 75 MG tablet, Take 1 tablet by mouth daily, Disp: 90 tablet, Rfl: 3    furosemide (LASIX) 40 MG tablet, Take 1 tablet by mouth 2 times daily AND as needed for wt gain, swelling, Disp: 210 tablet, Rfl: 3    metoprolol tartrate (LOPRESSOR) 25 MG tablet, Take 1 tablet by mouth 2 times daily, Disp: 180 tablet, Rfl: 3    potassium chloride (KLOR-CON) 10 MEQ extended release tablet, Take 1 tablet by mouth twice daily, Disp: 180 tablet, Rfl: 3    spironolactone (ALDACTONE) 25 MG tablet, Take 1 tablet by mouth daily, Disp: 90 tablet, Rfl: 3    neomycin-polymyxin-dexameth (MAXITROL) 3.5-65762-7.1 ophthalmic suspension, INSTILL 1 DROP INTO LEFT EYE 4 TIMES DAILY FOR 4 DAYS THEN TWICE A DAY FOR 4 DAYS THEN ONCE DAILY FOR 4 DAYS, Disp: , Rfl:     simvastatin (ZOCOR) 10 MG tablet, Take 1 tablet by mouth nightly, Disp: 90 tablet, Rfl: 3 pantoprazole (PROTONIX) 40 MG tablet, Take 1 tablet by mouth in the morning. 30 minutes prior to meal., Disp: 90 tablet, Rfl: 3    isosorbide mononitrate (IMDUR) 30 MG extended release tablet, Take 2 tablets by mouth 2 times daily, Disp: 180 tablet, Rfl: 1    albuterol sulfate HFA (VENTOLIN HFA) 108 (90 Base) MCG/ACT inhaler, Inhale 2 puffs into the lungs every 6 hours as needed for Wheezing or Shortness of Breath, Disp: 1 each, Rfl: 3    nitroGLYCERIN (NITROSTAT) 0.4 MG SL tablet, Place 1 tablet under the tongue every 5 minutes as needed for Chest pain (Maximum 3 doses), Disp: 25 tablet, Rfl: 1    I reviewed the past medical history, allergies, medications, social history and family history. PE:   Vitals:    11/07/22 1126   BP: 132/70   Site: Left Upper Arm   Position: Sitting   Cuff Size: Medium Adult   Pulse: 70   SpO2: 97%   Weight: 186 lb (84.4 kg)   Height: 5' 4\" (1.626 m)     General Appearance:  awake, alert, oriented, in no acute distress  Gen: NAD, Language is Intact. Skin: no rash, lesion, dry  to touch. warm  Head: no rash, no icterus  Neck: There is no carotid bruits. The Neck is supple. There is no neck lymphadenopathy. Neuro: CN 2-12 grossly intact with no focal deficits. Power 5/5 Throughout symmetric, Reflexes are  symmetric. Long tracts are intact. Cerebellar exam is Intact. Sensory exam is intact to light touch. Gait is is limping (due to right knee pain). she is able to name the president, she follows 2 step commands, she is up to date on recent events, she is able to spell the word world and spell it backwards, there is no anomia or apraxia noted. Her calculation is intact. Musculoskeletal:  Has no hand arthritis, no limitation of ROM in any of the four extremities, except right knee.   Lower extremities no edema          DATA:      Results for orders placed or performed during the hospital encounter of 10/13/22   Lipid Panel   Result Value Ref Range    Cholesterol, Total 211 (H) 100 - 199 mg/dL    Triglycerides 191 0 - 199 mg/dL    HDL 56 mg/dL    LDL Calculated 117 mg/dL   Basic Metabolic Panel   Result Value Ref Range    Sodium 140 135 - 145 meq/L    Potassium 4.6 3.5 - 5.2 meq/L    Chloride 99 98 - 111 meq/L    CO2 29 23 - 33 meq/L    Glucose 91 70 - 108 mg/dL    BUN 25 (H) 7 - 22 mg/dL    Creatinine 1.1 0.4 - 1.2 mg/dL    Calcium 8.9 8.5 - 10.5 mg/dL   Anion Gap   Result Value Ref Range    Anion Gap 12.0 8.0 - 16.0 meq/L   Glomerular Filtration Rate, Estimated   Result Value Ref Range    Est, Glom Filt Rate 48 (A) ml/min/1.73m2          Results for orders placed during the hospital encounter of 09/08/22    CT HEAD WO CONTRAST    Narrative  PROCEDURE: CT HEAD WO CONTRAST    CLINICAL INFORMATION: Memory problem. COMPARISON: No prior study. TECHNIQUE: Noncontrast 5 mm axial images were obtained through the brain. Sagittal and coronal reconstructions were created. All CT scans at this facility use dose modulation, iterative reconstruction, and/or weight-based dosing when appropriate to reduce radiation dose to as low as reasonably achievable. FINDINGS:  The ventricles, cisterns and sulci are symmetric and normal in size and configuration. Gray-white matter differentiation appears grossly preserved. No intracranial hemorrhage, mass effect or midline shift is identified. The calvarium appears intact. There is hyperostosis frontalis internus. Orbits are unremarkable. Visualized paranasal sinuses are clear. Mastoid air cells are clear. Impression  No intracranial abnormality identified. **This report has been created using voice recognition software. It may contain minor errors which are inherent in voice recognition technology. **    Final report electronically signed by Dr. Liset Fischer MD on 9/8/2022 10:24 AM         Assessment:     Diagnosis Orders   1. Memory problem             She is doing well. She reports her memory is improved.  I shared with her that her CT head showed no intracranial abnormality identified and her lab work checking vitamin levels were within acceptable range. She felt that her memory trouble was caused by the circumstances at the time as she was under a lot of stress. Her daughter agrees her memory is improved. After detailed discussion with patient and her daughter we agreed on the following plan. Plan:  Follow up as needed. Call if any questions or concerns.     Total time 24 min    Leonor Baez, NASIM - CNP

## 2022-11-10 ENCOUNTER — TELEPHONE (OUTPATIENT)
Dept: CARDIOLOGY CLINIC | Age: 77
End: 2022-11-10

## 2022-11-10 ENCOUNTER — OFFICE VISIT (OUTPATIENT)
Dept: INTERNAL MEDICINE CLINIC | Age: 77
End: 2022-11-10

## 2022-11-10 VITALS
HEART RATE: 90 BPM | OXYGEN SATURATION: 95 % | WEIGHT: 186.2 LBS | BODY MASS INDEX: 31.79 KG/M2 | TEMPERATURE: 98.3 F | HEIGHT: 64 IN | DIASTOLIC BLOOD PRESSURE: 82 MMHG | SYSTOLIC BLOOD PRESSURE: 124 MMHG

## 2022-11-10 DIAGNOSIS — I50.9 CHF (NYHA CLASS II, ACC/AHA STAGE C) (HCC): Primary | ICD-10-CM

## 2022-11-10 DIAGNOSIS — E78.00 HYPERCHOLESTEROLEMIA: ICD-10-CM

## 2022-11-10 DIAGNOSIS — I10 PRIMARY HYPERTENSION: ICD-10-CM

## 2022-11-10 DIAGNOSIS — I25.10 CORONARY ARTERY DISEASE INVOLVING NATIVE CORONARY ARTERY OF NATIVE HEART WITHOUT ANGINA PECTORIS: ICD-10-CM

## 2022-11-10 PROBLEM — J45.20 MILD INTERMITTENT ASTHMA WITHOUT COMPLICATION: Status: ACTIVE | Noted: 2022-08-18

## 2022-11-10 PROBLEM — E04.1 NONTOXIC SINGLE THYROID NODULE: Status: ACTIVE | Noted: 2022-11-10

## 2022-11-10 PROBLEM — N18.30 CHRONIC RENAL DISEASE, STAGE 3, MODERATELY DECREASED GLOMERULAR FILTRATION RATE (GFR) BETWEEN 30-59 ML/MIN/1.73 SQUARE METER (HCC): Status: ACTIVE | Noted: 2022-08-18

## 2022-11-10 RX ORDER — SIMVASTATIN 20 MG
20 TABLET ORAL NIGHTLY
Qty: 90 TABLET | Refills: 1 | Status: SHIPPED | OUTPATIENT
Start: 2022-11-10

## 2022-11-10 RX ORDER — PREDNISOLONE ACETATE 10 MG/ML
SUSPENSION/ DROPS OPHTHALMIC
COMMUNITY
Start: 2022-10-17

## 2022-11-10 RX ORDER — SPIRONOLACTONE 25 MG/1
25 TABLET ORAL EVERY 12 HOURS
Qty: 180 TABLET | Refills: 1 | Status: SHIPPED | OUTPATIENT
Start: 2022-11-10

## 2022-11-10 NOTE — PATIENT INSTRUCTIONS
-Call residency clinic, talk with Dr. Colonel Reyes to double check which water pills you are taking.    -Take aldactone 25 mg once in the morning and once in the evening.  -Take lasix 40 mg once in the morning.  If you have increase swelling in your legs take a second lasix pill in the evening    BMP: get this lab done now    LDL and ALT: 2-3 months

## 2022-11-10 NOTE — PROGRESS NOTES
1945    Chief Complaint   Patient presents with    Congestive Heart Failure     4 weeks     Coronary Artery Disease    Chronic Kidney Disease    Hypertension     HPI  80-year-old female with a PMHx of CAD, HTN, CKD stage III with a baseline GFR of 46, HLD, GENNARO not on CPAP, Asthma, arthritis with involvement of right knee, CHF NYHA class II coming into the clinic for a 4 week follow-up visit. She saw cardiology in January and had an echo done with an EF of 50-55%. She had a successful PCI of the PDA and was placed on 2 overlapping drug-eluting stents. She was recommended to have a fluid restriction of 2 L and sodium intake of 2000 mg/day. She was placed on Lopressor 25 mg BID, Lasix 40 mg twice daily, potassium tablets 10 mg twice daily, Imdur 60 mg twice daily, Aldactone 25 mg which was increased from once a day to twice daily at patients last visit on 10/17/22, and Plavix 75mg. She was advised to wear compression socks and does wear them daily. She saw her pulmonologist Shawn Morelos on 07/28/2022. She is not on home oxygen. She had symptoms of SOB on exertion with slight chest pain at times. PFTs were done which showed slight restriction but normal lung volumes and DLCO. Methacholine challenge was negative, normal. High-resolution CT scan was done of the lungs which showed no honeycombing atelectasis in the right lower lobes which is stable since 2019. Mild bronchiectasis at the lung bases and benign calcified granuloma which is stable. Tiny noncalcified left lower lobe pulmonary nodule which is also unchanged. She was placed on Protonix 40 mg before breakfast for possible GERD symptoms. She is on Breo 100-25 mcg 1 puff daily. On this visit patient is here for a 1 month follow-up. No acute complaints. Her vitals were WNL today. Ar patients    States she is still having HORTON, relieved on rest.  If symptoms were to persist she would take albuterol PRN.   Has not needed to take it in the last month. Patient is still having CP on exertion as well, not GERD symptoms. Pain occurs when she is walking long distances, or needs to climb stairs and pain is relieved with rest within minutes. Cardiac cath was done at beginning of year, with a stent placed in the PDA and follows with Dr. Maribel Lawton. She was asked to get a BMP on this visit, this did not occur. At patient's last visit on 10/17/22 her electrolytes within WNL. BUN/CR of 25/1.1 creatinine was previously 1.3. GFR has improved to 48 from previous of 40. Lipid panel was also ordered on 10/17/22 which showed  total cholesterol of 211, triglycerides 191, HDL 56,     Past Medical History:   Diagnosis Date    CAD (coronary artery disease)     PCI     Chronic kidney disease     stones    Hyperlipidemia     Hypertension      Past Surgical History:   Procedure Laterality Date    APPENDECTOMY      CAROTID STENT PLACEMENT      CHOLECYSTECTOMY      COLONOSCOPY      CYSTO/URETERO/PYELOSCOPY, CALCULUS TX N/A 5/8/2019    CYSTO, LEFT URETEROSCOPY, URETERORENOSCOPY, LASER LITOHTRIPSY, BASKET RETRIEVAL OF STONE FRAGMENTS, LEFT URETERAL STENT performed by Edwin Osman MD at [address] [facility] ([address] [place] [address])      age 37    HYSTERECTOMY, VAGINAL      KIDNEY STONE SURGERY      OVARY REMOVAL       Current Outpatient Medications   Medication Sig Dispense Refill    prednisoLONE acetate (PRED FORTE) 1 % ophthalmic suspension INSTILL 1 DROP INTO LEFT EYE 4 TIMES DAILY FOR 1 WEEK THEN THREE TIMES DAILY FOR 1 WEEK, THEN TWICE DAILY FOR 1 WEEK, THEN ONCE DAILY FOR 1 WEEK THEN STOP      spironolactone (ALDACTONE) 25 MG tablet Take 1 tablet by mouth in the morning and 1 tablet in the evening.  180 tablet 1    simvastatin (ZOCOR) 20 MG tablet Take 1 tablet by mouth nightly 90 tablet 1    clopidogrel (PLAVIX) 75 MG tablet Take 1 tablet by mouth daily 90 tablet 3    furosemide (LASIX) 40 MG tablet Take 1 tablet by mouth 2 times daily AND as needed for wt true   Transportation Needs: Not on file   Physical Activity: Not on file   Stress: Not on file   Social Connections: Not on file   Intimate Partner Violence: Not on file   Housing Stability: Not on file     Family History   Problem Relation Age of Onset    Cancer Mother     Heart Disease Father     Breast Cancer Sister 54    Cancer Brother     Breast Cancer Sister 61    Cancer Sister     Cancer Sister     Cancer Sister     Cancer Sister     Cancer Sister     Breast Cancer Niece 62     Review of Systems - General ROS: negative for - chills, fever or fatigue  Psychological ROS: negative for - anxiety and depression  Hematological and Lymphatic ROS: No history of blood clots or bleeding disorder. Respiratory ROS: no cough, shortness of breath, or wheezing  Cardiovascular ROS: no chest pain or dyspnea on exertion, tolerates a flight of stairs, vacuuming  Gastrointestinal ROS: no abdominal pain, change in bowel habits, or black or bloody stools  Genito-Urinary ROS: no dysuria, trouble voiding, or hematuria  Musculoskeletal ROS: negative for - muscle pain or muscular weakness, positive right knee pain  Neurological ROS: negative for - headaches, numbness/tingling, seizures or weakness  Dermatological ROS: negative for - rash or skin lesion changes    Blood pressure 124/82, pulse 90, temperature 98.3 °F (36.8 °C), height 5' 4\" (1.626 m), weight 186 lb 3.2 oz (84.5 kg), SpO2 95 %, not currently breastfeeding.     Physical Examination: General appearance -alert, well appearing, and in no distress  Mental status - alert, oriented to person, place, and time  Head - atraumatic, normocephalic  Eyes - pupils equal extraocular muscles intact  Ears - external ears are normal, hearing is grossly normal  Chest - clear to auscultation, no wheezes, rales or rhonchi, symmetric air entry  Heart - normal rate, regular rhythm, normal S1, S2, no murmurs, rubs, clicks or gallops  Abdomen -soft, nontender, nondistended  Neurological - alert, oriented, cranial nerves II-XII intact, motor and sensation are grossly intact bilateral upper and lower extremities. Extremities - peripheral pulses normal, no pedal edema, no clubbing or cyanosis  Skin - warm and dry    Diagnostic data:   I have reviewed recent diagnostic testing including labs with patient today. Assessment and Plan:     Diagnosis Orders   1. CHF (NYHA class II, ACC/AHA stage C) ([facility])  spironolactone (ALDACTONE) 25 MG tablet    Basic Metabolic Panel      2. Coronary artery disease involving native coronary artery of native heart without angina pectoris        3. Hypercholesterolemia  simvastatin (ZOCOR) 20 MG tablet    ALT    LDL Cholesterol, Direct      4. Primary hypertension          This is a 80-year-old female with PMHx of CAD, HTN, CKD stage II, CHF with an EF of 50% coming in for a 4 week follow-up. On today's visit pt had no acute complaints. Patient was supposed to increased her aldactone and Simvastatin to BID but due to confusion patient did not increaser her medications. Additionally patient did not get her labs drawn prior to today's visit. Patient has no concerns today. She denies fever, chills, sob, chest pain, or abdominal pain. Patient does have right knee pain. She follows with [facility] for this. Patient stats the cortisone injections have stopped working recently. Additionally she adds that her orthopedist will not due surgery on her right now due to her heart. CAD:  -Denies any recent angina  -Patient is on a statin and Plavix.   -No recent chest pain.   -Has taking SL nitro 1 time since her PCI in 1/2022     Essential hypertension:   - Patient regularly checks her blood pressure with a SBP of ~120s   - /82 on this visit  - She is on Lasix 40 mg twice daily and Lopressor 25 mg twice daily and Aldactone. - Aldactone increased to 25 mg twice daily now from once a day  -Patient is unsure if she has increased her medications, she will go home and recheck. -Patient will either start or continue aldactone BID     HLD:   -Patient's last Lipid panel shows total cholesterol of 211, triglycerides 191, HDL 56, . On 10/13/2022  - Simvastatin 10 mg increased to BID at last visit. She did not increase her medication'  - Order for Ktizpoawiyn50 mg once daily was ordered      CHF NYHA class II:  - Most recent echo of 50-55%  - Successful PCI of the PDA in January 2022.  - Patient is on fluid restrictions of 2 L salt intake of 2000 mg/day. - Patient is on no pressors Lasix, potassium tablets, Imdur, Aldactone, and Plavix. - Follows with Dr. Lamont Hines in cardiology. Arthritis:  - Patient gets cortisone shots every 3 months and both knees. Recently stopped working.  - Most recent shot was done in March. - No acute pain on this visit, has chronic pain  - Discussed with patient to follow up with Dr. Lamont Hines for cardiac clearance for possible knee replacement surgery    Dr. Danial La. [id number]  PGY-1,  resident    Staffed with: Dr. Dede Finn [facility]  [address]  Dept: [phone]  Dept Fax: [phone] : [phone]

## 2022-11-10 NOTE — TELEPHONE ENCOUNTER
Pre op Risk Assessment    Procedure Knee Replacement  Physician [facility]  Date of surgery/procedure TBD    Last OV 7-11-22  Last Stress 6-22-20  Last Echo 1-18-22  Last Cath 1-18-22  Last Stent 1-18-22  Is patient on blood thinners Plavix  Hold Meds/how many days ?     Fax: [phone]    Pre Op requested by [facility]

## 2022-12-08 ENCOUNTER — OFFICE VISIT (OUTPATIENT)
Dept: INTERNAL MEDICINE CLINIC | Age: 77
End: 2022-12-08

## 2022-12-08 ENCOUNTER — HOSPITAL ENCOUNTER (OUTPATIENT)
Age: 77
Discharge: HOME | End: 2022-12-08
Payer: MEDICARE

## 2022-12-08 VITALS
HEIGHT: 64 IN | SYSTOLIC BLOOD PRESSURE: 112 MMHG | TEMPERATURE: 97.2 F | BODY MASS INDEX: 30.7 KG/M2 | DIASTOLIC BLOOD PRESSURE: 68 MMHG | HEART RATE: 84 BPM | WEIGHT: 179.8 LBS

## 2022-12-08 DIAGNOSIS — N18.2 CKD (CHRONIC KIDNEY DISEASE) STAGE 2, GFR 60-89 ML/MIN: ICD-10-CM

## 2022-12-08 DIAGNOSIS — E78.00 HYPERCHOLESTEROLEMIA: ICD-10-CM

## 2022-12-08 DIAGNOSIS — Z12.31 BREAST CANCER SCREENING BY MAMMOGRAM: ICD-10-CM

## 2022-12-08 DIAGNOSIS — I50.9 CHF (NYHA CLASS II, ACC/AHA STAGE C) (HCC): Primary | ICD-10-CM

## 2022-12-08 DIAGNOSIS — Z78.0 POST-MENOPAUSAL: ICD-10-CM

## 2022-12-08 DIAGNOSIS — I50.9 CHF (NYHA CLASS II, ACC/AHA STAGE C) (HCC): ICD-10-CM

## 2022-12-08 LAB
ALT SERPL-CCNC: 12 U/L (ref 11–66)
ANION GAP SERPL CALCULATED.3IONS-SCNC: 12 MEQ/L (ref 8–16)
BUN BLDV-MCNC: 18 MG/DL (ref 7–22)
CALCIUM SERPL-MCNC: 9.4 MG/DL (ref 8.5–10.5)
CHLORIDE BLD-SCNC: 100 MEQ/L (ref 98–111)
CHOLESTEROL, TOTAL: 159 MG/DL (ref 100–199)
CO2: 30 MEQ/L (ref 23–33)
CREAT SERPL-MCNC: 1.2 MG/DL (ref 0.4–1.2)
GFR SERPL CREATININE-BSD FRML MDRD: 47 ML/MIN/1.73M2
GLUCOSE BLD-MCNC: 104 MG/DL (ref 70–108)
HDLC SERPL-MCNC: 49 MG/DL
LDL CHOLESTEROL CALCULATED: 81 MG/DL
LDL CHOLESTEROL DIRECT: 89.39 MG/DL
POTASSIUM SERPL-SCNC: 4.1 MEQ/L (ref 3.5–5.2)
SODIUM BLD-SCNC: 142 MEQ/L (ref 135–145)
TRIGL SERPL-MCNC: 144 MG/DL (ref 0–199)

## 2022-12-08 PROCEDURE — 83721 ASSAY OF BLOOD LIPOPROTEIN: CPT

## 2022-12-08 PROCEDURE — 80048 BASIC METABOLIC PNL TOTAL CA: CPT

## 2022-12-08 PROCEDURE — 84460 ALANINE AMINO (ALT) (SGPT): CPT

## 2022-12-08 PROCEDURE — 36415 COLL VENOUS BLD VENIPUNCTURE: CPT

## 2022-12-08 PROCEDURE — 80061 LIPID PANEL: CPT

## 2022-12-08 NOTE — PROGRESS NOTES
in the last month. Patient is still having CP on exertion as well, not GERD symptoms. Pain occurs when she is walking long distances, or needs to climb stairs and pain is relieved with rest within minutes. Cardiac cath was done at beginning of year, with a stent placed in the PDA and follows with Dr. Maribel Lawton. Past Medical History:   Diagnosis Date    CAD (coronary artery disease)     PCI     Chronic kidney disease     stones    Hyperlipidemia     Hypertension      Past Surgical History:   Procedure Laterality Date    APPENDECTOMY      CAROTID STENT PLACEMENT      CHOLECYSTECTOMY      COLONOSCOPY      CYSTO/URETERO/PYELOSCOPY, CALCULUS TX N/A 5/8/2019    CYSTO, LEFT URETEROSCOPY, URETERORENOSCOPY, LASER LITOHTRIPSY, BASKET RETRIEVAL OF STONE FRAGMENTS, LEFT URETERAL STENT performed by Edwin Osman MD at [place] [address] ([address] [place] [address])      age 37    HYSTERECTOMY, VAGINAL      KIDNEY STONE SURGERY      OVARY REMOVAL       Current Outpatient Medications   Medication Sig Dispense Refill    prednisoLONE acetate (PRED FORTE) 1 % ophthalmic suspension INSTILL 1 DROP INTO LEFT EYE 4 TIMES DAILY FOR 1 WEEK THEN THREE TIMES DAILY FOR 1 WEEK, THEN TWICE DAILY FOR 1 WEEK, THEN ONCE DAILY FOR 1 WEEK THEN STOP      spironolactone (ALDACTONE) 25 MG tablet Take 1 tablet by mouth in the morning and 1 tablet in the evening.  180 tablet 1    simvastatin (ZOCOR) 20 MG tablet Take 1 tablet by mouth nightly 90 tablet 1    clopidogrel (PLAVIX) 75 MG tablet Take 1 tablet by mouth daily 90 tablet 3    furosemide (LASIX) 40 MG tablet Take 1 tablet by mouth 2 times daily AND as needed for wt gain, swelling 210 tablet 3    metoprolol tartrate (LOPRESSOR) 25 MG tablet Take 1 tablet by mouth 2 times daily 180 tablet 3    potassium chloride (KLOR-CON) 10 MEQ extended release tablet Take 1 tablet by mouth twice daily 180 tablet 3    neomycin-polymyxin-dexameth (MAXITROL) 3.5-31122-8.1 ophthalmic suspension INSTILL 1 DROP INTO LEFT EYE 4 TIMES DAILY FOR 4 DAYS THEN TWICE A DAY FOR 4 DAYS THEN ONCE DAILY FOR 4 DAYS      pantoprazole (PROTONIX) 40 MG tablet Take 1 tablet by mouth in the morning. 30 minutes prior to meal. 90 tablet 3    isosorbide mononitrate (IMDUR) 30 MG extended release tablet Take 2 tablets by mouth 2 times daily 180 tablet 1    albuterol sulfate HFA (VENTOLIN HFA) 108 (90 Base) MCG/ACT inhaler Inhale 2 puffs into the lungs every 6 hours as needed for Wheezing or Shortness of Breath 1 each 3    nitroGLYCERIN (NITROSTAT) 0.4 MG SL tablet Place 1 tablet under the tongue every 5 minutes as needed for Chest pain (Maximum 3 doses) 25 tablet 1     No current facility-administered medications for this visit. Allergies   Allergen Reactions    Codeine Other (See Comments)     Breathing difficulties     Social History     Socioeconomic History    Marital status:       Spouse name: Ramirez Colindres    Number of children: 2    Years of education: Not on file    Highest education level: Not on file   Occupational History    Not on file   Tobacco Use    Smoking status: Never    Smokeless tobacco: Never   Vaping Use    Vaping Use: Never used   Substance and Sexual Activity    Alcohol use: Not Currently    Drug use: Never    Sexual activity: Not Currently   Other Topics Concern    Not on file   Social History Narrative    Not on file     Social Determinants of Health     Financial Resource Strain: Low Risk     Difficulty of Paying Living Expenses: Not hard at all   Food Insecurity: No Food Insecurity    Worried About Running Out of Food in the Last Year: Never true    Ran Out of Food in the Last Year: Never true   Transportation Needs: Not on file   Physical Activity: Not on file   Stress: Not on file   Social Connections: Not on file   Intimate Partner Violence: Not on file   Housing Stability: Not on file     Family History   Problem Relation Age of Onset    Cancer Mother     Heart Disease Father     Breast Cancer Sister 54 Cancer Brother     Breast Cancer Sister 61    Cancer Sister     Cancer Sister     Cancer Sister     Cancer Sister     Cancer Sister     Breast Cancer Niece 62     Review of Systems - General ROS: negative for - chills, fever or fatigue  Psychological ROS: negative for - anxiety and depression  Hematological and Lymphatic ROS: No history of blood clots or bleeding disorder. Respiratory ROS: no cough, shortness of breath, or wheezing  Cardiovascular ROS: no chest pain or dyspnea on exertion, tolerates a flight of stairs, vacuuming  Gastrointestinal ROS: no abdominal pain, change in bowel habits, or black or bloody stools  Genito-Urinary ROS: no dysuria, trouble voiding, or hematuria  Musculoskeletal ROS: negative for - muscle pain or muscular weakness, positive right knee pain  Neurological ROS: negative for - headaches, numbness/tingling, seizures or weakness  Dermatological ROS: negative for - rash or skin lesion changes    Blood pressure 112/68, pulse 84, temperature 97.2 °F (36.2 °C), height 5' 4\" (1.626 m), weight 179 lb 12.8 oz (81.6 kg), not currently breastfeeding. Physical Examination: General appearance -alert, well appearing, and in no distress  Mental status - alert, oriented to person, place, and time  Head - atraumatic, normocephalic  Eyes - pupils equal extraocular muscles intact  Ears - external ears are normal, hearing is grossly normal  Chest - clear to auscultation, no wheezes, rales or rhonchi, symmetric air entry  Heart - normal rate, regular rhythm, normal S1, S2, no murmurs, rubs, clicks or gallops  Abdomen -soft, nontender, nondistended  Neurological - alert, oriented, cranial nerves II-XII intact, motor and sensation are grossly intact bilateral upper and lower extremities.   Extremities - peripheral pulses normal, no pedal edema, no clubbing or cyanosis  Skin - warm and dry    Diagnostic data:   I have reviewed recent diagnostic testing including labs with patient today.    Assessment and Plan:     Diagnosis Orders   1. Coronary artery disease involving native coronary artery of native heart without angina pectoris        2. Primary hypertension        3. Hypercholesterolemia        4. CHF (NYHA class II, ACC/AHA stage C) [facility])          67-year-old female with PMHx of CAD, HTN, CKD stage II, CHF with an EF of 50% coming in for a 4 week follow-up. On today's visit pt had no acute complaints. Patient increased her aldactone and Simvastatin to BID. Patient has no concerns today. She denies fever, chills, sob, chest pain, or abdominal pain. Patient does have right knee pain. She reports her surgery for knee replacement in January 2023. Essential hypertension:   - Patient regularly checks her blood pressure with a SBP of ~120s   - /68 on this visit  - She is on Lasix 40 mg twice daily and Lopressor 25 mg twice daily and Aldactone. - Continue Aldactone 25 mg BID     HLD:   -Patient's last Lipid panel shows total cholesterol of 159, triglycerides 144, HDL 49, LDL 81. On 12/08/2022  - Simvastatin 20 mg continue. Lipid profile WNL limits. - Continue [id number] mg once daily    CHF NYHA class II:  - Most recent echo of 50-55%  - Successful PCI of the PDA in January 2022.  - Patient is on fluid restrictions of 2 L salt intake of 2000 mg/day. - Patient is on no pressors. Lasix, potassium tablets, Imdur, Aldactone, and Plavix. - Follows with Dr. Nicholas Sierra in cardiology. CAD:  -Denies any recent angina  -Patient is on a statin and Plavix.   -No recent chest pain.   -Has taking SL nitro 1 time since her PCI in 1/2022     Arthritis:  -Patient gets cortisone shots every 3 months, bilateral  -Most recent shot was done in November 2022.  -No acute pain at visit, has chronic pain  -Patient saw Dr. Nicholas Sierra for cardiac clearance in 11/2022.  -Knee replacement surgery scheduled for January 2023       Dr. Ghazal Gomes.  [address]  PGY-1, IM resident    Staffed with: Dr. Igor Trevino A    [facility] PROFESSIONAL SERVS  [facility] INTERNAL MEDICINE  [address]  Dept: [phone]  Dept Fax: [phone] : [phone]

## 2023-01-10 ENCOUNTER — OFFICE VISIT (OUTPATIENT)
Dept: CARDIOLOGY CLINIC | Age: 78
End: 2023-01-10
Payer: MEDICARE

## 2023-01-10 VITALS
BODY MASS INDEX: 30.9 KG/M2 | SYSTOLIC BLOOD PRESSURE: 108 MMHG | HEIGHT: 64 IN | DIASTOLIC BLOOD PRESSURE: 60 MMHG | OXYGEN SATURATION: 97 % | WEIGHT: 181 LBS | HEART RATE: 80 BPM

## 2023-01-10 DIAGNOSIS — I10 ESSENTIAL HYPERTENSION: ICD-10-CM

## 2023-01-10 DIAGNOSIS — I25.10 CORONARY ARTERY DISEASE INVOLVING NATIVE CORONARY ARTERY OF NATIVE HEART WITHOUT ANGINA PECTORIS: ICD-10-CM

## 2023-01-10 DIAGNOSIS — I50.32 CHF NYHA CLASS II, CHRONIC, DIASTOLIC (HCC): Primary | ICD-10-CM

## 2023-01-10 PROCEDURE — 3078F DIAST BP <80 MM HG: CPT | Performed by: NURSE PRACTITIONER

## 2023-01-10 PROCEDURE — 99214 OFFICE O/P EST MOD 30 MIN: CPT | Performed by: NURSE PRACTITIONER

## 2023-01-10 PROCEDURE — G8400 PT W/DXA NO RESULTS DOC: HCPCS | Performed by: NURSE PRACTITIONER

## 2023-01-10 PROCEDURE — 1090F PRES/ABSN URINE INCON ASSESS: CPT | Performed by: NURSE PRACTITIONER

## 2023-01-10 PROCEDURE — 1036F TOBACCO NON-USER: CPT | Performed by: NURSE PRACTITIONER

## 2023-01-10 PROCEDURE — G8484 FLU IMMUNIZE NO ADMIN: HCPCS | Performed by: NURSE PRACTITIONER

## 2023-01-10 PROCEDURE — G8427 DOCREV CUR MEDS BY ELIG CLIN: HCPCS | Performed by: NURSE PRACTITIONER

## 2023-01-10 PROCEDURE — 1123F ACP DISCUSS/DSCN MKR DOCD: CPT | Performed by: NURSE PRACTITIONER

## 2023-01-10 PROCEDURE — G8417 CALC BMI ABV UP PARAM F/U: HCPCS | Performed by: NURSE PRACTITIONER

## 2023-01-10 PROCEDURE — 3074F SYST BP LT 130 MM HG: CPT | Performed by: NURSE PRACTITIONER

## 2023-01-10 ASSESSMENT — ENCOUNTER SYMPTOMS
COUGH: 0
SHORTNESS OF BREATH: 0
ABDOMINAL DISTENTION: 0

## 2023-01-10 NOTE — PATIENT INSTRUCTIONS
You may receive a survey regarding the care you received during your visit. Your input is valuable to us. We encourage you to complete and return your survey. We hope you will choose us in the future for your healthcare needs.     Continue:  Continue current medications  Daily weights and record  Fluid restriction of 2 Liters per day  Limit sodium in diet to around 5570-6832 mg/day  Monitor BP  Activity as tolerated     Call the Heart Failure Clinic for any of the following symptoms: [phone]  Weight gain of 2-3 pounds in 1 day or 5 pounds in 1 week  Increased shortness of breath  Shortness of breath while laying down  Cough  Chest pain  Swelling in feet, ankles or legs  Tenderness or bloating in the abdomen  Fatigue   Decreased appetite or nausea   Confusion

## 2023-01-10 NOTE — PROGRESS NOTES
Heart Failure Clinic       Visit Date: 1/10/2023  Cardiologist:  Dr. Nicholas Sierra  Primary Care Physician: Dr. Rupesh Lynn MD    Mishel Santiago is a 69 y.o. female who presents today for:  Chief Complaint   Patient presents with    Congestive Heart Failure       HPI:   Mishel Santiago is a 69 y.o. female who presents to the office for a follow up patient visit in the heart failure clinic. Accompanied by no one    TYPE HF: Diastolic (EF 67-19%)  Device: no  HX: HTN, HLD,CAD (PCI, 1/2022), Renal stone Thirkarina Moise), significant family hx Cancers  ? ?GENNARO had sleep study in past - not tolerate mask     Dry Wt: 175-180     Hospitalization:  None  New pt to [facility] in June  LHC/RHC 8/2020 - elevated pressures/volume overload - started on Lasix 40 BID   Referred to CHF clinic  OV 9/14/20 - added Aldactone  OV [place] 1/14/21 = no changes    1/2022 - worsening CP, HORTON - ECHO done - no changes  L/RHC - PCI to PDA. RH normal pressures. Referred to Pulmonary - saw Pulmonary started on Advair. Referred to Neuro for memory issues - seeing in Sept 7/2022:  184# - doing ok. Pretty active. Some memory issues    Today:  181#  Doing pretty well   Having knee surgery next month  No fluid on exam.  No concerns voiced.   Urine output good  Walked from entrance    Patient has:  Chest Pain: no  SOB: no  Orthopnea/PND: no  GENNARO: no  Edema: on/off - wearing compression hose  Fatigue: no  Abdominal bloating: no  Cough: no  Appetite: good  Home weight: stable  Home blood pressure: stable    Past Medical History:   Diagnosis Date    CAD (coronary artery disease)     PCI     Chronic kidney disease     stones    Hyperlipidemia     Hypertension      Past Surgical History:   Procedure Laterality Date    APPENDECTOMY      CAROTID STENT PLACEMENT      CHOLECYSTECTOMY      COLONOSCOPY      CYSTO/URETERO/PYELOSCOPY, CALCULUS TX N/A 5/8/2019    CYSTO, LEFT URETEROSCOPY, URETERORENOSCOPY, LASER LITOHTRIPSY, BASKET RETRIEVAL OF STONE FRAGMENTS, LEFT URETERAL STENT performed by Conner Berkowitz MD at [facility] OR    HYSTERECTOMY (CERVIX STATUS UNKNOWN)      age 41    HYSTERECTOMY, VAGINAL      KIDNEY STONE SURGERY      OVARY REMOVAL       Family History   Problem Relation Age of Onset    Cancer Mother     Heart Disease Father     Breast Cancer Sister 55    Cancer Brother     Breast Cancer Sister 60    Cancer Sister     Cancer Sister     Cancer Sister     Cancer Sister     Cancer Sister     Breast Cancer Niece 58     Social History     Tobacco Use    Smoking status: Never    Smokeless tobacco: Never   Substance Use Topics    Alcohol use: Not Currently     Current Outpatient Medications   Medication Sig Dispense Refill    prednisoLONE acetate (PRED FORTE) 1 % ophthalmic suspension INSTILL 1 DROP INTO LEFT EYE 4 TIMES DAILY FOR 1 WEEK THEN THREE TIMES DAILY FOR 1 WEEK, THEN TWICE DAILY FOR 1 WEEK, THEN ONCE DAILY FOR 1 WEEK THEN STOP      spironolactone (ALDACTONE) 25 MG tablet Take 1 tablet by mouth in the morning and 1 tablet in the evening. 180 tablet 1    simvastatin (ZOCOR) 20 MG tablet Take 1 tablet by mouth nightly 90 tablet 1    clopidogrel (PLAVIX) 75 MG tablet Take 1 tablet by mouth daily 90 tablet 3    furosemide (LASIX) 40 MG tablet Take 1 tablet by mouth 2 times daily AND as needed for wt gain, swelling 210 tablet 3    metoprolol tartrate (LOPRESSOR) 25 MG tablet Take 1 tablet by mouth 2 times daily 180 tablet 3    potassium chloride (KLOR-CON) 10 MEQ extended release tablet Take 1 tablet by mouth twice daily 180 tablet 3    neomycin-polymyxin-dexameth (MAXITROL) 3.5-86703-0.1 ophthalmic suspension INSTILL 1 DROP INTO LEFT EYE 4 TIMES DAILY FOR 4 DAYS THEN TWICE A DAY FOR 4 DAYS THEN ONCE DAILY FOR 4 DAYS      pantoprazole (PROTONIX) 40 MG tablet Take 1 tablet by mouth in the morning. 30 minutes prior to meal. 90 tablet 3    isosorbide mononitrate (IMDUR) 30 MG extended release tablet Take 2 tablets by mouth 2 times daily 180 tablet 1     albuterol sulfate HFA (VENTOLIN HFA) 108 (90 Base) MCG/ACT inhaler Inhale 2 puffs into the lungs every 6 hours as needed for Wheezing or Shortness of Breath 1 each 3    nitroGLYCERIN (NITROSTAT) 0.4 MG SL tablet Place 1 tablet under the tongue every 5 minutes as needed for Chest pain (Maximum 3 doses) 25 tablet 1     No current facility-administered medications for this visit. Allergies   Allergen Reactions    Codeine Other (See Comments)     Breathing difficulties       SUBJECTIVE:   Review of Systems   Constitutional:  Negative for activity change, appetite change and fatigue. Respiratory:  Negative for cough and shortness of breath. Cardiovascular:  Negative for chest pain, palpitations and leg swelling. Gastrointestinal:  Negative for abdominal distention. Neurological:  Negative for weakness, light-headedness and headaches. Hematological:  Negative for adenopathy. Psychiatric/Behavioral:  Negative for sleep disturbance. OBJECTIVE:   Today's Vitals:  /60   Pulse 80   Ht 5' 4\" (1.626 m)   Wt 181 lb (82.1 kg)   SpO2 97%   BMI 31.07 kg/m²     Physical Exam  Vitals reviewed. Constitutional:       General: She is not in acute distress. Appearance: Normal appearance. She is well-developed. She is not diaphoretic. HENT:      Head: Normocephalic and atraumatic. Eyes:      Conjunctiva/sclera: Conjunctivae normal.   Neck:      Comments: No JVD  Cardiovascular:      Rate and Rhythm: Normal rate and regular rhythm. Heart sounds: Normal heart sounds. No murmur heard. Pulmonary:      Effort: Pulmonary effort is normal. No respiratory distress. Breath sounds: Normal breath sounds. No wheezing or rales. Abdominal:      General: Bowel sounds are normal. There is no distension. Palpations: Abdomen is soft. Tenderness: There is no abdominal tenderness. Musculoskeletal:         General: Normal range of motion.       Cervical back: Normal range of motion and neck supple. Right lower leg: No edema. Left lower leg: No edema. Skin:     General: Skin is warm and dry. Capillary Refill: Capillary refill takes less than 2 seconds. Neurological:      Mental Status: She is alert and oriented to person, place, and time. Coordination: Coordination normal.   Psychiatric:         Behavior: Behavior normal.     Wt Readings from Last 3 Encounters:   01/10/23 181 lb (82.1 kg)   12/08/22 179 lb 12.8 oz (81.6 kg)   11/10/22 186 lb 3.2 oz (84.5 kg)     BP Readings from Last 3 Encounters:   01/10/23 108/60   12/08/22 112/68   11/10/22 124/82     Pulse Readings from Last 3 Encounters:   01/10/23 80   12/08/22 84   11/10/22 90     Body mass index is 31.07 kg/m². ECHO   Summary   Ejection fraction was estimated at 50-55%. IVC size is within normal limits with normal respiratory phasic      Signature      ----------------------------------------------------------------   Electronically signed by Lani Carreon MD (Interpreting   physician) on 01/18/2022 at 05:56 PM      ECHO:     Summary   Ejection fraction was estimated at 50-55%. E/A flow reversal noted. Suggestive of diastolic. Ascending aorta = 3.6 cm. IVC size is within normal limits with normal respiratory phasic changes. Signature      ----------------------------------------------------------------   Electronically signed by Lani Carreon MD (Interpreting   physician) on 06/22/2020 at 04:15 PM   ----------------------------------------------------------------    CATH 1/2022  RIGHT HEART CATHETERIZATION:  RA 4, RV 26/3, PA 23/9 with a mean of 14,  wedge pressure 5, PA saturation 73%. SUMMARY:  Successful PCI of the PDA with two overlapping drug-eluting  stents; euvolemic right heart cath pressures with preserved cardiac  output. PLAN:  1. Bedrest.  2.  Optimal medical therapy. 3.  Risk factor management. 4.  Routine access site care. 5.  DAPT. 6.  Guideline-directed therapy for CAD.   7. Guideline-directed therapy for CHF. 8.  Follow up in the office in two to four weeks postprocedure. All the above were explained with the patient and the patient's family. They are agreeable and amenable to the plan. Max Gaines MD   D: 01/19/2022 11:13:09        CATH:   SUMMARY:  PDA with 70% to 80% stenosis; elevated right heart cath  pressures and elevated EDP suggestive of volume overload with preserved  cardiac output. RIGHT HEART CATHETERIZATION:  RA 16, RV 41/20, PA 41/23 with a mean of  29, wedge pressure 20, PA saturation is 74%, cardiac output is 4,  cardiac index 2.1. PLAN:  1. Bedrest.  2.  Optimal medical therapy. 3.  Risk factor management. 4.  Routine access site care. 5.  Aggressive diuresis, increase Lasix. Follow up with heart failure  clinic. Avoid salt and reduce fluid intake. We will reassess symptoms  at that time. She has a negative ischemic evaluation. These findings  are likely more incidental rather than cause of her cardiac  decompensation. Majority of her shortness of breath appears to be  volume and pressure related. If she has symptoms despite normalizing  her volume status and her blood pressure, then I would consider  intervention of the PDA. All the above was explained to the patient and the patient's family. They were agreeable and amenable to the plan. Max Gaines MD     D: 08/16/2020 9:51:16         HOLTER  SUMMARY:  No significant abnormalities explained the palpitations, very  occasional PACs and PVCs.      Max Gaines MD     D: 12/03/2020 5:54:16        Results reviewed:  BNP: No results found for: BNP  CBC:   Lab Results   Component Value Date/Time    WBC 6.2 08/18/2022 03:33 PM    RBC 4.33 08/18/2022 03:33 PM    HGB 14.5 08/18/2022 03:33 PM    HCT 44.5 08/18/2022 03:33 PM     08/18/2022 03:33 PM     CMP:    Lab Results   Component Value Date/Time     12/08/2022 01:03 PM    K 4.1 12/08/2022 01:03 PM    K 4.2 01/18/2022 11:00 AM     12/08/2022 01:03 PM    CO2 30 12/08/2022 01:03 PM    BUN 18 12/08/2022 01:03 PM    CREATININE 1.2 12/08/2022 01:03 PM    LABGLOM 47 12/08/2022 01:03 PM    GLUCOSE 104 12/08/2022 01:03 PM    CALCIUM 9.4 12/08/2022 01:03 PM     Hepatic Function Panel:    Lab Results   Component Value Date/Time    ALKPHOS 53 08/13/2020 06:59 AM    ALT 12 12/08/2022 01:03 PM    AST 12 08/13/2020 06:59 AM    PROT 6.0 08/13/2020 06:59 AM    BILITOT 0.4 08/13/2020 06:59 AM    LABALBU 3.8 08/13/2020 06:59 AM     Magnesium:    Lab Results   Component Value Date/Time    MG 2.0 11/19/2020 10:16 AM     PT/INR:    Lab Results   Component Value Date/Time    INR 1.12 01/18/2022 11:00 AM     Lipids:    Lab Results   Component Value Date/Time    TRIG 144 12/08/2022 01:03 PM    HDL 49 12/08/2022 01:03 PM    LDLCALC 81 12/08/2022 01:03 PM    LDLDIRECT 89.39 12/08/2022 01:03 PM       ASSESSMENT AND PLAN:      Diagnosis Orders   1. CHF NYHA class II, chronic, diastolic (HCC)  Basic Metabolic Panel      2. Coronary artery disease involving native coronary artery of native heart without angina pectoris        3. Essential hypertension            Continue:  GDMT:              ACE/ARB/ARNI - no              BB - Lopressor 25 BID              Diuretic - Lasix 40 BID, Kcl 10 BID              Vasodilator - Imdur 60 BID               MRA - Aldactone 25/BID  Digoxin - no              Antiarrhythmic - no   Other - Plavix  Diastolic (87%)  CAD s/p PCI (1/2022)  HTN  Stable, appears Euvolemic  Lab reviewed - stable  Repeat blood work in June  BP/HR stable   No med changes today   Continue diet/fluid adherence  Continue daily wts. F/U w/ Cardiology  F/U in clinic in 6 months    Tolerating above noted HF meds, no ill side effects noted. Will continue to monitor kidney function and electrolytes. Will optimize as tolerated. Pt is compliant w/ medications.     Total visit time of 30 minutes has been spent with patient on education of symptoms, management, medication, and plan of care; as well as review of chart: labs, ECHO, radiology reports, etc.   I personally spent more then 50% of the appt time face to face with the patient. Daily weights  Fluid restriction of 2 Liters per day  Limit sodium in diet to around 0061-5579 mg/day  Monitor BP  Activity as tolerated     Patient was instructed to call the [employer] [facility] for any changes in symptoms as noted in AVS.      Return in about 1 year (around 1/10/2024). or sooner if needed     Patient given educational materials - see patient instructions. We discussed the importance of weighing oneself and recording daily. We also discussed the importance of a low sodium diet, higher sodium foods to avoid and better low sodium food options. Patient verbalizes understanding of plan of care using teach back method, and is agreeable to the treatment plan.        Electronically signed by NASIM Enciso CNP on 1/10/2023 at 2:57 PM

## 2023-01-24 ENCOUNTER — TELEPHONE (OUTPATIENT)
Dept: CARDIOLOGY CLINIC | Age: 78
End: 2023-01-24

## 2023-01-24 NOTE — TELEPHONE ENCOUNTER
Edel, patient daughter, calling. The order for Imdur is 30 mg 2 tablets twice daily. She has only been taking 1 tablet twice daily and Edel just realized this. She has been doing fine taking 30 mg BID. She has surgery for a total hip on Friday and she didn't want to change anything and cause more problems. Please advise.

## 2023-01-25 RX ORDER — ISOSORBIDE MONONITRATE 30 MG/1
30 TABLET, EXTENDED RELEASE ORAL 2 TIMES DAILY
COMMUNITY

## 2023-02-03 ENCOUNTER — TELEPHONE (OUTPATIENT)
Dept: CARDIOLOGY CLINIC | Age: 78
End: 2023-02-03

## 2023-02-03 NOTE — TELEPHONE ENCOUNTER
----- [facility] Jesus Pickett sent at 2/3/2023  1:04 PM EST -----  Regarding: Stents  This is Laci Minor daughter Oksana Levy. Can you please tell me how many stents she has.    Thanks

## 2023-03-07 ENCOUNTER — TELEPHONE (OUTPATIENT)
Dept: CARDIOLOGY CLINIC | Age: 78
End: 2023-03-07

## 2023-03-07 NOTE — TELEPHONE ENCOUNTER
Rajinder De Leon called to schedule an appt for [place] for Mian Plascencia for low HTN, she is being DC 03-08 from Rajinder De Leon. Please call Rajinder De Leon at [phone], and ask for [facility] to get an appt scheduled with Jake.

## 2023-03-09 ENCOUNTER — OFFICE VISIT (OUTPATIENT)
Dept: CARDIOLOGY CLINIC | Age: 78
End: 2023-03-09

## 2023-03-09 VITALS
BODY MASS INDEX: 29.88 KG/M2 | SYSTOLIC BLOOD PRESSURE: 103 MMHG | DIASTOLIC BLOOD PRESSURE: 63 MMHG | WEIGHT: 175 LBS | HEART RATE: 86 BPM | HEIGHT: 64 IN

## 2023-03-09 DIAGNOSIS — I20.8 ANGINA OF EFFORT (HCC): ICD-10-CM

## 2023-03-09 DIAGNOSIS — I50.9 CHF (NYHA CLASS II, ACC/AHA STAGE C) (HCC): Primary | ICD-10-CM

## 2023-03-09 DIAGNOSIS — I25.10 CORONARY ARTERY DISEASE INVOLVING NATIVE CORONARY ARTERY OF NATIVE HEART WITHOUT ANGINA PECTORIS: ICD-10-CM

## 2023-03-09 RX ORDER — MULTIVIT-MIN/IRON/FOLIC ACID/K 18-600-40
CAPSULE ORAL
COMMUNITY

## 2023-03-09 RX ORDER — ONDANSETRON 4 MG/1
TABLET, FILM COATED ORAL
COMMUNITY
Start: 2023-03-08

## 2023-03-09 RX ORDER — ASPIRIN 325 MG
325 TABLET ORAL DAILY
COMMUNITY

## 2023-03-09 NOTE — PROGRESS NOTES
[facility] PROFESSIONAL SERVICES  [facility]   [address]    [address] [address]   Dept: [phone]   Dept Fax: [phone]   Loc: [phone]      Chief Complaint   Patient presents with    Follow-up     Low BP        Cardiologist:  Dr. Balaji Mcclelland  78 yo female presents for low BP. Hx of PDA stenosis, G1DDx, normal EF. A lot of chest pain lately. Worse with exertion and better with rest but does have some at rest as well. Limited by recent knee surgery but still gets up and does stuff around the house frequently. Breathing is okay. Not much issue with this. Not a lot of energy, able to push and keep going. No dizziness. No swelling. Weight is stable. Appetite good. BP has been up and down, mostly lower, metoprolol cut back to 12.5 BID. General:   No fever, no chills, no weight loss, + fatigue  Pulmonary:    + dyspnea, no wheezing  Cardiac:    ++ recent chest pain   GI:     No nausea or vomiting, no abdominal pain  Neuro:      No dizziness or light headedness  Musculoskeletal:  No recent active issues  Extremities:   ++ edema      Past Medical History:   Diagnosis Date    CAD (coronary artery disease)     PCI     Chronic kidney disease     stones    Hyperlipidemia     Hypertension        Allergies   Allergen Reactions    Codeine Other (See Comments)     Breathing difficulties       Current Outpatient Medications   Medication Sig Dispense Refill    aspirin 325 MG tablet Take 325 mg by mouth daily      Cholecalciferol (VITAMIN D) 50 MCG (2000 UT) CAPS capsule Take by mouth      ondansetron (ZOFRAN) 4 MG tablet       Docusate Sodium (COLACE PO) Take by mouth      isosorbide mononitrate (IMDUR) 30 MG extended release tablet Take 30 mg by mouth in the morning and at bedtime      spironolactone (ALDACTONE) 25 MG tablet Take 1 tablet by mouth in the morning and 1 tablet in the evening.  180 tablet 1    simvastatin (ZOCOR) 20 MG tablet Take 1 tablet by mouth nightly 90 tablet 1    clopidogrel (PLAVIX) 75 MG tablet Take 1 tablet by mouth daily 90 tablet 3    furosemide (LASIX) 40 MG tablet Take 1 tablet by mouth 2 times daily AND as needed for wt gain, swelling 210 tablet 3    metoprolol tartrate (LOPRESSOR) 25 MG tablet Take 1 tablet by mouth 2 times daily 180 tablet 3    potassium chloride (KLOR-CON) 10 MEQ extended release tablet Take 1 tablet by mouth twice daily 180 tablet 3    pantoprazole (PROTONIX) 40 MG tablet Take 1 tablet by mouth in the morning. 30 minutes prior to meal. 90 tablet 3    albuterol sulfate HFA (VENTOLIN HFA) 108 (90 Base) MCG/ACT inhaler Inhale 2 puffs into the lungs every 6 hours as needed for Wheezing or Shortness of Breath 1 each 3    nitroGLYCERIN (NITROSTAT) 0.4 MG SL tablet Place 1 tablet under the tongue every 5 minutes as needed for Chest pain (Maximum 3 doses) 25 tablet 1     No current facility-administered medications for this visit.       Social History     Socioeconomic History    Marital status:      Spouse name: Bernardo    Number of children: 2   Tobacco Use    Smoking status: Never    Smokeless tobacco: Never   Vaping Use    Vaping Use: Never used   Substance and Sexual Activity    Alcohol use: Not Currently    Drug use: Never    Sexual activity: Not Currently     Social Determinants of Health     Financial Resource Strain: Low Risk     Difficulty of Paying Living Expenses: Not hard at all   Food Insecurity: No Food Insecurity    Worried About Running Out of Food in the Last Year: Never true    Ran Out of Food in the Last Year: Never true       Family History   Problem Relation Age of Onset    Cancer Mother     Heart Disease Father     Breast Cancer Sister 55    Cancer Brother     Breast Cancer Sister 60    Cancer Sister     Cancer Sister     Cancer Sister     Cancer Sister     Cancer Sister     Breast Cancer Niece 58       Blood pressure 103/63, pulse 86, height 5' 4\" (1.626 m), weight 175 lb (79.4 kg), not currently breastfeeding.    General:  Well developed, well nourished  Lungs:    Clear to auscultation, no crackles  Heart:    Normal S1 S2, No murmur, rubs, or gallops  Abdomen:   Soft, non tender, no organomegalies, positive bowel sounds  Extremities:   +1 bilat LE edema, no cyanosis, good peripheral pulses  Neurological:   Awake, alert, oriented. No obvious focal deficits  Musculoskeletal:  No obvious deformities    EKG:   TTE  Conclusions      Summary   Ejection fraction was estimated at 50-55%. IVC size is within normal limits with normal respiratory phasic      Signature      ----------------------------------------------------------------   Electronically signed by Girma Alamo MD (Interpreting   physician) on 01/18/2022     [facility]  CORONARY ANGIOGRAM:  LEFT MAIN:  Patent without any significant obstruction. LAD:  Mild luminal irregularities throughout, but no severe obstruction. SUZANNE-2 flow, small diagonal branch without any significant obstruction. LCX:  Previously placed stent in the proximal segment, patent. This  stent continues down to OM1 which is patent. RCA:  No significant obstruction in the proximal and mid segment. Bifurcates into large PDA and PLB. PDA has about 80% to 90% stenosis in  the mid segment. RCA is dominant. INTERVENTION:  Given the findings and presentation, I elected to proceed  with intervention. I exchanged out for a JR4 guiding catheter. I  cannulated RCA without complications. Heparin IV was given. ACT was  confirmed to be above 250 seconds. I wired the lesion using the  Runthrough wire. I then direct stented using a 2.25 x 18 Resolute Haris  QUINCY and stented the lesion. There was distal residual lesion as well to  the stent. I then postdilated the stent with 2.5 x 12 NC balloon up to  16 atmospheres. I then used a 2.0 x 12 Resolute Haris QUINCY distally to  the first stent deployed, deployed this in overlapping fashion at 8  atmospheres.   I postdilated the stent with a 2.0 x 8 NC up to 20  atmospheres. Post-PCI angiography demonstrated SUZANNE-3 flow without any  perforation, dissection, distal embolization, side branch loss, guide or  guidewire-induced trauma. I did use a Guidezilla catheter for extra  support during the case. IMMEDIATE COMPLICATIONS:  None. MEDICATIONS:  See EMR. ACCESS:  Vasc Band used for hemostasis. ESTIMATED BLOOD LOSS:  Less than 50 mL. SUMMARY:  Successful PCI of the PDA with two overlapping drug-eluting  stents; euvolemic right heart cath pressures with preserved cardiac  output. PLAN:  1. Bedrest.  2.  Optimal medical therapy. 3.  Risk factor management. 4.  Routine access site care. 5.  DAPT. 6.  Guideline-directed therapy for CAD. 7.  Guideline-directed therapy for CHF. 8.  Follow up in the office in two to four weeks postprocedure. All the above were explained with the patient and the patient's family. They are agreeable and amenable to the plan. Santana Marcum MD     D: 01/19/2022     Diagnosis Orders   1. CHF (NYHA class II, ACC/AHA stage C) ([facility])  EKG 12 Lead      2. Coronary artery disease involving native coronary artery of native heart without angina pectoris  EKG 12 Lead            Orders Placed This Encounter   Procedures    EKG 12 Lead     Order Specific Question:   Reason for Exam?     Answer: Other         Assessment/Plan:   Chronic diastolic CHF- euvolemic on exam, on OMT with bb/lasix/aldactone/nitro SL. CAD s/p [facility] with PCI to PDA- plavix/bb/nitro SL/imdur/KCl/statin. Cp/? Angina--seems like angina based on history. Will check stress test as her cath showed no significant disease except for PCI of PDA. Cannot titrate nitrates etc as BP is lower. Further recs to follow stress.      Disposition:   Stress test.   Follow up with Dr Gurdeep Charles as scheduled or sooner if needed

## 2023-03-09 NOTE — PROGRESS NOTES
Pt C/O chest pain off and on, sob on exertions, heart palpitations, swelling in ankles      Pt denies  Headache, dizziness,fatigue

## 2023-03-15 ENCOUNTER — TELEPHONE (OUTPATIENT)
Dept: INTERNAL MEDICINE CLINIC | Age: 78
End: 2023-03-15

## 2023-03-15 NOTE — TELEPHONE ENCOUNTER
I tried to call patient to inform them of their appointment. However, their voicemail box is full. This means I will call them later to inform them.

## 2023-03-16 ENCOUNTER — OFFICE VISIT (OUTPATIENT)
Dept: INTERNAL MEDICINE CLINIC | Age: 78
End: 2023-03-16

## 2023-03-16 VITALS
HEART RATE: 86 BPM | WEIGHT: 173.6 LBS | BODY MASS INDEX: 29.64 KG/M2 | DIASTOLIC BLOOD PRESSURE: 60 MMHG | SYSTOLIC BLOOD PRESSURE: 106 MMHG | HEIGHT: 64 IN | TEMPERATURE: 97.2 F | OXYGEN SATURATION: 96 %

## 2023-03-16 DIAGNOSIS — Z00.00 ENCOUNTER FOR MEDICARE ANNUAL WELLNESS EXAM: Primary | ICD-10-CM

## 2023-03-16 DIAGNOSIS — E78.00 HYPERCHOLESTEROLEMIA: ICD-10-CM

## 2023-03-16 PROBLEM — I50.22 CHRONIC SYSTOLIC (CONGESTIVE) HEART FAILURE (HCC): Status: ACTIVE | Noted: 2023-02-06

## 2023-03-16 PROBLEM — E78.5 HYPERLIPIDEMIA, UNSPECIFIED: Status: ACTIVE | Noted: 2023-02-06

## 2023-03-16 RX ORDER — ASPIRIN 325 MG
325 TABLET ORAL DAILY
COMMUNITY
Start: 2023-02-07 | End: 2023-03-16

## 2023-03-16 RX ORDER — MAGNESIUM HYDROXIDE/ALUMINUM HYDROXICE/SIMETHICONE 120; 1200; 1200 MG/30ML; MG/30ML; MG/30ML
30 SUSPENSION ORAL PRN
COMMUNITY
Start: 2023-03-01

## 2023-03-16 SDOH — ECONOMIC STABILITY: INCOME INSECURITY: HOW HARD IS IT FOR YOU TO PAY FOR THE VERY BASICS LIKE FOOD, HOUSING, MEDICAL CARE, AND HEATING?: NOT VERY HARD

## 2023-03-16 SDOH — ECONOMIC STABILITY: HOUSING INSECURITY
IN THE LAST 12 MONTHS, WAS THERE A TIME WHEN YOU DID NOT HAVE A STEADY PLACE TO SLEEP OR SLEPT IN A SHELTER (INCLUDING NOW)?: NO

## 2023-03-16 SDOH — ECONOMIC STABILITY: FOOD INSECURITY: WITHIN THE PAST 12 MONTHS, THE FOOD YOU BOUGHT JUST DIDN'T LAST AND YOU DIDN'T HAVE MONEY TO GET MORE.: NEVER TRUE

## 2023-03-16 SDOH — ECONOMIC STABILITY: FOOD INSECURITY: WITHIN THE PAST 12 MONTHS, YOU WORRIED THAT YOUR FOOD WOULD RUN OUT BEFORE YOU GOT MONEY TO BUY MORE.: NEVER TRUE

## 2023-03-16 ASSESSMENT — PATIENT HEALTH QUESTIONNAIRE - PHQ9
SUM OF ALL RESPONSES TO PHQ9 QUESTIONS 1 & 2: 0
2. FEELING DOWN, DEPRESSED OR HOPELESS: 0
SUM OF ALL RESPONSES TO PHQ QUESTIONS 1-9: 0
1. LITTLE INTEREST OR PLEASURE IN DOING THINGS: 0
SUM OF ALL RESPONSES TO PHQ QUESTIONS 1-9: 0

## 2023-03-16 ASSESSMENT — LIFESTYLE VARIABLES
HOW OFTEN DO YOU HAVE A DRINK CONTAINING ALCOHOL: MONTHLY OR LESS
HOW MANY STANDARD DRINKS CONTAINING ALCOHOL DO YOU HAVE ON A TYPICAL DAY: 1 OR 2

## 2023-03-16 NOTE — PROGRESS NOTES
9.6 oz (78.7 kg)   Height: 5' 4.02\" (1.626 m)      Body mass index is 29.78 kg/m². General Appearance: alert and oriented to person, place and time, well developed and well- nourished, in no acute distress  Skin: warm and dry, no rash or erythema  Head: normocephalic and atraumatic  Eyes: pupils equal, round, and reactive to light, extraocular eye movements intact, conjunctivae normal  ENT: tympanic membrane, external ear and ear canal normal bilaterally, nose without deformity, nasal mucosa and turbinates normal without polyps  Neck: supple and non-tender without mass, no thyromegaly or thyroid nodules, no cervical lymphadenopathy  Pulmonary/Chest: clear to auscultation bilaterally- no wheezes, rales or rhonchi, normal air movement, no respiratory distress  Cardiovascular: normal rate, regular rhythm, normal S1 and S2, no murmurs, rubs, clicks, or gallops, distal pulses intact, no carotid bruits  Abdomen: soft, non-tender, non-distended, normal bowel sounds, no masses or organomegaly  Extremities: no cyanosis, clubbing or edema  Musculoskeletal: normal range of motion, no joint swelling, deformity or tenderness  Neurologic: reflexes normal and symmetric, no cranial nerve deficit, gait, coordination and speech normal       Allergies   Allergen Reactions    Codeine Other (See Comments)     Breathing difficulties     Prior to Visit Medications    Medication Sig Taking?  Authorizing Provider   aluminum & magnesium hydroxide-simethicone (MYLANTA) 200-200-20 MG/5ML SUSP suspension Take 30 mLs by mouth as needed Yes Historical Provider, MD   ondansetron (ZOFRAN) 4 MG tablet Take 4 mg by mouth every 4-6 hours as needed for Nausea Yes Historical Provider, MD   Docusate Sodium (COLACE PO) Take by mouth in the morning and at bedtime Yes Historical Provider, MD   metoprolol tartrate (LOPRESSOR) 25 MG tablet Take 0.5 tablets by mouth 2 times daily Yes Thuy Ndiaye PA-C   isosorbide mononitrate (IMDUR) 30 MG extended

## 2023-03-16 NOTE — TELEPHONE ENCOUNTER
Patient's appointment is today. I was not able to contact her within the time frame needed to inform her of the appointment and sending a letter within the short time I had would not have been appropriate. If the patient is not able to attend their appointment today then they will be rescheduled. However, this documentation is to provide evidence that it would not be appropriate to send a notice letter due to the short timeframe we had to notify the patient.

## 2023-03-28 ENCOUNTER — HOSPITAL ENCOUNTER (OUTPATIENT)
Dept: NON INVASIVE DIAGNOSTICS | Age: 78
Discharge: HOME | End: 2023-03-28
Payer: MEDICARE

## 2023-03-28 VITALS — WEIGHT: 170 LBS | HEIGHT: 64 IN | BODY MASS INDEX: 29.02 KG/M2

## 2023-03-28 DIAGNOSIS — I50.9 CHF (NYHA CLASS II, ACC/AHA STAGE C) (HCC): ICD-10-CM

## 2023-03-28 DIAGNOSIS — I25.10 CORONARY ARTERY DISEASE INVOLVING NATIVE CORONARY ARTERY OF NATIVE HEART WITHOUT ANGINA PECTORIS: ICD-10-CM

## 2023-03-28 PROCEDURE — 93017 CV STRESS TEST TRACING ONLY: CPT | Performed by: INTERNAL MEDICINE

## 2023-03-28 PROCEDURE — 6360000002 HC RX W HCPCS

## 2023-03-28 PROCEDURE — A9500 TC99M SESTAMIBI: HCPCS | Performed by: STUDENT IN AN ORGANIZED HEALTH CARE EDUCATION/TRAINING PROGRAM

## 2023-03-28 PROCEDURE — 3430000000 HC RX DIAGNOSTIC RADIOPHARMACEUTICAL: Performed by: STUDENT IN AN ORGANIZED HEALTH CARE EDUCATION/TRAINING PROGRAM

## 2023-03-28 PROCEDURE — 78452 HT MUSCLE IMAGE SPECT MULT: CPT | Performed by: INTERNAL MEDICINE

## 2023-03-28 RX ORDER — TETRAKIS(2-METHOXYISOBUTYLISOCYANIDE)COPPER(I) TETRAFLUOROBORATE 1 MG/ML
30.9 INJECTION, POWDER, LYOPHILIZED, FOR SOLUTION INTRAVENOUS
Status: COMPLETED | OUTPATIENT
Start: 2023-03-28 | End: 2023-03-28

## 2023-03-28 RX ORDER — TETRAKIS(2-METHOXYISOBUTYLISOCYANIDE)COPPER(I) TETRAFLUOROBORATE 1 MG/ML
9.7 INJECTION, POWDER, LYOPHILIZED, FOR SOLUTION INTRAVENOUS
Status: COMPLETED | OUTPATIENT
Start: 2023-03-28 | End: 2023-03-28

## 2023-03-28 RX ADMIN — Medication 9.7 MILLICURIE: at 09:35

## 2023-03-28 RX ADMIN — Medication 30.9 MILLICURIE: at 10:30

## 2023-03-30 ENCOUNTER — TELEPHONE (OUTPATIENT)
Dept: CARDIOLOGY CLINIC | Age: 78
End: 2023-03-30

## 2023-03-30 NOTE — TELEPHONE ENCOUNTER
Spoke with pt daughter she said she texted her mom, but she will stop and talk to her in a little bit.

## 2023-03-30 NOTE — TELEPHONE ENCOUNTER
Result note for Stress test [place] [facility])    ----- Message from Lisa Chand PA-C sent at 3/30/2023  7:34 AM EDT -----  Stress looks good. Consider earlier follow up with rupesh if still having symptoms.

## 2023-03-30 NOTE — TELEPHONE ENCOUNTER
Attempted to call patient no answer no VM, spoke to patient daughter Janusz Pickett on hippa gave message and patient will call back if having symptoms to schedule sooner appt with Dr. Sajan Calle.

## 2023-06-02 DIAGNOSIS — I50.9 CHF (NYHA CLASS II, ACC/AHA STAGE C) (HCC): ICD-10-CM

## 2023-06-02 DIAGNOSIS — E78.00 HYPERCHOLESTEROLEMIA: ICD-10-CM

## 2023-06-02 DIAGNOSIS — K21.9 GASTROESOPHAGEAL REFLUX DISEASE, UNSPECIFIED WHETHER ESOPHAGITIS PRESENT: ICD-10-CM

## 2023-06-02 RX ORDER — POTASSIUM CHLORIDE 750 MG/1
TABLET, FILM COATED, EXTENDED RELEASE ORAL
Qty: 180 TABLET | Refills: 0 | Status: SHIPPED | OUTPATIENT
Start: 2023-06-02

## 2023-06-02 RX ORDER — SPIRONOLACTONE 25 MG/1
25 TABLET ORAL EVERY 12 HOURS
Qty: 180 TABLET | Refills: 1 | Status: SHIPPED | OUTPATIENT
Start: 2023-06-02

## 2023-06-02 RX ORDER — SIMVASTATIN 20 MG
20 TABLET ORAL NIGHTLY
Qty: 90 TABLET | Refills: 0 | Status: SHIPPED | OUTPATIENT
Start: 2023-06-02

## 2023-06-02 RX ORDER — FUROSEMIDE 40 MG/1
40 TABLET ORAL 2 TIMES DAILY
Qty: 180 TABLET | Refills: 0 | Status: SHIPPED | OUTPATIENT
Start: 2023-06-02

## 2023-06-02 RX ORDER — CLOPIDOGREL BISULFATE 75 MG/1
75 TABLET ORAL DAILY
Qty: 90 TABLET | Refills: 0 | Status: SHIPPED | OUTPATIENT
Start: 2023-06-02

## 2023-06-02 RX ORDER — ISOSORBIDE MONONITRATE 30 MG/1
30 TABLET, EXTENDED RELEASE ORAL 2 TIMES DAILY
Qty: 90 TABLET | Refills: 0 | Status: SHIPPED | OUTPATIENT
Start: 2023-06-02

## 2023-06-02 RX ORDER — NITROGLYCERIN 0.4 MG/1
0.4 TABLET SUBLINGUAL EVERY 5 MIN PRN
Qty: 25 TABLET | Refills: 0 | Status: SHIPPED | OUTPATIENT
Start: 2023-06-02

## 2023-06-02 RX ORDER — PANTOPRAZOLE SODIUM 40 MG/1
40 TABLET, DELAYED RELEASE ORAL DAILY
Qty: 90 TABLET | Refills: 0 | Status: SHIPPED | OUTPATIENT
Start: 2023-06-02 | End: 2024-06-01

## 2023-07-06 ENCOUNTER — TELEPHONE (OUTPATIENT)
Dept: CARDIOLOGY CLINIC | Age: 78
End: 2023-07-06

## 2023-07-06 NOTE — TELEPHONE ENCOUNTER
Pt LM on VM when is next appt and are labs due. Attempted to call patient back pt VM is not set up not able to LM. Will call pt back.

## 2023-07-07 ENCOUNTER — HOSPITAL ENCOUNTER (OUTPATIENT)
Age: 78
Discharge: HOME | End: 2023-07-07
Payer: MEDICARE

## 2023-07-07 DIAGNOSIS — I50.32 CHF NYHA CLASS II, CHRONIC, DIASTOLIC (HCC): ICD-10-CM

## 2023-07-07 DIAGNOSIS — E78.00 HYPERCHOLESTEROLEMIA: ICD-10-CM

## 2023-07-07 LAB
ANION GAP SERPL CALC-SCNC: 13 MEQ/L (ref 8–16)
BASOPHILS ABSOLUTE: 0 THOU/MM3 (ref 0–0.1)
BASOPHILS NFR BLD AUTO: 0.4 %
BUN SERPL-MCNC: 18 MG/DL (ref 7–22)
CALCIUM SERPL-MCNC: 9.7 MG/DL (ref 8.5–10.5)
CHLORIDE SERPL-SCNC: 99 MEQ/L (ref 98–111)
CO2 SERPL-SCNC: 28 MEQ/L (ref 23–33)
CREAT SERPL-MCNC: 1.2 MG/DL (ref 0.4–1.2)
DEPRECATED RDW RBC AUTO: 50.7 FL (ref 35–45)
EOSINOPHIL NFR BLD AUTO: 1 %
EOSINOPHILS ABSOLUTE: 0.1 THOU/MM3 (ref 0–0.4)
ERYTHROCYTE [DISTWIDTH] IN BLOOD BY AUTOMATED COUNT: 13.3 % (ref 11.5–14.5)
GFR SERPL CREATININE-BSD FRML MDRD: 47 ML/MIN/1.73M2
GLUCOSE SERPL-MCNC: 103 MG/DL (ref 70–108)
HCT VFR BLD AUTO: 46.8 % (ref 37–47)
HGB BLD-MCNC: 15.1 GM/DL (ref 12–16)
IMM GRANULOCYTES # BLD AUTO: 0.01 THOU/MM3 (ref 0–0.07)
IMM GRANULOCYTES NFR BLD AUTO: 0.1 %
LYMPHOCYTES ABSOLUTE: 3 THOU/MM3 (ref 1–4.8)
LYMPHOCYTES NFR BLD AUTO: 42.6 %
MCH RBC QN AUTO: 33 PG (ref 26–33)
MCHC RBC AUTO-ENTMCNC: 32.3 GM/DL (ref 32.2–35.5)
MCV RBC AUTO: 102.2 FL (ref 81–99)
MONOCYTES ABSOLUTE: 0.7 THOU/MM3 (ref 0.4–1.3)
MONOCYTES NFR BLD AUTO: 9.8 %
NEUTROPHILS NFR BLD AUTO: 46.1 %
NRBC BLD AUTO-RTO: 0 /100 WBC
PLATELET # BLD AUTO: 219 THOU/MM3 (ref 130–400)
PMV BLD AUTO: 9.6 FL (ref 9.4–12.4)
POTASSIUM SERPL-SCNC: 5.1 MEQ/L (ref 3.5–5.2)
RBC # BLD AUTO: 4.58 MILL/MM3 (ref 4.2–5.4)
SEGMENTED NEUTROPHILS ABSOLUTE COUNT: 3.2 THOU/MM3 (ref 1.8–7.7)
SODIUM SERPL-SCNC: 140 MEQ/L (ref 135–145)
WBC # BLD AUTO: 7 THOU/MM3 (ref 4.8–10.8)

## 2023-07-07 PROCEDURE — 80048 BASIC METABOLIC PNL TOTAL CA: CPT

## 2023-07-07 PROCEDURE — 36415 COLL VENOUS BLD VENIPUNCTURE: CPT

## 2023-07-07 PROCEDURE — 85025 COMPLETE CBC W/AUTO DIFF WBC: CPT

## 2023-07-10 ENCOUNTER — TELEPHONE (OUTPATIENT)
Dept: CARDIOLOGY CLINIC | Age: 78
End: 2023-07-10

## 2023-07-27 ENCOUNTER — OFFICE VISIT (OUTPATIENT)
Dept: CARDIOLOGY CLINIC | Age: 78
End: 2023-07-27
Payer: MEDICARE

## 2023-07-27 VITALS
HEART RATE: 90 BPM | BODY MASS INDEX: 29.33 KG/M2 | DIASTOLIC BLOOD PRESSURE: 68 MMHG | WEIGHT: 171.8 LBS | HEIGHT: 64 IN | SYSTOLIC BLOOD PRESSURE: 115 MMHG

## 2023-07-27 DIAGNOSIS — I50.32 CHRONIC DIASTOLIC CHF (CONGESTIVE HEART FAILURE) (HCC): ICD-10-CM

## 2023-07-27 DIAGNOSIS — I25.10 CORONARY ARTERY DISEASE INVOLVING NATIVE CORONARY ARTERY OF NATIVE HEART WITHOUT ANGINA PECTORIS: Primary | ICD-10-CM

## 2023-07-27 PROCEDURE — G8417 CALC BMI ABV UP PARAM F/U: HCPCS | Performed by: INTERNAL MEDICINE

## 2023-07-27 PROCEDURE — 1123F ACP DISCUSS/DSCN MKR DOCD: CPT | Performed by: INTERNAL MEDICINE

## 2023-07-27 PROCEDURE — 99214 OFFICE O/P EST MOD 30 MIN: CPT | Performed by: INTERNAL MEDICINE

## 2023-07-27 PROCEDURE — 3078F DIAST BP <80 MM HG: CPT | Performed by: INTERNAL MEDICINE

## 2023-07-27 PROCEDURE — G8400 PT W/DXA NO RESULTS DOC: HCPCS | Performed by: INTERNAL MEDICINE

## 2023-07-27 PROCEDURE — 1036F TOBACCO NON-USER: CPT | Performed by: INTERNAL MEDICINE

## 2023-07-27 PROCEDURE — 3074F SYST BP LT 130 MM HG: CPT | Performed by: INTERNAL MEDICINE

## 2023-07-27 PROCEDURE — 1090F PRES/ABSN URINE INCON ASSESS: CPT | Performed by: INTERNAL MEDICINE

## 2023-07-27 PROCEDURE — G8427 DOCREV CUR MEDS BY ELIG CLIN: HCPCS | Performed by: INTERNAL MEDICINE

## 2023-07-27 RX ORDER — ASPIRIN 81 MG/1
81 TABLET ORAL DAILY
COMMUNITY

## 2023-07-27 NOTE — PROGRESS NOTES
1 year follow-up. She states ankles are darkened in color, she denies having any claudication. She had knee surgery on 2/3 and she thinks since then she has gone down hill.

## 2023-07-27 NOTE — PROGRESS NOTES
[address] [place]  Dept: [phone]  Dept Fax: [phone]  Loc: [phone]    Visit Date: 7/27/2023    Ms. Stacy Lawrence is a 69 y.o. female  who presented for:  Chief Complaint   Patient presents with    1 Year Follow Up    Congestive Heart Failure       HPI:   HPI   70 yo F hx of PDA stenosis s/p PCI x 2 QUINCY, preserved EF, grade 1 DD who presents for follow-up. Significant UE bleeding and ecchymoses, spontaneous. She may have chest pain with severe exertion but otherwise, she is fine, this is not persistent. Taking all meds. She is on SAPT with Plavix. BP stable.         Current Outpatient Medications:     spironolactone (ALDACTONE) 25 MG tablet, Take 1 tablet by mouth in the morning and 1 tablet in the evening., Disp: 180 tablet, Rfl: 1    simvastatin (ZOCOR) 20 MG tablet, Take 1 tablet by mouth nightly, Disp: 90 tablet, Rfl: 0    potassium chloride (KLOR-CON) 10 MEQ extended release tablet, Take 1 tablet by mouth twice daily, Disp: 180 tablet, Rfl: 0    pantoprazole (PROTONIX) 40 MG tablet, Take 1 tablet by mouth daily 30 minutes prior to meal, Disp: 90 tablet, Rfl: 0    nitroGLYCERIN (NITROSTAT) 0.4 MG SL tablet, Place 1 tablet under the tongue every 5 minutes as needed for Chest pain (Maximum 3 doses), Disp: 25 tablet, Rfl: 0    metoprolol tartrate (LOPRESSOR) 25 MG tablet, Take 0.5 tablets by mouth 2 times daily, Disp: 45 tablet, Rfl: 0    isosorbide mononitrate (IMDUR) 30 MG extended release tablet, Take 1 tablet by mouth in the morning and at bedtime, Disp: 90 tablet, Rfl: 0    furosemide (LASIX) 40 MG tablet, Take 1 tablet by mouth 2 times daily AND as needed for wt gain, swelling, Disp: 180 tablet, Rfl: 0    clopidogrel (PLAVIX) 75 MG tablet, Take 1 tablet by mouth daily, Disp: 90 tablet, Rfl: 0    aluminum & magnesium hydroxide-simethicone (MAALOX) 200-200-20 MG/5ML SUSP suspension, Take 30 mLs

## 2023-08-19 DIAGNOSIS — E78.00 HYPERCHOLESTEROLEMIA: ICD-10-CM

## 2023-08-19 DIAGNOSIS — I50.9 CHF (NYHA CLASS II, ACC/AHA STAGE C) (HCC): ICD-10-CM

## 2023-08-21 RX ORDER — SPIRONOLACTONE 25 MG/1
25 TABLET ORAL EVERY 12 HOURS
Qty: 180 TABLET | Refills: 3 | Status: SHIPPED | OUTPATIENT
Start: 2023-08-21

## 2023-08-21 RX ORDER — SIMVASTATIN 20 MG
20 TABLET ORAL NIGHTLY
Qty: 90 TABLET | Refills: 3 | Status: SHIPPED | OUTPATIENT
Start: 2023-08-21

## 2023-08-22 DIAGNOSIS — K21.9 GASTROESOPHAGEAL REFLUX DISEASE, UNSPECIFIED WHETHER ESOPHAGITIS PRESENT: ICD-10-CM

## 2023-08-22 RX ORDER — PANTOPRAZOLE SODIUM 40 MG/1
TABLET, DELAYED RELEASE ORAL
Qty: 90 TABLET | Refills: 3 | Status: SHIPPED | OUTPATIENT
Start: 2023-08-22

## 2023-08-22 RX ORDER — ISOSORBIDE MONONITRATE 30 MG/1
30 TABLET, EXTENDED RELEASE ORAL 2 TIMES DAILY
Qty: 90 TABLET | Refills: 3 | Status: SHIPPED | OUTPATIENT
Start: 2023-08-22

## 2023-08-22 RX ORDER — FUROSEMIDE 40 MG/1
TABLET ORAL
Qty: 180 TABLET | Refills: 3 | Status: SHIPPED | OUTPATIENT
Start: 2023-08-22

## 2023-10-16 RX ORDER — POTASSIUM CHLORIDE 750 MG/1
TABLET, FILM COATED, EXTENDED RELEASE ORAL
Qty: 180 TABLET | Refills: 3 | Status: SHIPPED | OUTPATIENT
Start: 2023-10-16

## 2023-12-26 RX ORDER — POTASSIUM CHLORIDE 750 MG/1
TABLET, FILM COATED, EXTENDED RELEASE ORAL
Qty: 180 TABLET | Refills: 2 | Status: SHIPPED | OUTPATIENT
Start: 2023-12-26

## 2024-01-02 DIAGNOSIS — K21.9 GASTROESOPHAGEAL REFLUX DISEASE, UNSPECIFIED WHETHER ESOPHAGITIS PRESENT: ICD-10-CM

## 2024-01-02 RX ORDER — PANTOPRAZOLE SODIUM 40 MG/1
TABLET, DELAYED RELEASE ORAL
Qty: 90 TABLET | Refills: 2 | Status: SHIPPED | OUTPATIENT
Start: 2024-01-02

## 2024-01-02 RX ORDER — FUROSEMIDE 40 MG/1
TABLET ORAL
Qty: 180 TABLET | Refills: 2 | Status: SHIPPED | OUTPATIENT
Start: 2024-01-02

## 2024-01-02 RX ORDER — ISOSORBIDE MONONITRATE 30 MG/1
30 TABLET, EXTENDED RELEASE ORAL 2 TIMES DAILY
Qty: 180 TABLET | Refills: 2 | Status: SHIPPED | OUTPATIENT
Start: 2024-01-02

## 2024-01-09 ENCOUNTER — OFFICE VISIT (OUTPATIENT)
Dept: CARDIOLOGY CLINIC | Age: 79
End: 2024-01-09
Payer: MEDICARE

## 2024-01-09 ENCOUNTER — HOSPITAL ENCOUNTER (OUTPATIENT)
Age: 79
Discharge: HOME | End: 2024-01-09
Payer: MEDICARE

## 2024-01-09 VITALS
HEIGHT: 64 IN | SYSTOLIC BLOOD PRESSURE: 104 MMHG | DIASTOLIC BLOOD PRESSURE: 60 MMHG | OXYGEN SATURATION: 96 % | WEIGHT: 175 LBS | HEART RATE: 86 BPM | BODY MASS INDEX: 29.88 KG/M2

## 2024-01-09 DIAGNOSIS — I10 ESSENTIAL HYPERTENSION: ICD-10-CM

## 2024-01-09 DIAGNOSIS — I25.10 CORONARY ARTERY DISEASE INVOLVING NATIVE CORONARY ARTERY OF NATIVE HEART WITHOUT ANGINA PECTORIS: ICD-10-CM

## 2024-01-09 DIAGNOSIS — R60.0 LOWER EXTREMITY EDEMA: ICD-10-CM

## 2024-01-09 DIAGNOSIS — I50.9 CHF (NYHA CLASS II, ACC/AHA STAGE C) (HCC): ICD-10-CM

## 2024-01-09 DIAGNOSIS — I50.9 CHF (NYHA CLASS II, ACC/AHA STAGE C) (HCC): Primary | ICD-10-CM

## 2024-01-09 LAB
ANION GAP SERPL CALC-SCNC: 14 MEQ/L (ref 8–16)
BUN SERPL-MCNC: 27 MG/DL (ref 7–22)
CALCIUM SERPL-MCNC: 9.8 MG/DL (ref 8.5–10.5)
CHLORIDE SERPL-SCNC: 98 MEQ/L (ref 98–111)
CO2 SERPL-SCNC: 28 MEQ/L (ref 23–33)
CREAT SERPL-MCNC: 1.4 MG/DL (ref 0.4–1.2)
GFR SERPL CREATININE-BSD FRML MDRD: 39 ML/MIN/1.73M2
GLUCOSE SERPL-MCNC: 116 MG/DL (ref 70–108)
POTASSIUM SERPL-SCNC: 4.3 MEQ/L (ref 3.5–5.2)
SODIUM SERPL-SCNC: 140 MEQ/L (ref 135–145)

## 2024-01-09 PROCEDURE — 36415 COLL VENOUS BLD VENIPUNCTURE: CPT

## 2024-01-09 PROCEDURE — 99214 OFFICE O/P EST MOD 30 MIN: CPT | Performed by: NURSE PRACTITIONER

## 2024-01-09 PROCEDURE — 3074F SYST BP LT 130 MM HG: CPT | Performed by: NURSE PRACTITIONER

## 2024-01-09 PROCEDURE — 80048 BASIC METABOLIC PNL TOTAL CA: CPT

## 2024-01-09 PROCEDURE — 1123F ACP DISCUSS/DSCN MKR DOCD: CPT | Performed by: NURSE PRACTITIONER

## 2024-01-09 PROCEDURE — 3078F DIAST BP <80 MM HG: CPT | Performed by: NURSE PRACTITIONER

## 2024-01-09 ASSESSMENT — ENCOUNTER SYMPTOMS
COUGH: 0
ABDOMINAL DISTENTION: 0
SHORTNESS OF BREATH: 0

## 2024-01-09 NOTE — PATIENT INSTRUCTIONS
You may receive a survey regarding the care you received during your visit.  Your input is valuable to us.  We encourage you to complete and return your survey.  We hope you will choose us in the future for your healthcare needs.    Your nurses today were Charles.  Office hours:   Mon-Thurs 8-4:30  Friday 8-12  Phone: [phone]    Continue:  Continue current medications  Daily weights and record  Fluid restriction of 2 Liters per day  Limit sodium in diet to around 0453-0616 mg/day  Monitor BP  Activity as tolerated     Call the Heart Failure Clinic for any of the following symptoms:   Weight gain of 2-3 pounds in 1 day or 5 pounds in 1 week  Increased shortness of breath  Shortness of breath while laying down  Cough  Chest pain  Swelling in feet, ankles or legs  Bloating in abdomen  Fatigue

## 2024-01-09 NOTE — PROGRESS NOTES
Heart Failure Clinic       Visit Date: 1/9/2024  Cardiologist:  Dr. Valencia  Primary Care Physician: Jennifer Hernandez MD    Keyanna Tejada is a 78 y.o. female who presents today for:  Chief Complaint   Patient presents with    Congestive Heart Failure       HPI:   Keyanna Tejada is a 78 y.o. female who presents to the office for a follow up patient visit in the heart failure clinic.  Accompanied by family    TYPE HF: Diastolic (EF 50-55%)  Device: no  HX: HTN, HLD,CAD (PCI, 1/2022), Renal stone (Berkowitz), significant family hx Cancers  ??GENNARO had sleep study in past - not tolerate mask     Dry Wt: 175-180     Hospitalization:  None  New pt to [facility] in June  LHC/RHC 8/2020 - elevated pressures/volume overload - started on Lasix 40 BID   Referred to CHF clinic  OV 9/14/20 - added Aldactone  OV [facility] 1/14/21 = no changes    1/2022 - worsening CP, HORTON - ECHO done - no changes  L/RHC - PCI to PDA.  RH normal pressures.   Referred to Pulmonary - saw Pulmonary started on Advair.  Referred to Neuro for memory issues - seeing in Sept 7/2022:  184# - doing ok.  Pretty active. Some memory issues    1/2023 - 181#  Doing pretty well   Having knee surgery next month  No fluid on exam.  No concerns voiced.  Urine output good  Walked from entrance    TODAY 1/2024 - 175#  Doing well   Still some swelling at times.  Elevates and usually improves  Staying active - walks at [employer] and [employer] often  Occasional CP - resolves w/ rest  No fluid on exam.    Bp on low side - no dizziness.       Past Medical History:   Diagnosis Date    CAD (coronary artery disease)     PCI     Chronic kidney disease     stones    Hyperlipidemia     Hypertension      Past Surgical History:   Procedure Laterality Date    APPENDECTOMY      CAROTID STENT PLACEMENT      CHOLECYSTECTOMY      COLONOSCOPY      CYSTO/URETERO/PYELOSCOPY, CALCULUS TX N/A 05/08/2019    CYSTO, LEFT URETEROSCOPY, URETERORENOSCOPY, LASER LITOHTRIPSY, BASKET RETRIEVAL OF

## 2024-01-10 ENCOUNTER — TELEPHONE (OUTPATIENT)
Dept: CARDIOLOGY CLINIC | Age: 79
End: 2024-01-10

## 2024-01-10 DIAGNOSIS — I50.9 CHF (NYHA CLASS II, ACC/AHA STAGE C) (HCC): Primary | ICD-10-CM

## 2024-01-10 RX ORDER — FUROSEMIDE 40 MG/1
TABLET ORAL
Qty: 135 TABLET | Refills: 3 | Status: SHIPPED | OUTPATIENT
Start: 2024-01-10

## 2024-01-10 NOTE — TELEPHONE ENCOUNTER
Getting a little dry.  Kidney function declined  Decrease evening Lasix to HALF tab.  Repeat BMP in 2 mths.

## 2024-03-27 ENCOUNTER — OFFICE VISIT (OUTPATIENT)
Dept: INTERNAL MEDICINE CLINIC | Age: 79
End: 2024-03-27
Payer: MEDICARE

## 2024-03-27 VITALS
BODY MASS INDEX: 28.99 KG/M2 | SYSTOLIC BLOOD PRESSURE: 128 MMHG | DIASTOLIC BLOOD PRESSURE: 76 MMHG | HEART RATE: 94 BPM | HEIGHT: 64 IN | WEIGHT: 169.8 LBS

## 2024-03-27 DIAGNOSIS — I50.22 CHRONIC SYSTOLIC (CONGESTIVE) HEART FAILURE (HCC): Primary | ICD-10-CM

## 2024-03-27 DIAGNOSIS — E78.00 HYPERCHOLESTEROLEMIA: ICD-10-CM

## 2024-03-27 PROCEDURE — 1123F ACP DISCUSS/DSCN MKR DOCD: CPT

## 2024-03-27 PROCEDURE — 99213 OFFICE O/P EST LOW 20 MIN: CPT

## 2024-03-27 PROCEDURE — 3078F DIAST BP <80 MM HG: CPT

## 2024-03-27 PROCEDURE — 3074F SYST BP LT 130 MM HG: CPT

## 2024-03-27 RX ORDER — SIMVASTATIN 20 MG
20 TABLET ORAL NIGHTLY
Qty: 90 TABLET | Refills: 3 | Status: SHIPPED | OUTPATIENT
Start: 2024-03-27

## 2024-03-27 SDOH — ECONOMIC STABILITY: FOOD INSECURITY: WITHIN THE PAST 12 MONTHS, YOU WORRIED THAT YOUR FOOD WOULD RUN OUT BEFORE YOU GOT MONEY TO BUY MORE.: NEVER TRUE

## 2024-03-27 SDOH — ECONOMIC STABILITY: FOOD INSECURITY: WITHIN THE PAST 12 MONTHS, THE FOOD YOU BOUGHT JUST DIDN'T LAST AND YOU DIDN'T HAVE MONEY TO GET MORE.: NEVER TRUE

## 2024-03-27 SDOH — ECONOMIC STABILITY: INCOME INSECURITY: HOW HARD IS IT FOR YOU TO PAY FOR THE VERY BASICS LIKE FOOD, HOUSING, MEDICAL CARE, AND HEATING?: NOT HARD AT ALL

## 2024-03-27 ASSESSMENT — PATIENT HEALTH QUESTIONNAIRE - PHQ9
SUM OF ALL RESPONSES TO PHQ QUESTIONS 1-9: 0
2. FEELING DOWN, DEPRESSED OR HOPELESS: NOT AT ALL
SUM OF ALL RESPONSES TO PHQ9 QUESTIONS 1 & 2: 0
1. LITTLE INTEREST OR PLEASURE IN DOING THINGS: NOT AT ALL
SUM OF ALL RESPONSES TO PHQ QUESTIONS 1-9: 0

## 2024-03-27 NOTE — PROGRESS NOTES
Internal Medicine  Resident Clinic Progress Note    Patient:  Keyanna Tejada, 78 y.o. female  : 1945  MRN:  [id number]     Acct:      PCP:  Jennifer Mathis MD    Date of Service:  24      ASSESSMENT / PLAN:  HFpEF. Seen by Dr. Valencia, and CHF clinic. No acute change. She weighs herself daily, and takes lasix 40-20 mg in am and pm. She does not take SGLT2i. Will see if her insurance would cover it. She takes lopressor 12.5 mg bid, spironolactone 25 mg, simvastatin 20 mg..  CKD Stage 3. Still takes lasix. Last BUN and Cr slightly elevated 2 months ago. Will follow with labs for CHF clinic. Will consider nephrology consultation at later visit if significant decline.  CAD, s/p PCI PDAx2. Follows with Dr. Valencia. Asprin 25.    Reason for Visit:  Follow- up, routine.    Subjective:  Patient is here for yearly follow-up. She is endorsing no acute problems. She has some minor shortness of breath, but is still able to walk around stores and engage in her daily activities as normal. She is accompanied by her daughter. She has been using pill containers to organize her medications. She has had some difficulty with memory recently. She takes lasix 40, 20 mg in morning and evening, is seen by CHF clinic and Dr. Valencia cardiologist office. Her next appointment with them is in July of this year. She needs a refill of her simvastatin.    Past Medical History:  Past Medical History:   Diagnosis Date    (HFpEF) heart failure with preserved ejection fraction (HCC)     CAD (coronary artery disease)     PCI     Chronic kidney disease     stones    Hyperlipidemia     Hypertension      Past Surgical History:  Past Surgical History:   Procedure Laterality Date    APPENDECTOMY      CAROTID STENT PLACEMENT      CHOLECYSTECTOMY      COLONOSCOPY      CYSTO/URETERO/PYELOSCOPY, CALCULUS TX N/A 2019    CYSTO, LEFT URETEROSCOPY, URETERORENOSCOPY, LASER LITOHTRIPSY, BASKET RETRIEVAL OF STONE FRAGMENTS, LEFT URETERAL STENT

## 2024-04-01 ENCOUNTER — TELEPHONE (OUTPATIENT)
Dept: INTERNAL MEDICINE CLINIC | Age: 79
End: 2024-04-01

## 2024-04-01 NOTE — TELEPHONE ENCOUNTER
----- Message from Mallorie Romeo sent at 3/29/2024 11:32 AM EDT -----  Regarding: RE: re: SGLT2  Ana Luisa Jiménez and Farxiga are each $47.00/month. Thank you! :-)  Mallorie Romeo, [facility] - Prescription Assistance ([phone]) 3/29/2024,11:32 AM    ----- Message -----  From: Tino Garcia MA  Sent: 3/27/2024   3:59 PM EDT  To: Mallorie Romeo  Subject: re: SGLT2                                        Good Afternoon Mallorie!   Could you check on copy for SGLT@ for Mrs. Tejada for us.  Much appreciated!  Thank you HEIDI Jiménez

## 2024-04-18 ENCOUNTER — PATIENT MESSAGE (OUTPATIENT)
Dept: CARDIOLOGY CLINIC | Age: 79
End: 2024-04-18

## 2024-04-18 ENCOUNTER — TELEPHONE (OUTPATIENT)
Dept: CARDIOLOGY CLINIC | Age: 79
End: 2024-04-18

## 2024-04-18 ENCOUNTER — HOSPITAL ENCOUNTER (OUTPATIENT)
Age: 79
Discharge: HOME | End: 2024-04-18
Payer: MEDICARE

## 2024-04-18 DIAGNOSIS — I50.9 CHF (NYHA CLASS II, ACC/AHA STAGE C) (HCC): ICD-10-CM

## 2024-04-18 LAB
ANION GAP SERPL CALC-SCNC: 11 MEQ/L (ref 8–16)
BUN SERPL-MCNC: 27 MG/DL (ref 7–22)
CALCIUM SERPL-MCNC: 9.4 MG/DL (ref 8.5–10.5)
CHLORIDE SERPL-SCNC: 97 MEQ/L (ref 98–111)
CO2 SERPL-SCNC: 28 MEQ/L (ref 23–33)
CREAT SERPL-MCNC: 1.2 MG/DL (ref 0.4–1.2)
GFR SERPL CREATININE-BSD FRML MDRD: 47 ML/MIN/1.73M2
GLUCOSE SERPL-MCNC: 98 MG/DL (ref 70–108)
POTASSIUM SERPL-SCNC: 4.9 MEQ/L (ref 3.5–5.2)
SODIUM SERPL-SCNC: 136 MEQ/L (ref 135–145)

## 2024-04-18 PROCEDURE — 80048 BASIC METABOLIC PNL TOTAL CA: CPT

## 2024-04-18 PROCEDURE — 36415 COLL VENOUS BLD VENIPUNCTURE: CPT

## 2024-04-18 NOTE — TELEPHONE ENCOUNTER
Ana Short, APRN - CNP   to Keyanna Tejada         4/18/24  2:12 PM  Hi Keyanna     Your labs look good.  Continue meds the same.  Call or message if any questions or concerns.     Thanks  Ana       Last read by Keyanna Tejada at  3:35 PM on 4/18/2024.  Keyanna Tejada   to [facility] Clinical Staff (supporting Ana Short, NASIM - KATY)         4/18/24  3:38 PM  This is her daughter Edel. I discovered today I believe she’s continuing to take a whole lasix twice a day not a whole then a half in the evening . She had been doing great where meds but only if it’s the same as what the bottle says. I am going to try and monitor her closer again. I was unaware she had any changes.         Ana advise?

## 2024-04-19 RX ORDER — POTASSIUM CHLORIDE 750 MG/1
10 TABLET, FILM COATED, EXTENDED RELEASE ORAL DAILY
Qty: 90 TABLET | Refills: 3 | Status: SHIPPED | OUTPATIENT
Start: 2024-04-19

## 2024-05-08 DIAGNOSIS — I50.9 CHF (NYHA CLASS II, ACC/AHA STAGE C) (HCC): ICD-10-CM

## 2024-05-08 DIAGNOSIS — K21.9 GASTROESOPHAGEAL REFLUX DISEASE, UNSPECIFIED WHETHER ESOPHAGITIS PRESENT: ICD-10-CM

## 2024-05-11 RX ORDER — PANTOPRAZOLE SODIUM 40 MG/1
TABLET, DELAYED RELEASE ORAL
Qty: 90 TABLET | Refills: 3 | Status: SHIPPED | OUTPATIENT
Start: 2024-05-11

## 2024-05-11 RX ORDER — POTASSIUM CHLORIDE 750 MG/1
10 TABLET, FILM COATED, EXTENDED RELEASE ORAL DAILY
Qty: 90 TABLET | Refills: 3 | Status: SHIPPED | OUTPATIENT
Start: 2024-05-11

## 2024-05-11 RX ORDER — SPIRONOLACTONE 25 MG/1
25 TABLET ORAL EVERY 12 HOURS
Qty: 180 TABLET | Refills: 3 | Status: SHIPPED | OUTPATIENT
Start: 2024-05-11

## 2024-05-11 RX ORDER — ISOSORBIDE MONONITRATE 30 MG/1
30 TABLET, EXTENDED RELEASE ORAL 2 TIMES DAILY
Qty: 180 TABLET | Refills: 3 | Status: SHIPPED | OUTPATIENT
Start: 2024-05-11

## 2024-05-11 RX ORDER — FUROSEMIDE 40 MG/1
TABLET ORAL
Qty: 135 TABLET | Refills: 3 | Status: SHIPPED | OUTPATIENT
Start: 2024-05-11

## 2024-07-25 ENCOUNTER — OFFICE VISIT (OUTPATIENT)
Dept: CARDIOLOGY CLINIC | Age: 79
End: 2024-07-25
Payer: MEDICARE

## 2024-07-25 VITALS
HEART RATE: 75 BPM | SYSTOLIC BLOOD PRESSURE: 120 MMHG | BODY MASS INDEX: 31.5 KG/M2 | WEIGHT: 184.5 LBS | DIASTOLIC BLOOD PRESSURE: 68 MMHG | HEIGHT: 64 IN

## 2024-07-25 DIAGNOSIS — E78.5 HYPERLIPIDEMIA, UNSPECIFIED HYPERLIPIDEMIA TYPE: ICD-10-CM

## 2024-07-25 DIAGNOSIS — I50.9 CHF (NYHA CLASS II, ACC/AHA STAGE C) (HCC): Primary | ICD-10-CM

## 2024-07-25 DIAGNOSIS — I10 ESSENTIAL HYPERTENSION: ICD-10-CM

## 2024-07-25 DIAGNOSIS — I25.10 CORONARY ARTERY DISEASE INVOLVING NATIVE CORONARY ARTERY OF NATIVE HEART WITHOUT ANGINA PECTORIS: ICD-10-CM

## 2024-07-25 PROCEDURE — 1123F ACP DISCUSS/DSCN MKR DOCD: CPT | Performed by: NURSE PRACTITIONER

## 2024-07-25 PROCEDURE — 3074F SYST BP LT 130 MM HG: CPT | Performed by: NURSE PRACTITIONER

## 2024-07-25 PROCEDURE — 3078F DIAST BP <80 MM HG: CPT | Performed by: NURSE PRACTITIONER

## 2024-07-25 PROCEDURE — 99214 OFFICE O/P EST MOD 30 MIN: CPT | Performed by: NURSE PRACTITIONER

## 2024-07-25 PROCEDURE — 93000 ELECTROCARDIOGRAM COMPLETE: CPT | Performed by: NURSE PRACTITIONER

## 2024-07-25 NOTE — PROGRESS NOTES
[facility] [place] [facility]  [facility] CARDIOLOGY  [address]  [address]  [facility] [address]  Dept: [phone]  Dept Fax: [phone]  Loc: [phone]    Visit Date: 7/25/2024    Ms. Tejada is a 78 y.o. female  who presented for: 1 year follow-up    Primary Cardiologist: Price Valencia MD    Chief Complaint   Patient presents with    Check-Up    Congestive Heart Failure    Coronary Artery Disease       HPI:   HPI   Last seen in office on 7/27/2023 per Dr. Valencia. Per office note:  78 yo F hx of PDA stenosis s/p PCI x 2 QUINCY, preserved EF, grade 1 DD who presents for follow-up.  Significant UE bleeding and ecchymoses, spontaneous.  She may have chest pain with severe exertion but otherwise, she is fine, this is not persistent.  Taking all meds.  She is on SAPT with Plavix.  BP stable.    Assessment/Plan   UE ecchymoses  Chronic LE edema  PDA stenosis s/p PCI x 2 QUINCY, 1/2022  HTN  Preserved EF  D/c Plavix, Rx ASA 81 mg q day.  BP stable.  No recurrent chest pain or pressures.  She is on Lasix BID for swelling/diastolic CHF.  She is walking and doing well with that.  LDL goal is < 70.  Discussed symptoms needing emergency care or those which require further medical attention.   Discussed diet/exercise/BP/weight loss/health lifestyle choices/lipids; the patient understands the goals and will try to comply.   Disposition: 1 year    Today's visit:   Subjective:  Denies chest discomfort or dyspnea. Breathing stable. No lightheadedness or dizziness; no syncope or near syncope. No heart racing or palpitations. No swelling or water retention issues. Tolerating medications. No bleeding on ASA. ADL without issue.         Current Outpatient Medications:     furosemide (LASIX) 40 MG tablet, Take 1 tablet by mouth every morning AND 0.5 tablets every evening., Disp: 135 tablet, Rfl: 3    isosorbide mononitrate (IMDUR) 30 MG extended release tablet, Take 1 tablet by mouth in the morning and at

## 2024-07-25 NOTE — PATIENT INSTRUCTIONS
Continue current medications as prescribed.    Stay as active as you can.     Eat heart healthy diet.     Follow-up with your PCP as scheduled.    Follow-up with Dr. Valencia or Betty BURNS in 1 year as scheduled or sooner if need.

## 2024-07-25 NOTE — PROGRESS NOTES
Pt here for 1 yr check up     EKG done today     Pt continues with chest pain , not noticed for awhile, sob on exertion ,     Swelling in legs and feet,

## 2024-08-08 ENCOUNTER — TELEPHONE (OUTPATIENT)
Dept: CARDIOLOGY CLINIC | Age: 79
End: 2024-08-08

## 2024-09-16 NOTE — PATIENT INSTRUCTIONS
Please come back in one month with lab workup. If there is any concern please call clinic. normal...

## 2025-01-07 ENCOUNTER — HOSPITAL ENCOUNTER (OUTPATIENT)
Age: 80
Discharge: HOME | End: 2025-01-07
Payer: MEDICARE

## 2025-01-07 ENCOUNTER — OFFICE VISIT (OUTPATIENT)
Dept: CARDIOLOGY CLINIC | Age: 80
End: 2025-01-07
Payer: MEDICARE

## 2025-01-07 VITALS
SYSTOLIC BLOOD PRESSURE: 116 MMHG | HEART RATE: 89 BPM | HEIGHT: 64 IN | DIASTOLIC BLOOD PRESSURE: 70 MMHG | WEIGHT: 186 LBS | BODY MASS INDEX: 31.76 KG/M2 | OXYGEN SATURATION: 95 %

## 2025-01-07 DIAGNOSIS — I25.10 CORONARY ARTERY DISEASE INVOLVING NATIVE CORONARY ARTERY OF NATIVE HEART WITHOUT ANGINA PECTORIS: ICD-10-CM

## 2025-01-07 DIAGNOSIS — I50.9 CHF (NYHA CLASS II, ACC/AHA STAGE C) (HCC): Primary | ICD-10-CM

## 2025-01-07 DIAGNOSIS — E78.5 HYPERLIPIDEMIA, UNSPECIFIED HYPERLIPIDEMIA TYPE: ICD-10-CM

## 2025-01-07 DIAGNOSIS — R60.0 LOWER EXTREMITY EDEMA: ICD-10-CM

## 2025-01-07 LAB
CHOLESTEROL, FASTING: 172 MG/DL (ref 100–199)
HDLC SERPL-MCNC: 59 MG/DL
LDLC SERPL CALC-MCNC: 85 MG/DL
TRIGLYCERIDE, FASTING: 138 MG/DL (ref 0–199)

## 2025-01-07 PROCEDURE — 1159F MED LIST DOCD IN RCRD: CPT | Performed by: NURSE PRACTITIONER

## 2025-01-07 PROCEDURE — 1123F ACP DISCUSS/DSCN MKR DOCD: CPT | Performed by: NURSE PRACTITIONER

## 2025-01-07 PROCEDURE — 99214 OFFICE O/P EST MOD 30 MIN: CPT | Performed by: NURSE PRACTITIONER

## 2025-01-07 PROCEDURE — 36415 COLL VENOUS BLD VENIPUNCTURE: CPT

## 2025-01-07 PROCEDURE — 80061 LIPID PANEL: CPT

## 2025-01-07 PROCEDURE — 1160F RVW MEDS BY RX/DR IN RCRD: CPT | Performed by: NURSE PRACTITIONER

## 2025-01-07 PROCEDURE — 3074F SYST BP LT 130 MM HG: CPT | Performed by: NURSE PRACTITIONER

## 2025-01-07 PROCEDURE — 3078F DIAST BP <80 MM HG: CPT | Performed by: NURSE PRACTITIONER

## 2025-01-07 ASSESSMENT — ENCOUNTER SYMPTOMS
ABDOMINAL DISTENTION: 0
COUGH: 0
SHORTNESS OF BREATH: 0

## 2025-01-07 NOTE — PROGRESS NOTES
73%.     SUMMARY:  Successful PCI of the PDA with two overlapping drug-eluting  stents; euvolemic right heart cath pressures with preserved cardiac  output.     PLAN:  1.  Bedrest.  2.  Optimal medical therapy.  3.  Risk factor management.  4.  Routine access site care.  5.  DAPT.  6.  Guideline-directed therapy for CAD.  7.  Guideline-directed therapy for CHF.  8.  Follow up in the office in two to four weeks postprocedure.     All the above were explained with the patient and the patient's family.  They are agreeable and amenable to the plan.        CHRISTOPH PEÑA MD   D: 01/19/2022 11:13:09        CATH:   SUMMARY:  PDA with 70% to 80% stenosis; elevated right heart cath  pressures and elevated EDP suggestive of volume overload with preserved  cardiac output.     RIGHT HEART CATHETERIZATION:  RA 16, RV 41/20, PA 41/23 with a mean of  29, wedge pressure 20, PA saturation is 74%, cardiac output is 4,  cardiac index 2.1.     PLAN:  1.  Bedrest.  2.  Optimal medical therapy.  3.  Risk factor management.  4.  Routine access site care.  5.  Aggressive diuresis, increase Lasix.  Follow up with heart failure  clinic.  Avoid salt and reduce fluid intake.  We will reassess symptoms  at that time.  She has a negative ischemic evaluation.  These findings  are likely more incidental rather than cause of her cardiac  decompensation.  Majority of her shortness of breath appears to be  volume and pressure related.  If she has symptoms despite normalizing  her volume status and her blood pressure, then I would consider  intervention of the PDA.     All the above was explained to the patient and the patient's family.  They were agreeable and amenable to the plan.        CHRISTOPH PEÑA MD     D: 08/16/2020 9:51:16         H[facility]TER  SUMMARY:  No significant abnormalities explained the palpitations, very  occasional PACs and PVCs.     CHRISTOPH PEÑA MD     D: 12/03/2020 5:54:16        Results reviewed:  BNP: No results found for:

## 2025-01-07 NOTE — PATIENT INSTRUCTIONS
You may receive a survey regarding the care you received during your visit.  Your input is valuable to us.  We encourage you to complete and return your survey.  We hope you will choose us in the future for your healthcare needs.    Your nurses today were Brenna Wolf and Juani.  Office hours:   Mon-Thurs 8-4:30  Friday 8-12  Phone: [phone]    Continue:  Continue current medications  Daily weights and record  Fluid restriction of 2 Liters per day  Limit sodium in diet to around 2194-2481 mg/day  Monitor BP    Call the [facility] for any of the following symptoms:   Weight gain of 3 pounds in 1 day or 5 pounds in 1 week  Increased shortness of breath  Shortness of breath while laying down  Chest pain  Swelling in feet, ankles or legs  Bloating in abdomen  Fatigue

## 2025-04-01 ENCOUNTER — OFFICE VISIT (OUTPATIENT)
Dept: INTERNAL MEDICINE CLINIC | Age: 80
End: 2025-04-01
Payer: MEDICARE

## 2025-04-01 ENCOUNTER — HOSPITAL ENCOUNTER (OUTPATIENT)
Age: 80
Discharge: HOME | End: 2025-04-01
Payer: MEDICARE

## 2025-04-01 VITALS
BODY MASS INDEX: 32.54 KG/M2 | OXYGEN SATURATION: 95 % | SYSTOLIC BLOOD PRESSURE: 127 MMHG | WEIGHT: 190.6 LBS | HEART RATE: 79 BPM | DIASTOLIC BLOOD PRESSURE: 68 MMHG | HEIGHT: 64 IN

## 2025-04-01 DIAGNOSIS — N18.31 STAGE 3A CHRONIC KIDNEY DISEASE (HCC): ICD-10-CM

## 2025-04-01 DIAGNOSIS — E78.00 HYPERCHOLESTEROLEMIA: ICD-10-CM

## 2025-04-01 DIAGNOSIS — I50.9 CHF (NYHA CLASS II, ACC/AHA STAGE C) (HCC): ICD-10-CM

## 2025-04-01 DIAGNOSIS — I10 HYPERTENSION, UNSPECIFIED TYPE: Primary | ICD-10-CM

## 2025-04-01 DIAGNOSIS — J45.20 MILD INTERMITTENT ASTHMA WITHOUT COMPLICATION: ICD-10-CM

## 2025-04-01 LAB
ANION GAP SERPL CALC-SCNC: 11 MEQ/L (ref 8–16)
BUN SERPL-MCNC: 18 MG/DL (ref 8–23)
CALCIUM SERPL-MCNC: 10 MG/DL (ref 8.8–10.2)
CHLORIDE SERPL-SCNC: 107 MEQ/L (ref 98–111)
CO2 SERPL-SCNC: 27 MEQ/L (ref 22–29)
CREAT SERPL-MCNC: 1 MG/DL (ref 0.5–0.9)
GFR SERPL CREATININE-BSD FRML MDRD: 58 ML/MIN/1.73M2
GLUCOSE SERPL-MCNC: 117 MG/DL (ref 74–109)
POTASSIUM SERPL-SCNC: 4.5 MEQ/L (ref 3.5–5.2)
SODIUM SERPL-SCNC: 145 MEQ/L (ref 135–145)

## 2025-04-01 PROCEDURE — 36415 COLL VENOUS BLD VENIPUNCTURE: CPT

## 2025-04-01 PROCEDURE — 99213 OFFICE O/P EST LOW 20 MIN: CPT

## 2025-04-01 PROCEDURE — 1123F ACP DISCUSS/DSCN MKR DOCD: CPT

## 2025-04-01 PROCEDURE — 80048 BASIC METABOLIC PNL TOTAL CA: CPT

## 2025-04-01 PROCEDURE — 3074F SYST BP LT 130 MM HG: CPT

## 2025-04-01 PROCEDURE — 3078F DIAST BP <80 MM HG: CPT

## 2025-04-01 PROCEDURE — 1159F MED LIST DOCD IN RCRD: CPT

## 2025-04-01 RX ORDER — ALBUTEROL SULFATE 90 UG/1
2 INHALANT RESPIRATORY (INHALATION) 4 TIMES DAILY PRN
Qty: 18 G | Refills: 3 | Status: SHIPPED | OUTPATIENT
Start: 2025-04-01

## 2025-04-01 SDOH — ECONOMIC STABILITY: FOOD INSECURITY: WITHIN THE PAST 12 MONTHS, THE FOOD YOU BOUGHT JUST DIDN'T LAST AND YOU DIDN'T HAVE MONEY TO GET MORE.: NEVER TRUE

## 2025-04-01 SDOH — ECONOMIC STABILITY: FOOD INSECURITY: WITHIN THE PAST 12 MONTHS, YOU WORRIED THAT YOUR FOOD WOULD RUN OUT BEFORE YOU GOT MONEY TO BUY MORE.: NEVER TRUE

## 2025-04-01 ASSESSMENT — PATIENT HEALTH QUESTIONNAIRE - PHQ9
2. FEELING DOWN, DEPRESSED OR HOPELESS: NOT AT ALL
SUM OF ALL RESPONSES TO PHQ QUESTIONS 1-9: 0
1. LITTLE INTEREST OR PLEASURE IN DOING THINGS: NOT AT ALL

## 2025-04-01 NOTE — PROGRESS NOTES
1945    Chief Complaint   Patient presents with    Congestive Heart Failure    Chronic Kidney Disease    Coronary Artery Disease       76-year-old female with a PMHx of CAD, HTN, CKD stage II with a baseline GFR of 61, HLD, GENNARO not on CPAP, Asthma, arthritis, CHF NYHA class II coming into the clinic for for follow-up visit.     Pt is here for a yearly follow up doing well, has occasional swelling in feet and uses her PRN lasix. Was seen in CHF clinic as well and is doing well.     No headaches, blurred vision, N/V, CP, SOB, abdominal pain.     HTN:   - /68 on this visit  - Patient checks her BP at home once in a while with a BP of ~120/80.   - On home med of Imdur 30 mg + Lopressor 12.5 mg BID and Aldactone 25 mg BID.     HLD:   Lipid panel 1/2025: , , HDL 59, LDL 85.   - Simvastatin 20 mg daily, continue.     CKD stage III:   - BL Cr 1.1-1.2.   - BMP done on 4/2024: BUN/Cr of 27/1.2  w. eGFR of 47.  - repeat BMP Pending today.     Specialists:   CAD s/p PCI x2 RCA:   Last C 01/2022: Successful PCI of the PDA with two overlapping QUINCY.  Previously on Plavix, stopped. Follows with Dr. Valencia.   - On ASA + statin.     Chronic CHF NYHA class III:  TTE 1/2022: EF 50-55%  - Patient is on fluid restrictions of 2 L w/ salt intake of 2g/day.  - GDMT:  Imdur, Aldactone, BB.   - Diuretic: PRN Lasix  - Follows with Dr. Valencia in cardiology + CHF clinic.     Asthma:   Patient follows with pulmonology NP Larry.  - Will order PRN albuterol inhaler as that's helped in the past for wheezing that occurs rarely.      GENNARO: Diagnosed years ago?  At the time could not  tolerate CPAP.     Hx of kidney stones: Most recent kidney stone last year. Advised to drink plenty of fluids.      Arthritis:  - Patient used to get cortisone shots every 3 months in both knees, has stopped.    Past Medical History:   Diagnosis Date    (HFpEF) heart failure with preserved ejection fraction (HCC)     CAD (coronary artery disease)

## 2025-04-02 ENCOUNTER — RESULTS FOLLOW-UP (OUTPATIENT)
Dept: CARDIOLOGY CLINIC | Age: 80
End: 2025-04-02

## 2025-04-27 DIAGNOSIS — E78.00 HYPERCHOLESTEROLEMIA: ICD-10-CM

## 2025-04-28 RX ORDER — ISOSORBIDE MONONITRATE 30 MG/1
TABLET, EXTENDED RELEASE ORAL
Qty: 180 TABLET | Refills: 0 | Status: SHIPPED | OUTPATIENT
Start: 2025-04-28

## 2025-04-28 RX ORDER — POTASSIUM CHLORIDE 750 MG/1
10 TABLET, EXTENDED RELEASE ORAL DAILY
Qty: 90 TABLET | Refills: 3 | Status: SHIPPED | OUTPATIENT
Start: 2025-04-28

## 2025-04-28 RX ORDER — SIMVASTATIN 20 MG
20 TABLET ORAL NIGHTLY
Qty: 90 TABLET | Refills: 0 | OUTPATIENT
Start: 2025-04-28

## 2025-06-18 DIAGNOSIS — I50.9 CHF (NYHA CLASS II, ACC/AHA STAGE C) (HCC): ICD-10-CM

## 2025-06-18 DIAGNOSIS — E78.00 HYPERCHOLESTEROLEMIA: ICD-10-CM

## 2025-06-19 RX ORDER — SPIRONOLACTONE 25 MG/1
25 TABLET ORAL EVERY 12 HOURS
Qty: 180 TABLET | Refills: 3 | Status: SHIPPED | OUTPATIENT
Start: 2025-06-19

## 2025-06-19 RX ORDER — METOPROLOL TARTRATE 25 MG/1
TABLET, FILM COATED ORAL
Qty: 45 TABLET | Refills: 3 | Status: SHIPPED | OUTPATIENT
Start: 2025-06-19

## 2025-06-19 RX ORDER — FUROSEMIDE 40 MG/1
TABLET ORAL
Qty: 135 TABLET | Refills: 3 | Status: SHIPPED | OUTPATIENT
Start: 2025-06-19

## 2025-06-19 RX ORDER — SIMVASTATIN 20 MG
20 TABLET ORAL NIGHTLY
Qty: 90 TABLET | Refills: 3 | Status: SHIPPED | OUTPATIENT
Start: 2025-06-19

## 2025-07-22 RX ORDER — ISOSORBIDE MONONITRATE 30 MG/1
TABLET, EXTENDED RELEASE ORAL
Qty: 180 TABLET | Refills: 3 | Status: SHIPPED | OUTPATIENT
Start: 2025-07-22

## (undated) DEVICE — GUIDEWIRE ENDOSCP L150CM DIA0.035IN TIP 3CM PTFE NIT

## (undated) DEVICE — SINGLE-USE DIGITAL FLEXIBLE URETEROSCOPE: Brand: LITHOVUE

## (undated) DEVICE — URETERAL ACCESS SHEATH SET: Brand: NAVIGATOR HD

## (undated) DEVICE — TUBING, SUCTION, 1/4" X 20', STRAIGHT: Brand: MEDLINE INDUSTRIES, INC.

## (undated) DEVICE — NITINOL STONE RETRIEVAL BASKET: Brand: ZERO TIP

## (undated) DEVICE — YANKAUER,BULB TIP,W/O VENT,RIGID,STERILE: Brand: MEDLINE

## (undated) DEVICE — FIBER LASER HOLM DISP SU200RT] LEONI FIBER OPTICS INC]